# Patient Record
Sex: MALE | URBAN - METROPOLITAN AREA
[De-identification: names, ages, dates, MRNs, and addresses within clinical notes are randomized per-mention and may not be internally consistent; named-entity substitution may affect disease eponyms.]

---

## 2019-06-26 ENCOUNTER — APPOINTMENT (RX ONLY)
Dept: URBAN - METROPOLITAN AREA CLINIC 38 | Facility: CLINIC | Age: 59
Setting detail: DERMATOLOGY
End: 2019-06-26

## 2019-06-26 DIAGNOSIS — L65.9 NONSCARRING HAIR LOSS, UNSPECIFIED: ICD-10-CM

## 2019-06-26 DIAGNOSIS — L82.0 INFLAMED SEBORRHEIC KERATOSIS: ICD-10-CM

## 2019-06-26 PROCEDURE — ? LIQUID NITROGEN

## 2019-06-26 PROCEDURE — ? TREATMENT REGIMEN

## 2019-06-26 PROCEDURE — 99202 OFFICE O/P NEW SF 15 MIN: CPT | Mod: 25

## 2019-06-26 PROCEDURE — ? COUNSELING

## 2019-06-26 PROCEDURE — 17110 DESTRUCTION B9 LES UP TO 14: CPT

## 2019-06-26 ASSESSMENT — LOCATION SIMPLE DESCRIPTION DERM: LOCATION SIMPLE: SCALP

## 2019-06-26 ASSESSMENT — LOCATION DETAILED DESCRIPTION DERM
LOCATION DETAILED: LEFT SUPERIOR PARIETAL SCALP
LOCATION DETAILED: LEFT CENTRAL FRONTAL SCALP

## 2019-06-26 ASSESSMENT — LOCATION ZONE DERM: LOCATION ZONE: SCALP

## 2024-09-22 ENCOUNTER — HOSPITAL ENCOUNTER (INPATIENT)
Facility: MEDICAL CENTER | Age: 64
LOS: 8 days | End: 2024-09-30
Attending: RADIOLOGY | Admitting: INTERNAL MEDICINE
Payer: COMMERCIAL

## 2024-09-22 DIAGNOSIS — I50.20 HEART FAILURE WITH REDUCED EJECTION FRACTION (HCC): ICD-10-CM

## 2024-09-22 DIAGNOSIS — G89.18 ACUTE POST-OPERATIVE PAIN: ICD-10-CM

## 2024-09-22 DIAGNOSIS — Z95.1 S/P CABG (CORONARY ARTERY BYPASS GRAFT): ICD-10-CM

## 2024-09-22 PROBLEM — I21.4 NSTEMI (NON-ST ELEVATED MYOCARDIAL INFARCTION) (HCC): Status: RESOLVED | Noted: 2024-09-22 | Resolved: 2024-09-22

## 2024-09-22 PROBLEM — I21.4 NSTEMI (NON-ST ELEVATED MYOCARDIAL INFARCTION) (HCC): Status: ACTIVE | Noted: 2024-09-22

## 2024-09-22 PROBLEM — Z72.0 TOBACCO ABUSE: Status: ACTIVE | Noted: 2024-09-22

## 2024-09-22 LAB
ALBUMIN SERPL BCP-MCNC: 3.7 G/DL (ref 3.2–4.9)
ALBUMIN/GLOB SERPL: 1.3 G/DL
ALP SERPL-CCNC: 67 U/L (ref 30–99)
ALT SERPL-CCNC: 105 U/L (ref 2–50)
ANION GAP SERPL CALC-SCNC: 12 MMOL/L (ref 7–16)
APTT PPP: 31.6 SEC (ref 24.7–36)
AST SERPL-CCNC: 55 U/L (ref 12–45)
BASOPHILS # BLD AUTO: 0.7 % (ref 0–1.8)
BASOPHILS # BLD: 0.08 K/UL (ref 0–0.12)
BILIRUB SERPL-MCNC: 1 MG/DL (ref 0.1–1.5)
BUN SERPL-MCNC: 18 MG/DL (ref 8–22)
CALCIUM ALBUM COR SERPL-MCNC: 8.9 MG/DL (ref 8.5–10.5)
CALCIUM SERPL-MCNC: 8.7 MG/DL (ref 8.5–10.5)
CHLORIDE SERPL-SCNC: 107 MMOL/L (ref 96–112)
CO2 SERPL-SCNC: 21 MMOL/L (ref 20–33)
CREAT SERPL-MCNC: 1.02 MG/DL (ref 0.5–1.4)
EKG IMPRESSION: NORMAL
EOSINOPHIL # BLD AUTO: 0.24 K/UL (ref 0–0.51)
EOSINOPHIL NFR BLD: 2.2 % (ref 0–6.9)
ERYTHROCYTE [DISTWIDTH] IN BLOOD BY AUTOMATED COUNT: 42.9 FL (ref 35.9–50)
GFR SERPLBLD CREATININE-BSD FMLA CKD-EPI: 83 ML/MIN/1.73 M 2
GLOBULIN SER CALC-MCNC: 2.8 G/DL (ref 1.9–3.5)
GLUCOSE SERPL-MCNC: 169 MG/DL (ref 65–99)
HCT VFR BLD AUTO: 48.7 % (ref 42–52)
HGB BLD-MCNC: 16.5 G/DL (ref 14–18)
IMM GRANULOCYTES # BLD AUTO: 0.04 K/UL (ref 0–0.11)
IMM GRANULOCYTES NFR BLD AUTO: 0.4 % (ref 0–0.9)
INR PPP: 1.02 (ref 0.87–1.13)
LYMPHOCYTES # BLD AUTO: 1.71 K/UL (ref 1–4.8)
LYMPHOCYTES NFR BLD: 15.7 % (ref 22–41)
MCH RBC QN AUTO: 29.7 PG (ref 27–33)
MCHC RBC AUTO-ENTMCNC: 33.9 G/DL (ref 32.3–36.5)
MCV RBC AUTO: 87.6 FL (ref 81.4–97.8)
MONOCYTES # BLD AUTO: 0.64 K/UL (ref 0–0.85)
MONOCYTES NFR BLD AUTO: 5.9 % (ref 0–13.4)
NEUTROPHILS # BLD AUTO: 8.2 K/UL (ref 1.82–7.42)
NEUTROPHILS NFR BLD: 75.1 % (ref 44–72)
NRBC # BLD AUTO: 0 K/UL
NRBC BLD-RTO: 0 /100 WBC (ref 0–0.2)
NT-PROBNP SERPL IA-MCNC: 2950 PG/ML (ref 0–125)
PLATELET # BLD AUTO: 248 K/UL (ref 164–446)
PMV BLD AUTO: 9.9 FL (ref 9–12.9)
POTASSIUM SERPL-SCNC: 4 MMOL/L (ref 3.6–5.5)
PROT SERPL-MCNC: 6.5 G/DL (ref 6–8.2)
PROTHROMBIN TIME: 13.4 SEC (ref 12–14.6)
RBC # BLD AUTO: 5.56 M/UL (ref 4.7–6.1)
SODIUM SERPL-SCNC: 140 MMOL/L (ref 135–145)
UFH PPP CHRO-ACNC: 0.11 IU/ML
UFH PPP CHRO-ACNC: <0.1 IU/ML
WBC # BLD AUTO: 10.9 K/UL (ref 4.8–10.8)

## 2024-09-22 PROCEDURE — 36415 COLL VENOUS BLD VENIPUNCTURE: CPT

## 2024-09-22 PROCEDURE — 700111 HCHG RX REV CODE 636 W/ 250 OVERRIDE (IP): Performed by: STUDENT IN AN ORGANIZED HEALTH CARE EDUCATION/TRAINING PROGRAM

## 2024-09-22 PROCEDURE — 85520 HEPARIN ASSAY: CPT

## 2024-09-22 PROCEDURE — 85025 COMPLETE CBC W/AUTO DIFF WBC: CPT

## 2024-09-22 PROCEDURE — 99407 BEHAV CHNG SMOKING > 10 MIN: CPT | Performed by: STUDENT IN AN ORGANIZED HEALTH CARE EDUCATION/TRAINING PROGRAM

## 2024-09-22 PROCEDURE — A9270 NON-COVERED ITEM OR SERVICE: HCPCS | Performed by: STUDENT IN AN ORGANIZED HEALTH CARE EDUCATION/TRAINING PROGRAM

## 2024-09-22 PROCEDURE — 83880 ASSAY OF NATRIURETIC PEPTIDE: CPT

## 2024-09-22 PROCEDURE — 80053 COMPREHEN METABOLIC PANEL: CPT

## 2024-09-22 PROCEDURE — 700102 HCHG RX REV CODE 250 W/ 637 OVERRIDE(OP): Performed by: STUDENT IN AN ORGANIZED HEALTH CARE EDUCATION/TRAINING PROGRAM

## 2024-09-22 PROCEDURE — 93010 ELECTROCARDIOGRAM REPORT: CPT | Performed by: INTERNAL MEDICINE

## 2024-09-22 PROCEDURE — 770020 HCHG ROOM/CARE - TELE (206)

## 2024-09-22 PROCEDURE — 93005 ELECTROCARDIOGRAM TRACING: CPT | Performed by: STUDENT IN AN ORGANIZED HEALTH CARE EDUCATION/TRAINING PROGRAM

## 2024-09-22 PROCEDURE — 85610 PROTHROMBIN TIME: CPT

## 2024-09-22 PROCEDURE — 99291 CRITICAL CARE FIRST HOUR: CPT | Mod: 25 | Performed by: STUDENT IN AN ORGANIZED HEALTH CARE EDUCATION/TRAINING PROGRAM

## 2024-09-22 PROCEDURE — 85730 THROMBOPLASTIN TIME PARTIAL: CPT

## 2024-09-22 RX ORDER — HEPARIN SODIUM 5000 [USP'U]/100ML
0-30 INJECTION, SOLUTION INTRAVENOUS CONTINUOUS
Status: DISCONTINUED | OUTPATIENT
Start: 2024-09-22 | End: 2024-09-25

## 2024-09-22 RX ORDER — METOPROLOL SUCCINATE 25 MG/1
25 TABLET, EXTENDED RELEASE ORAL
Status: DISCONTINUED | OUTPATIENT
Start: 2024-09-22 | End: 2024-09-24

## 2024-09-22 RX ORDER — NITROGLYCERIN 0.4 MG/1
0.4 TABLET SUBLINGUAL
Status: DISCONTINUED | OUTPATIENT
Start: 2024-09-22 | End: 2024-09-30 | Stop reason: HOSPADM

## 2024-09-22 RX ORDER — HEPARIN SODIUM 1000 [USP'U]/ML
30 INJECTION, SOLUTION INTRAVENOUS; SUBCUTANEOUS PRN
Status: DISCONTINUED | OUTPATIENT
Start: 2024-09-22 | End: 2024-09-22

## 2024-09-22 RX ORDER — ASPIRIN 81 MG/1
81 TABLET ORAL DAILY
Status: DISCONTINUED | OUTPATIENT
Start: 2024-09-23 | End: 2024-09-25

## 2024-09-22 RX ORDER — HEPARIN SODIUM 1000 [USP'U]/ML
30 INJECTION, SOLUTION INTRAVENOUS; SUBCUTANEOUS PRN
Status: DISCONTINUED | OUTPATIENT
Start: 2024-09-22 | End: 2024-09-25

## 2024-09-22 RX ORDER — LOSARTAN POTASSIUM 25 MG/1
25 TABLET ORAL
Status: DISCONTINUED | OUTPATIENT
Start: 2024-09-22 | End: 2024-09-25

## 2024-09-22 RX ORDER — ATORVASTATIN CALCIUM 40 MG/1
40 TABLET, FILM COATED ORAL EVERY EVENING
Status: DISCONTINUED | OUTPATIENT
Start: 2024-09-22 | End: 2024-09-25

## 2024-09-22 RX ORDER — MORPHINE SULFATE 4 MG/ML
4 INJECTION INTRAVENOUS EVERY 4 HOURS PRN
Status: DISCONTINUED | OUTPATIENT
Start: 2024-09-22 | End: 2024-09-25

## 2024-09-22 RX ADMIN — METOPROLOL SUCCINATE 25 MG: 25 TABLET, EXTENDED RELEASE ORAL at 16:55

## 2024-09-22 RX ADMIN — HEPARIN SODIUM 1900 UNITS: 1000 INJECTION, SOLUTION INTRAVENOUS; SUBCUTANEOUS at 22:52

## 2024-09-22 RX ADMIN — LOSARTAN POTASSIUM 25 MG: 25 TABLET, FILM COATED ORAL at 16:55

## 2024-09-22 RX ADMIN — ATORVASTATIN CALCIUM 40 MG: 40 TABLET, FILM COATED ORAL at 16:55

## 2024-09-22 RX ADMIN — HEPARIN SODIUM 12 UNITS/KG/HR: 5000 INJECTION, SOLUTION INTRAVENOUS at 16:12

## 2024-09-22 ASSESSMENT — COGNITIVE AND FUNCTIONAL STATUS - GENERAL
SUGGESTED CMS G CODE MODIFIER DAILY ACTIVITY: CH
SUGGESTED CMS G CODE MODIFIER MOBILITY: CH
DAILY ACTIVITIY SCORE: 24
MOBILITY SCORE: 24

## 2024-09-22 ASSESSMENT — SOCIAL DETERMINANTS OF HEALTH (SDOH)
WITHIN THE LAST YEAR, HAVE TO BEEN RAPED OR FORCED TO HAVE ANY KIND OF SEXUAL ACTIVITY BY YOUR PARTNER OR EX-PARTNER?: NO
WITHIN THE LAST YEAR, HAVE YOU BEEN HUMILIATED OR EMOTIONALLY ABUSED IN OTHER WAYS BY YOUR PARTNER OR EX-PARTNER?: NO
WITHIN THE LAST YEAR, HAVE YOU BEEN KICKED, HIT, SLAPPED, OR OTHERWISE PHYSICALLY HURT BY YOUR PARTNER OR EX-PARTNER?: NO
WITHIN THE LAST YEAR, HAVE YOU BEEN AFRAID OF YOUR PARTNER OR EX-PARTNER?: NO

## 2024-09-22 ASSESSMENT — ENCOUNTER SYMPTOMS
NEUROLOGICAL NEGATIVE: 1
SHORTNESS OF BREATH: 1
GASTROINTESTINAL NEGATIVE: 1
EYES NEGATIVE: 1
MUSCULOSKELETAL NEGATIVE: 1
PSYCHIATRIC NEGATIVE: 1

## 2024-09-22 ASSESSMENT — PAIN DESCRIPTION - PAIN TYPE: TYPE: ACUTE PAIN

## 2024-09-22 ASSESSMENT — PATIENT HEALTH QUESTIONNAIRE - PHQ9
SUM OF ALL RESPONSES TO PHQ9 QUESTIONS 1 AND 2: 0
2. FEELING DOWN, DEPRESSED, IRRITABLE, OR HOPELESS: NOT AT ALL
1. LITTLE INTEREST OR PLEASURE IN DOING THINGS: NOT AT ALL
1. LITTLE INTEREST OR PLEASURE IN DOING THINGS: NOT AT ALL
2. FEELING DOWN, DEPRESSED, IRRITABLE, OR HOPELESS: NOT AT ALL
SUM OF ALL RESPONSES TO PHQ9 QUESTIONS 1 AND 2: 0

## 2024-09-22 NOTE — PROGRESS NOTES
[facility] DIRECT ADMISSION REPORT  Transferring facility: [facility]  Transferring physician: Dr Reyes    Chief complaint: CP and SOB  Pertinent history & patient course:     63-year-old male who lives in [facility] [facility] with long tobacco history who presented to that hospital with chest pain.  He was found on the an elevated troponin of 5900 and was transferred to [facility] for an NSTEMI.  He was placed on a weight-based heparin drip and underwent an urgent left heart catheterization that showed severe multivessel coronary artery disease.  Additionally had a CTA chest given concerning cough that showed bilateral PEs with some peribronchial cuffing.  However though talking to the hospitalist at [facility] he is not concern for active infection therefore antibiotics have been held at this time.  Dr. Sheffield a cardiothoracic surgery was consulted recommended transfer to our hospital for urgent CABG evaluation.  Otherwise he is hemodynamically stable.    Pertinent imaging & lab results: See above  Consultants called prior to transfer and pertinent input from consultants: Cardiothoracic surgery  Code Status: Full code full care per transferring provider, I personally verified with the transferring provider patient's code status and the transferring provider has confirmed this with the patient.  Reason for Transfer: Evaluation for CABG  Further work up or recommendations requested prior to transfer: none    Patient accepted for transfer: Yes  Accepting [facility] Facility: [facility] to notify the Triage Coordinator in the RTOC via Voalte or Phone ext. [phone] when patient arrives to the unit. The Triage Coordinator will assign the admitting provider.    Consultants to be called upon arrival: Cardiothoracic surgery  Admission status: Inpatient.   Floor requested: Telemetry  If ICU transfer, name of intensivist case discussed with and pertinent input from  critical care: Not applicable    The admitting provider is the point of contact for questions or concerns regarding patient's care.

## 2024-09-22 NOTE — PROGRESS NOTES
4 Eyes Skin Assessment Completed by Jace RN and MARICEL Padron.    Head WDL  Ears WDL  Nose WDL  Mouth WDL  Neck WDL  Breast/Chest WDL  Shoulder Blades WDL  Spine WDL  (R) Arm/Elbow/Hand Wrist Cath site  (L) Arm/Elbow/Hand WDL  Abdomen WDL  Groin WDL  Scrotum/Coccyx/Buttocks WDL  (R) Leg WDL  (L) Leg WDL  (R) Heel/Foot/Toe WDL  (L) Heel/Foot/Toe WDL          Devices In Places ECG, Tele Box, Blood Pressure Cuff, and Pulse Ox      Interventions In Place Pillows, Low Air Loss Mattress, Dri-Randal Pads, and Heels Loaded W/Pillows    Possible Skin Injury No    Pictures Uploaded Into Epic N/A  Wound Consult Placed N/A  RN Wound Prevention Protocol Ordered No

## 2024-09-22 NOTE — H&P
Hospital Medicine History & Physical Note    Date of Service  9/22/2024    Primary Care Physician  No primary care provider on file.    Consultants  Cardiothoracic surgery    Code Status  Full Code    Chief Complaint  NSTEMI    History of Presenting Illness  Gold Bustamante is a 63 y.o. male who presented 9/22/2024 with hypertension, diabetes, tobacco use who presented to outside hospital for shortness of breath.  He was initially seen at [facility] where he had workup with elevated D-dimer and troponin.  CTA chest showed no pulmonary embolism, but showed small bilateral pleural effusions and multifocal airspace disease more prominent in the right lower lobe.  Respiratory film assay was negative for adenovirus, COVID, coronavirus, influenza, human metapneumovirus, parainfluenza, RSV, rhinovirus, Bordetella, chlamydia pneumonia, mycoplasma pneumonia.  Patient was then transferred to [facility] for further care.     At [facility], found to have elevated troponin 5900 and . TTE showing mild concentric left ventricular hypertrophy.  Severe global hypokinesis with an EF of 25%.  No thrombus seen.  Grade 3 diastolic dysfunction with markedly elevated left ventricular filling pressures.  Lipid panel: Total cholesterol 205 triglycerides 144 HDL 35   A1c 5.6    Patient was started on heparin drip.  Patient underwent cardiac catheterization with findings of multivessel coronary artery disease.  Patient was then transferred to [facility] on heparin drip for cardiothoracic surgery consultation.    I discussed the plan of care with patient.    Review of Systems  Review of Systems   Constitutional:  Positive for malaise/fatigue.   HENT: Negative.     Eyes: Negative.    Respiratory:  Positive for shortness of breath.    Cardiovascular:  Positive for chest pain.   Gastrointestinal: Negative.    Genitourinary: Negative.    Musculoskeletal: Negative.    Skin: Negative.    Neurological: Negative.   "  Endo/Heme/Allergies: Negative.    Psychiatric/Behavioral: Negative.         Past Medical History   has no past medical history on file.    Surgical History   has no past surgical history on file.     Family History  family history is not on file.   Family history reviewed with patient. There is no family history that is pertinent to the chief complaint.     Social History       Allergies  No Known Allergies    Medications  None       Physical Exam  Pulse:  [76] 76  Resp:  [16] 16  BP: (146)/(97) 146/97  SpO2:  [92 %] 92 %  Blood Pressure: (!) 146/97       Pulse: 76   Respiration: 16   Pulse Oximetry: 92 %       Physical Exam  Constitutional:       Appearance: Normal appearance. He is normal weight.   HENT:      Head: Normocephalic.      Nose: Nose normal.      Mouth/Throat:      Mouth: Mucous membranes are moist.   Eyes:      Pupils: Pupils are equal, round, and reactive to light.   Cardiovascular:      Rate and Rhythm: Normal rate and regular rhythm.      Pulses: Normal pulses.   Pulmonary:      Effort: Pulmonary effort is normal.      Breath sounds: Normal breath sounds.   Abdominal:      General: Abdomen is flat. Bowel sounds are normal.      Palpations: Abdomen is soft.   Musculoskeletal:         General: Normal range of motion.      Cervical back: Neck supple.   Skin:     General: Skin is warm.   Neurological:      General: No focal deficit present.      Mental Status: He is alert and oriented to person, place, and time. Mental status is at baseline.   Psychiatric:         Mood and Affect: Mood normal.         Behavior: Behavior normal.         Thought Content: Thought content normal.         Judgment: Judgment normal.         Laboratory:          No results for input(s): \"ALTSGPT\", \"ASTSGOT\", \"ALKPHOSPHAT\", \"TBILIRUBIN\", \"DBILIRUBIN\", \"GAMMAGT\", \"AMYLASE\", \"LIPASE\", \"ALB\", \"PREALBUMIN\", \"GLUCOSE\" in the last 72 hours.      No results for input(s): \"NTPROBNP\" in the last 72 hours.      No results for " "input(s): \"TROPONINT\" in the last 72 hours.    Imaging:  No orders to display       EKG:  I have personally reviewed the images and compared with prior images.    Assessment/Plan:  Justification for Admission Status  I anticipate this patient will require at least two midnights for appropriate medical management, necessitating inpatient admission because patient has NSTEMI    Patient will need a Telemetry bed on MEDICAL service .  The need is secondary to NSTEMI.    * NSTEMI (non-ST elevated myocardial infarction) (HCC)- (present on admission)  Assessment & Plan  Cardiac catheterization at outside hospital showing findings of multivessel coronary artery disease  Started on heparin drip, continue heparin drip  I spoke with cardiothoracic surgery, will see in am  Started on aspirin and Lipitor at outside hospital, continue  A1c 5.6 and  at outside hospital    Tobacco abuse  Assessment & Plan  11 minutes spent on tobacco cessation.  Patient has been smoking half a pack of cigarettes for several decades.  Unsure of whether or not he has COPD.  He was counseled on benefits of tobacco cessation and its likely contribution to his current multivessel coronary artery disease.  He states that he will quit smoking.  He was offered nicotine replacement therapy, to which at this time he declines.    Heart failure with reduced ejection fraction (HCC)  Assessment & Plan  Found to have severe hypokinesis globally with an EF of 25% without any thrombus seen  Start losartan and metoprolol      Patient is critically ill.   The patient continues to have: NSTEMI with multivessel coronary artery disease  The vital organ system that is affected is the: Heart, cardiovascular system  If untreated there is a high chance of deterioration into: STEMI, arrhythmia  And eventually death.   The critical care that I am providing today is: Heparin drip  The critical that has been undertaken is medically complex.   There has been no overlap in " critical care time.   Critical Care Time not including procedures: 35      VTE prophylaxis: therapeutic anticoagulation with heparin drip

## 2024-09-22 NOTE — ASSESSMENT & PLAN NOTE
Found to have severe hypokinesis globally with an EF of 25% without any thrombus seen  Clinically appears euvolemic  GDMT as tolerated post op  If renal function remains stable

## 2024-09-22 NOTE — ASSESSMENT & PLAN NOTE
Cardiac catheterization at outside hospital showing findings of multivessel coronary artery disease  Cont on heparin drip   Cardiothoracic surgery following and planing for surgery on 9/25/2024  A1c 5.6 and  at outside hospital  Continue aspirin atorvastatin

## 2024-09-22 NOTE — CONSULTS
REFERRING PHYSICIAN: Jose Montague MD.     CONSULTING PHYSICIAN: Pierre Sheffield DO     CHIEF COMPLAINT: NSTEMI    HISTORY OF PRESENT ILLNESS: The patient is a 63 y.o. male with a past medical history significant for hypertension, diabetes, tobacco use who presented to outside hospital in [place] with chest pain, was found to have elevated troponin of 5900 and was transferred to [facility] for NSTEMI. He was started on medical management and underwent urgent left heart catheterization showing multivessel coronary artery disease. He also had CT chest demonstrating bilateral pulmonary effusions, mediastinal lymphadenopathy, multifocal airspace disease, severe peribronchial thickening, and atheromatous calcification of thoracic aorta. He was then transferred to [facility] for a higher level of care and cardiac surgery consultation.     The patient was seen and evaluated at bedside and endorses the above history. He currently reports no new chest pains. He does not have a strong support system here in [place]. He lives with a room mate, but his closet family is in [facility]. He is also insistent to go home to care of personal matters prior to surgery, despite my telling him he needs surgery.      PAST MEDICAL HISTORY:   Active Ambulatory Problems     Diagnosis Date Noted    No Active Ambulatory Problems     Resolved Ambulatory Problems     Diagnosis Date Noted    No Resolved Ambulatory Problems     No Additional Past Medical History       PAST SURGICAL HISTORY: No past surgical history on file.     ALLERGIES: No Known Allergies     CURRENT MEDICATIONS:   Current Facility-Administered Medications:     heparin infusion 25,000 units in 500 mL 0.45% NACL, 0-30 Units/kg/hr, Intravenous, Continuous, Jose Montague M.D., Last Rate: 15.1 mL/hr at 09/22/24 1612, 12 Units/kg/hr at 09/22/24 1612    [START ON 9/23/2024] aspirin EC tablet 81 mg, 81 mg, Oral, DAILY, Jose Montague M.D.     atorvastatin (Lipitor) tablet 40 mg, 40 mg, Oral, Q EVENING, Jose Montague M.D.    heparin injection 1,900 Units, 30 Units/kg, Intravenous, PRN, Jose Montague M.D.    losartan (Cozaar) tablet 25 mg, 25 mg, Oral, Q DAY, Jose Montague M.D.    metoprolol SR (Toprol XL) tablet 25 mg, 25 mg, Oral, Q DAY, Jose Montague M.D.    morphine 4 MG/ML injection 4 mg, 4 mg, Intravenous, Q4HRS PRN, Jose Montague M.D.    nitroglycerin (Nitrostat) tablet 0.4 mg, 0.4 mg, Sublingual, Q5 MIN PRN, Jose Montague M.D.    FAMILY HISTORY: No family history on file.     SOCIAL HISTORY:   Social History     Socioeconomic History    Marital status: Single     Spouse name: Not on file    Number of children: Not on file    Years of education: Not on file    Highest education level: Not on file   Occupational History    Not on file   Tobacco Use    Smoking status: Not on file    Smokeless tobacco: Not on file   Substance and Sexual Activity    Alcohol use: Not on file    Drug use: Not on file    Sexual activity: Not on file   Other Topics Concern    Not on file   Social History Narrative    Not on file     Social Determinants of Health     Financial Resource Strain: Low Risk  (9/21/2024)    Received from [facility]    Overall Financial Resource Strain (CARDIA)     Difficulty of Paying Living Expenses: Not hard at all   Food Insecurity: No Food Insecurity (9/21/2024)    Received from [facility]    Hunger Vital Sign     Worried About Running Out of Food in the Last Year: Never true     Ran Out of Food in the Last Year: Never true   Transportation Needs: No Transportation Needs (9/21/2024)    Received from [facility]    PRAPARE - Transportation     Lack of Transportation (Medical): No     Lack of Transportation (Non-Medical): No   Physical Activity: Sufficiently Active (9/21/2024)    Received from [facility]    Exercise Vital Sign     Days of Exercise per Week: 5 days     Minutes of Exercise per  Session: 60 min   Stress: No Stress Concern Present (9/21/2024)    Received from [facility]    Citizen of [place] of Occupational Health - Occupational Stress Questionnaire     Feeling of Stress : Not at all   Social Connections: Socially Isolated (9/21/2024)    Received from [facility]    Social Connection and Isolation Panel [NHANES]     Frequency of Communication with Friends and Family: Once a week     Frequency of Social Gatherings with Friends and Family: Once a week     Attends Tenriism Services: Never     Active Member of Clubs or Organizations: No     Attends Club or Organization Meetings: Never     Marital Status: Never    Intimate Partner Violence: Not At Risk (9/22/2024)    Humiliation, Afraid, Rape, and Kick questionnaire     Fear of Current or Ex-Partner: No     Emotionally Abused: No     Physically Abused: No     Sexually Abused: No   Housing Stability: Low Risk  (9/21/2024)    Received from [facility]    Housing Stability Vital Sign     Unable to Pay for Housing in the Last Year: No     Number of Times Moved in the Last Year: 1     Homeless in the Last Year: No       REVIEW OF SYSTEMS:  Review of Systems   Constitutional:  Negative for chills, diaphoresis, fever and weight loss.   HENT:  Negative for congestion, ear pain, hearing loss, nosebleeds, sore throat and tinnitus.    Eyes:  Negative for blurred vision, double vision, pain and discharge.   Respiratory:  Negative for cough, hemoptysis, sputum production, wheezing and stridor.    Cardiovascular:  Positive for chest pain. Negative for palpitations, orthopnea and leg swelling.   Gastrointestinal:  Negative for abdominal pain, constipation, heartburn, melena, nausea and vomiting.   Genitourinary:  Negative for dysuria, flank pain and hematuria.   Musculoskeletal:  Positive for joint pain. Negative for myalgias and neck pain.   Skin:  Negative for itching and rash.   Neurological:  Negative for dizziness, speech change,  "focal weakness, seizures, weakness and headaches.   Endo/Heme/Allergies:  Negative for environmental allergies and polydipsia. Does not bruise/bleed easily.   Psychiatric/Behavioral:  Negative for depression, hallucinations, substance abuse and suicidal ideas.          PHYSICAL EXAMINATION:    BP (!) 146/97   Pulse 76   Resp 16   Ht 1.651 m (5' 5\")   Wt 63.1 kg (139 lb 1.8 oz)   SpO2 92%   BMI 23.15 kg/m²    Physical Exam  Constitutional:       General: He is not in acute distress.  HENT:      Head: Normocephalic and atraumatic.      Nose: Nose normal.   Eyes:      Conjunctiva/sclera: Conjunctivae normal.      Pupils: Pupils are equal, round, and reactive to light.   Neck:      Vascular: No JVD.      Trachea: No tracheal deviation.   Cardiovascular:      Rate and Rhythm: Normal rate and regular rhythm.   Pulmonary:      Effort: Pulmonary effort is normal. No respiratory distress.      Breath sounds: Normal breath sounds. No stridor.   Abdominal:      General: Bowel sounds are normal. There is no distension.      Palpations: Abdomen is soft.      Tenderness: There is no abdominal tenderness.   Musculoskeletal:         General: No tenderness. Normal range of motion.      Cervical back: Normal range of motion and neck supple.   Skin:     General: Skin is warm and dry.   Neurological:      Mental Status: He is alert and oriented to person, place, and time.      Coordination: Coordination normal.      Gait: Gait is intact.   Psychiatric:         Mood and Affect: Mood and affect normal.         Cognition and Memory: Memory normal.             LABS REVIEWED:  Lab Results   Component Value Date/Time    SODIUM 140 09/22/2024 04:06 PM    POTASSIUM 4.0 09/22/2024 04:06 PM    CHLORIDE 107 09/22/2024 04:06 PM    CO2 21 09/22/2024 04:06 PM    GLUCOSE 169 (H) 09/22/2024 04:06 PM    BUN 18 09/22/2024 04:06 PM    CREATININE 1.02 09/22/2024 04:06 PM    BUNCREATRAT 18.2 09/22/2024 03:25 AM      Lab Results   Component Value " Date/Time    PROTHROMBTM 13.4 09/22/2024 04:06 PM    INR 1.02 09/22/2024 04:06 PM      Lab Results   Component Value Date/Time    WBC 10.9 (H) 09/22/2024 04:06 PM    RBC 5.56 09/22/2024 04:06 PM    HEMOGLOBIN 16.5 09/22/2024 04:06 PM    HEMATOCRIT 48.7 09/22/2024 04:06 PM    MCV 87.6 09/22/2024 04:06 PM    MCH 29.7 09/22/2024 04:06 PM    MCHC 33.9 09/22/2024 04:06 PM    MPV 9.9 09/22/2024 04:06 PM    NEUTSPOLYS 75.10 (H) 09/22/2024 04:06 PM    LYMPHOCYTES 15.70 (L) 09/22/2024 04:06 PM    MONOCYTES 5.90 09/22/2024 04:06 PM    EOSINOPHILS 2.20 09/22/2024 04:06 PM    BASOPHILS 0.70 09/22/2024 04:06 PM        IMAGING REVIEWED AND INTERPRETED:    ECHOCARDIOGRAM   9/22/24 [facility]- TTE  Conclusion   1. Mild concentric LVH. Severe global hypokinesis with an LVEF of 25%. There is no ventricular septal defect visualized. No left ventricle thrombus noted on this study. There is grade 3 (restrictive) diastolic dysfunction with markedly elevated LV filling pressures.   2. Left atrium is dilated at 46 ml/m2.   3. The Aortic valve is mildly sclerotic without stenosis. AV appears trileaflet. Mild to moderate aortic regurgitation. PHT is 373 ms.   4. Mild mitral regurgitation.   5. Aortic root is dilated at 3.9 cm.   6. The right ventricle is normal in size and function. Estimated RVSP = 24 mmHg (RAP = 15 mmHg).     CARDIAC CATHETERIZATION   9/22/24 [facility] LHC  LM is calcified and has diffuse long 80% stenosis.  LAD is calcified and has 80% stenosis in proximal-mid segment. Mid-distal LAD looks patent and looks a good target for LIMA. It gives 3 diagonal branches, which look patent.  Ramus looks patent without significant disease.  LCX has 70-80% focal stenosis in mid segment. It gives 2 OM branches, which look patent  RCA is  in mid segment. There are right to right and left to right collaterals filling the distal RCA. LVEDP was measured at 11 mmHg     CT SCAN CHEST   9/21/24 [facility]   Impression  1. No  evidence of pulmonary embolism.  2. Small bilateral pleural effusions.  3. Multifocal airspace disease, predominantly affecting the right lower lobe concerning for  inflammatory/infectious atypical pneumonitis.  4. Severe peribronchial thickening suggesting inflammatory/infectious bronchitis/bronchiolitis.  5. Mediastinal lymphadenopathy.  6. Mild atheromatous calcification of the thoracic aorta.  7. Other incidental findings as noted above.        IMPRESSION:  63 y.o. male with a past medical history significant for hypertension, diabetes, tobacco use, now with severe symptomatic multivessel coronary artery disease      PLAN:  I recommend CABG x3-4 with EVH. We do need to acquire the images from the OLF prior to the procedure. I discussed with patient the importance of the need for surgery, and the potential for harm and delay in care, should he go home to take care of his personal matters. I will schedule him for surgery, anticipating he will stay for said surgery.     The procedure, its risks, benefits, potential complications and alternative treatments were discussed with the patient in detail including the risks should he decide not to undergo my recommended treatment. All of his questions were answered to his satisfaction and he is willing to proceed with the operation. The risks include death, stroke, infection: to include a rare bacterial infection related to the use of the heart/lung machine, mary anne-operative myocardial infarction, dysrhythmias, diaphragmatic paralysis, chest wall paresthesia, tracheostomy, kidney or other organ failure, possible return to the operating room for bleeding, bleeding requiring transfusion with its attendant risks including AIDS or hepatitis, dehiscence of surgical incisions, respiratory complications including the need for prolonged ventilator support, Protamine or other drug reaction, peripheral neuropathy, loss of limb, and miscount of surgical items. The operative mortality  risk is approximately 4%. The STS mortality risk score for CABG is 3% and the morbidity and mortality risk score for CABG is 15%. The scores were discussed with patient.    Thank you for this very challenging consultation and participation in the patient’s care.  I will keep you apprised of all future developments.      The operation is scheduled for surgery this hospitalization       Sincerely,     Pierre Sheffield DO        I,  Pierre Sheffield DO performed a substantial portion of the service face-to-face with the same patient on the same date of service. I have reviewed and agree with the care plan and complexity of problems documented by me, Pierre Sheffield DO and/or Kranthi Rey PA-C. I was personally involved in the medical decision making, including the information below:  reviewing and interpreting the films, conducted elements of the history and physical exam, and provided the plan of care. All medical decision making was made by me.

## 2024-09-23 ENCOUNTER — HOSPITAL ENCOUNTER (OUTPATIENT)
Dept: RADIOLOGY | Facility: MEDICAL CENTER | Age: 64
End: 2024-09-23
Attending: HOSPITALIST

## 2024-09-23 ENCOUNTER — APPOINTMENT (OUTPATIENT)
Dept: RADIOLOGY | Facility: MEDICAL CENTER | Age: 64
End: 2024-09-23
Attending: NURSE PRACTITIONER
Payer: COMMERCIAL

## 2024-09-23 ENCOUNTER — HOSPITAL ENCOUNTER (OUTPATIENT)
Dept: RADIOLOGY | Facility: MEDICAL CENTER | Age: 64
End: 2024-09-23

## 2024-09-23 ENCOUNTER — APPOINTMENT (OUTPATIENT)
Dept: RADIOLOGY | Facility: MEDICAL CENTER | Age: 64
DRG: 235 | End: 2024-09-23
Attending: NURSE PRACTITIONER
Payer: COMMERCIAL

## 2024-09-23 LAB
UFH PPP CHRO-ACNC: 0.44 IU/ML
UFH PPP CHRO-ACNC: 0.57 IU/ML

## 2024-09-23 PROCEDURE — 700111 HCHG RX REV CODE 636 W/ 250 OVERRIDE (IP): Mod: JZ | Performed by: STUDENT IN AN ORGANIZED HEALTH CARE EDUCATION/TRAINING PROGRAM

## 2024-09-23 PROCEDURE — 93970 EXTREMITY STUDY: CPT

## 2024-09-23 PROCEDURE — 85520 HEPARIN ASSAY: CPT

## 2024-09-23 PROCEDURE — 93880 EXTRACRANIAL BILAT STUDY: CPT

## 2024-09-23 PROCEDURE — A9270 NON-COVERED ITEM OR SERVICE: HCPCS | Performed by: STUDENT IN AN ORGANIZED HEALTH CARE EDUCATION/TRAINING PROGRAM

## 2024-09-23 PROCEDURE — 770020 HCHG ROOM/CARE - TELE (206)

## 2024-09-23 PROCEDURE — 36415 COLL VENOUS BLD VENIPUNCTURE: CPT

## 2024-09-23 PROCEDURE — 93970 EXTREMITY STUDY: CPT | Mod: 26 | Performed by: INTERNAL MEDICINE

## 2024-09-23 PROCEDURE — 99233 SBSQ HOSP IP/OBS HIGH 50: CPT | Performed by: INTERNAL MEDICINE

## 2024-09-23 PROCEDURE — 99223 1ST HOSP IP/OBS HIGH 75: CPT | Mod: 57,FS | Performed by: THORACIC SURGERY (CARDIOTHORACIC VASCULAR SURGERY)

## 2024-09-23 PROCEDURE — 700102 HCHG RX REV CODE 250 W/ 637 OVERRIDE(OP): Performed by: STUDENT IN AN ORGANIZED HEALTH CARE EDUCATION/TRAINING PROGRAM

## 2024-09-23 PROCEDURE — 93880 EXTRACRANIAL BILAT STUDY: CPT | Mod: 26 | Performed by: INTERNAL MEDICINE

## 2024-09-23 RX ADMIN — ASPIRIN 81 MG: 81 TABLET, COATED ORAL at 05:01

## 2024-09-23 RX ADMIN — LOSARTAN POTASSIUM 25 MG: 25 TABLET, FILM COATED ORAL at 05:01

## 2024-09-23 RX ADMIN — METOPROLOL SUCCINATE 25 MG: 25 TABLET, EXTENDED RELEASE ORAL at 05:01

## 2024-09-23 RX ADMIN — HEPARIN SODIUM 16 UNITS/KG/HR: 5000 INJECTION, SOLUTION INTRAVENOUS at 19:47

## 2024-09-23 RX ADMIN — ATORVASTATIN CALCIUM 40 MG: 40 TABLET, FILM COATED ORAL at 16:49

## 2024-09-23 ASSESSMENT — ENCOUNTER SYMPTOMS
BLURRED VISION: 0
HEMOPTYSIS: 0
HALLUCINATIONS: 0
PSYCHIATRIC NEGATIVE: 1
WHEEZING: 0
MYALGIAS: 0
FLANK PAIN: 0
CHILLS: 0
SPUTUM PRODUCTION: 0
HEADACHES: 0
BRUISES/BLEEDS EASILY: 0
SHORTNESS OF BREATH: 1
DOUBLE VISION: 0
DEPRESSION: 0
SORE THROAT: 0
PALPITATIONS: 0
HEARTBURN: 0
SEIZURES: 0
NAUSEA: 0
WEAKNESS: 0
EYES NEGATIVE: 1
FOCAL WEAKNESS: 0
EYE DISCHARGE: 0
ABDOMINAL PAIN: 0
EYE PAIN: 0
COUGH: 0
ORTHOPNEA: 0
NECK PAIN: 0
DIZZINESS: 0
SPEECH CHANGE: 0
STRIDOR: 0
POLYDIPSIA: 0
DIAPHORESIS: 0
MUSCULOSKELETAL NEGATIVE: 1
VOMITING: 0
GASTROINTESTINAL NEGATIVE: 1
CONSTIPATION: 0
FEVER: 0
NEUROLOGICAL NEGATIVE: 1
WEIGHT LOSS: 0

## 2024-09-23 ASSESSMENT — PAIN DESCRIPTION - PAIN TYPE: TYPE: ACUTE PAIN

## 2024-09-23 ASSESSMENT — LIFESTYLE VARIABLES: SUBSTANCE_ABUSE: 0

## 2024-09-23 NOTE — CARE PLAN
The patient is Watcher - Medium risk of patient condition declining or worsening    Shift Goals  Clinical Goals: coping strategies, comfort, VSS  Patient Goals: Rest, comfort  Family Goals: tenzin    Progress made toward(s) clinical / shift goals:    Problem: Respiratory:  Goal: Respiratory status will improve  Outcome: Not Progressing  Note: Pt WOB is progressing. Pt has pleural effusions with crackles on assessment.      Problem: Knowledge Deficit - Standard  Goal: Patient and family/care givers will demonstrate understanding of plan of care, disease process/condition, diagnostic tests and medications  Outcome: Progressing     Problem: Communication  Goal: The ability to communicate needs accurately and effectively will improve  Outcome: Progressing     Problem: Safety  Goal: Will remain free from injury  Outcome: Progressing  Goal: Will remain free from falls  Outcome: Progressing     Problem: Pain Management  Goal: Pain level will decrease to patient's comfort goal  Outcome: Progressing     Problem: Psychosocial Needs:  Goal: Level of anxiety will decrease  Outcome: Progressing  Note: Discussed plan of care, and what to expect come morning. Pt seems anxious about open heart surgery and has several questions for the doctors in the AM.      Problem: Urinary Elimination:  Goal: Ability to reestablish a normal urinary elimination pattern will improve  Outcome: Progressing     Problem: Skin Integrity  Goal: Risk for impaired skin integrity will decrease  Outcome: Progressing     Problem: Knowledge Deficit  Goal: Knowledge of disease process/condition, treatment plan, diagnostic tests, and medications will improve  Outcome: Progressing  Goal: Knowledge of the prescribed therapeutic regimen will improve  Outcome: Progressing     Problem: Optimal Care for the Acute Coronary Syndrome Patient  Goal: Optimal Care for the Acute Coronary Syndrome Patient  Outcome: Progressing  Goal: Potential Interventions for the Acute  Coronary Syndrome Patient  Outcome: Progressing  Note: PT has cardiothoracic consulted for possible CABG workup     Problem: Respiratory  Goal: Patient will achieve/maintain optimum respiratory ventilation and gas exchange  Outcome: Progressing     Problem: Hemodynamics  Goal: Patient's hemodynamics, fluid balance and neurologic status will be stable or improve  Outcome: Progressing     Problem: Pain - Standard  Goal: Alleviation of pain or a reduction in pain to the patient’s comfort goal  Outcome: Progressing       Patient is not progressing towards the following goals:      Problem: Respiratory:  Goal: Respiratory status will improve  Outcome: Not Progressing  Note: Pt WOB is progressing. Pt has pleural effusions with crackles on assessment.

## 2024-09-23 NOTE — DISCHARGE PLANNING
Case Management Discharge Planning    Admission Date: 9/22/2024  GMLOS: 2.4  ALOS: 1    TRANSFER BACK PATIENT    Referring Facility: [place]   Reason for Transfer: Cardiothoracic Surgery     Signed Repatriation/Transfer Back Agreement saved in .    Once this patient has received the requested service or the patient no longer requires tertiary level of care from [facility] and is stable to transfer back to referring facility, we can facilitate a transfer back. Please contact the [facility] at [phone] to coordinate this transfer request.

## 2024-09-23 NOTE — PROGRESS NOTES
Monitor Summary   Rhythm: SB-NSR  Rate: 59-84  Ectopy: PAC, PVC trigem,  0.21/0.10/0.38         Routine  care and anticipatory guidance

## 2024-09-23 NOTE — PROGRESS NOTES
Hospital Medicine Daily Progress Note    Date of Service  9/23/2024    Chief Complaint  Glod Bustamante is a 63 y.o. male admitted 9/22/2024 with NSTEMI    Hospital Course  This is a 63 y.o. male who presented 9/22/2024 with hypertension, diabetes, tobacco use who presented to outside hospital for shortness of breath.  He was initially seen at [facility] where he had workup with elevated D-dimer and troponin.  CTA chest showed no pulmonary embolism, but showed small bilateral pleural effusions and multifocal airspace disease more prominent in the right lower lobe.  Respiratory film assay was negative for adenovirus, COVID, coronavirus, influenza, human metapneumovirus, parainfluenza, RSV, rhinovirus, Bordetella, chlamydia pneumonia, mycoplasma pneumonia.  Patient was then transferred to [facility] for further care.      At [facility], found to have elevated troponin 5900 and . TTE showing mild concentric left ventricular hypertrophy.  Severe global hypokinesis with an EF of 25%.  No thrombus seen.  Grade 3 diastolic dysfunction with markedly elevated left ventricular filling pressures.  Lipid panel: Total cholesterol 205 triglycerides 144 HDL 35   A1c 5.6  Patient was started on heparin drip.  Patient underwent cardiac catheterization with findings of multivessel coronary artery disease.  Patient was then transferred to [facility] on heparin drip for cardiothoracic surgery consultation.    Interval Problem Update  Patient seen and examined, afebrile resting in bed, n nauea or vomiting. Cardiothoracic surgery following an planing for CABG on 9/25/24  Cont on heparin drip close monitor on tele for decompensation     I have discussed this patient's plan of care and discharge plan at IDT rounds today with Case Management, Nursing, Nursing leadership, and other members of the IDT team.    Consultants/Specialty  cardiovascular surgeon    Code Status  Full Code    Disposition  The patient is not  medically cleared for discharge to home or a post-acute facility.      I have placed the appropriate orders for post-discharge needs.    Review of Systems  Review of Systems   Constitutional:  Positive for malaise/fatigue.   HENT: Negative.     Eyes: Negative.    Respiratory:  Positive for shortness of breath.    Cardiovascular:  Positive for chest pain.   Gastrointestinal: Negative.    Genitourinary: Negative.    Musculoskeletal: Negative.    Skin: Negative.    Neurological: Negative.    Endo/Heme/Allergies: Negative.    Psychiatric/Behavioral: Negative.          Physical Exam  Temp:  [36.5 °C (97.7 °F)-36.7 °C (98.1 °F)] 36.6 °C (97.9 °F)  Pulse:  [59-76] 59  Resp:  [16-17] 16  BP: (127-148)/() 135/72  SpO2:  [92 %-97 %] 97 %    Physical Exam  Constitutional:       Appearance: Normal appearance. He is normal weight.   HENT:      Head: Normocephalic.      Nose: Nose normal.      Mouth/Throat:      Mouth: Mucous membranes are moist.   Eyes:      Pupils: Pupils are equal, round, and reactive to light.   Cardiovascular:      Rate and Rhythm: Normal rate and regular rhythm.      Pulses: Normal pulses.   Pulmonary:      Effort: Pulmonary effort is normal.      Breath sounds: Normal breath sounds.   Abdominal:      General: Abdomen is flat. Bowel sounds are normal.      Palpations: Abdomen is soft.   Musculoskeletal:         General: Normal range of motion.      Cervical back: Neck supple.   Skin:     General: Skin is warm.   Neurological:      General: No focal deficit present.      Mental Status: He is alert and oriented to person, place, and time. Mental status is at baseline.   Psychiatric:         Mood and Affect: Mood normal.         Behavior: Behavior normal.         Thought Content: Thought content normal.         Judgment: Judgment normal.         Fluids    Intake/Output Summary (Last 24 hours) at 9/23/2024 1315  Last data filed at 9/23/2024 0540  Gross per 24 hour   Intake 524.85 ml   Output --   Net  524.85 ml        Laboratory  Recent Labs     09/21/24  1650 09/22/24  0325 09/22/24  1606   WBC 9.00 10.00 10.9*   RBC 5.02 5.46 5.56   HEMOGLOBIN 15.0 16.2 16.5   HEMATOCRIT 45.2 48.9 48.7   MCV 89.9 89.7 87.6   MCH 29.8 29.7 29.7   MCHC 33.1* 33.1* 33.9   RDW 13.7 13.9 42.9   PLATELETCT 239 251 248   MPV 8.3 8.7 9.9     Recent Labs     09/21/24  1608 09/22/24  0325 09/22/24  1606   SODIUM 139 140 140   POTASSIUM 4.1 4.3 4.0   CHLORIDE 109 113* 107   CO2 25.0 20.0 21   GLUCOSE 116 119 169*   BUN 16.0 20.0 18   CREATININE 0.90 1.10 1.02   CALCIUM 8.8 9.2 8.7     Recent Labs     09/22/24  1606   APTT 31.6   INR 1.02               Imaging  US-CAROTID DOPPLER BILAT   Final Result      US-VEIN MAPPING LOWER EXTREMITY BILAT   Final Result           Assessment/Plan  * NSTEMI (non-ST elevated myocardial infarction) (HCC)- (present on admission)  Assessment & Plan  Cardiac catheterization at outside hospital showing findings of multivessel coronary artery disease  Cont on heparin drip   Cardiothoracic surgery following and planing for surgery on W  Started on aspirin and Lipitor at outside hospital, continue  A1c 5.6 and  at outside hospital    Tobacco abuse  Assessment & Plan  11 minutes spent on tobacco cessation.  Patient has been smoking half a pack of cigarettes for several decades.  Unsure of whether or not he has COPD.  He was counseled on benefits of tobacco cessation and its likely contribution to his current multivessel coronary artery disease.  He states that he will quit smoking.  He was offered nicotine replacement therapy, to which at this time he declines.    Heart failure with reduced ejection fraction (HCC)  Assessment & Plan  Found to have severe hypokinesis globally with an EF of 25% without any thrombus seen  Start losartan and metoprolol         VTE prophylaxis: Heparin drip    Greater than 51 minutes spent prepping to see patient (e.g. review of tests) obtaining and/or reviewing separately  obtained history. Performing a medically appropriate examination and/ evaluation.  Counseling and educating the patient/family/caregiver.  Ordering medications, tests, or procedures.  Referring and communicating with other health care professionals.  Documenting clinical information in EPIC.  Independently interpreting results and communicating results to patient/family/caregiver.  Care coordination.      I have performed a physical exam and reviewed and updated ROS and Plan today (9/23/2024). In review of yesterday's note (9/22/2024), there are no changes except as documented above.

## 2024-09-23 NOTE — ED NOTES
Med Rec complete per patient   Allergies reviewed  Antibiotics in the past 30 days:no  Anticoagulant in past 14 days:no  Pharmacy patient utilizes:[facility] in [place]    Pt states he does not take any RX medications

## 2024-09-23 NOTE — PROGRESS NOTES
Report received from Day Shift RN, assumed care of pt. Pt A&Ox4. Plan of care discussed with pt, labs and chart reviewed. All needs met at this time. Tele box on. Skin assessment completed. IV infusions traced to the patient and reviewed Heparin in the MAR. Call light within reach, bed locked and in lowest position. All fall precautions and hourly rounding in place. Care is ongoing.

## 2024-09-24 ENCOUNTER — APPOINTMENT (OUTPATIENT)
Dept: RADIOLOGY | Facility: MEDICAL CENTER | Age: 64
End: 2024-09-24
Attending: NURSE PRACTITIONER
Payer: COMMERCIAL

## 2024-09-24 ENCOUNTER — APPOINTMENT (OUTPATIENT)
Dept: RADIOLOGY | Facility: MEDICAL CENTER | Age: 64
DRG: 235 | End: 2024-09-24
Attending: NURSE PRACTITIONER
Payer: COMMERCIAL

## 2024-09-24 LAB
ABO + RH BLD: NORMAL
ABO GROUP BLD: NORMAL
ALBUMIN SERPL BCP-MCNC: 3.8 G/DL (ref 3.2–4.9)
ALBUMIN/GLOB SERPL: 1.2 G/DL
ALP SERPL-CCNC: 69 U/L (ref 30–99)
ALT SERPL-CCNC: 56 U/L (ref 2–50)
ANION GAP SERPL CALC-SCNC: 17 MMOL/L (ref 7–16)
ANION GAP SERPL CALC-SCNC: 19 MMOL/L (ref 7–16)
APPEARANCE UR: CLEAR
APTT PPP: 61 SEC (ref 24.7–36)
AST SERPL-CCNC: 36 U/L (ref 12–45)
BASOPHILS # BLD AUTO: 0.9 % (ref 0–1.8)
BASOPHILS # BLD: 0.07 K/UL (ref 0–0.12)
BILIRUB SERPL-MCNC: 0.6 MG/DL (ref 0.1–1.5)
BILIRUB UR QL STRIP.AUTO: NEGATIVE
BLD GP AB SCN SERPL QL: NORMAL
BUN SERPL-MCNC: 16 MG/DL (ref 8–22)
BUN SERPL-MCNC: 22 MG/DL (ref 8–22)
CALCIUM ALBUM COR SERPL-MCNC: 9.3 MG/DL (ref 8.5–10.5)
CALCIUM SERPL-MCNC: 8.8 MG/DL (ref 8.5–10.5)
CALCIUM SERPL-MCNC: 9.1 MG/DL (ref 8.5–10.5)
CHLORIDE SERPL-SCNC: 101 MMOL/L (ref 96–112)
CHLORIDE SERPL-SCNC: 104 MMOL/L (ref 96–112)
CO2 SERPL-SCNC: 15 MMOL/L (ref 20–33)
CO2 SERPL-SCNC: 19 MMOL/L (ref 20–33)
COLOR UR: YELLOW
CREAT SERPL-MCNC: 0.92 MG/DL (ref 0.5–1.4)
CREAT SERPL-MCNC: 1.19 MG/DL (ref 0.5–1.4)
EOSINOPHIL # BLD AUTO: 0.41 K/UL (ref 0–0.51)
EOSINOPHIL NFR BLD: 5.1 % (ref 0–6.9)
ERYTHROCYTE [DISTWIDTH] IN BLOOD BY AUTOMATED COUNT: 42.7 FL (ref 35.9–50)
ERYTHROCYTE [DISTWIDTH] IN BLOOD BY AUTOMATED COUNT: 42.9 FL (ref 35.9–50)
EST. AVERAGE GLUCOSE BLD GHB EST-MCNC: 126 MG/DL
GFR SERPLBLD CREATININE-BSD FMLA CKD-EPI: 69 ML/MIN/1.73 M 2
GFR SERPLBLD CREATININE-BSD FMLA CKD-EPI: 94 ML/MIN/1.73 M 2
GLOBULIN SER CALC-MCNC: 3.2 G/DL (ref 1.9–3.5)
GLUCOSE SERPL-MCNC: 127 MG/DL (ref 65–99)
GLUCOSE SERPL-MCNC: 94 MG/DL (ref 65–99)
GLUCOSE UR STRIP.AUTO-MCNC: NEGATIVE MG/DL
HBA1C MFR BLD: 6 % (ref 4–5.6)
HCT VFR BLD AUTO: 47.7 % (ref 42–52)
HCT VFR BLD AUTO: 49.3 % (ref 42–52)
HGB BLD-MCNC: 15.7 G/DL (ref 14–18)
HGB BLD-MCNC: 16.8 G/DL (ref 14–18)
IMM GRANULOCYTES # BLD AUTO: 0.02 K/UL (ref 0–0.11)
IMM GRANULOCYTES NFR BLD AUTO: 0.2 % (ref 0–0.9)
INR PPP: 1.02 (ref 0.87–1.13)
KETONES UR STRIP.AUTO-MCNC: NEGATIVE MG/DL
LEUKOCYTE ESTERASE UR QL STRIP.AUTO: NEGATIVE
LYMPHOCYTES # BLD AUTO: 2.08 K/UL (ref 1–4.8)
LYMPHOCYTES NFR BLD: 25.8 % (ref 22–41)
MCH RBC QN AUTO: 29.1 PG (ref 27–33)
MCH RBC QN AUTO: 30.2 PG (ref 27–33)
MCHC RBC AUTO-ENTMCNC: 32.9 G/DL (ref 32.3–36.5)
MCHC RBC AUTO-ENTMCNC: 34.1 G/DL (ref 32.3–36.5)
MCV RBC AUTO: 88.3 FL (ref 81.4–97.8)
MCV RBC AUTO: 88.5 FL (ref 81.4–97.8)
MICRO URNS: NORMAL
MONOCYTES # BLD AUTO: 0.58 K/UL (ref 0–0.85)
MONOCYTES NFR BLD AUTO: 7.2 % (ref 0–13.4)
NEUTROPHILS # BLD AUTO: 4.9 K/UL (ref 1.82–7.42)
NEUTROPHILS NFR BLD: 60.8 % (ref 44–72)
NITRITE UR QL STRIP.AUTO: NEGATIVE
NRBC # BLD AUTO: 0 K/UL
NRBC BLD-RTO: 0 /100 WBC (ref 0–0.2)
PH UR STRIP.AUTO: 6.5 [PH] (ref 5–8)
PLATELET # BLD AUTO: 227 K/UL (ref 164–446)
PLATELET # BLD AUTO: 255 K/UL (ref 164–446)
PMV BLD AUTO: 10.2 FL (ref 9–12.9)
PMV BLD AUTO: 9.9 FL (ref 9–12.9)
POTASSIUM SERPL-SCNC: 4 MMOL/L (ref 3.6–5.5)
POTASSIUM SERPL-SCNC: 4.6 MMOL/L (ref 3.6–5.5)
PROT SERPL-MCNC: 7 G/DL (ref 6–8.2)
PROT UR QL STRIP: NEGATIVE MG/DL
PROTHROMBIN TIME: 13.5 SEC (ref 12–14.6)
RBC # BLD AUTO: 5.4 M/UL (ref 4.7–6.1)
RBC # BLD AUTO: 5.57 M/UL (ref 4.7–6.1)
RBC UR QL AUTO: NEGATIVE
RH BLD: NORMAL
SODIUM SERPL-SCNC: 137 MMOL/L (ref 135–145)
SODIUM SERPL-SCNC: 138 MMOL/L (ref 135–145)
SP GR UR STRIP.AUTO: 1.01
UFH PPP CHRO-ACNC: 0.52 IU/ML
UROBILINOGEN UR STRIP.AUTO-MCNC: 1 MG/DL
WBC # BLD AUTO: 10.7 K/UL (ref 4.8–10.8)
WBC # BLD AUTO: 8.1 K/UL (ref 4.8–10.8)

## 2024-09-24 PROCEDURE — 86900 BLOOD TYPING SEROLOGIC ABO: CPT | Mod: 91

## 2024-09-24 PROCEDURE — 80053 COMPREHEN METABOLIC PANEL: CPT

## 2024-09-24 PROCEDURE — 85027 COMPLETE CBC AUTOMATED: CPT

## 2024-09-24 PROCEDURE — 700102 HCHG RX REV CODE 250 W/ 637 OVERRIDE(OP): Performed by: STUDENT IN AN ORGANIZED HEALTH CARE EDUCATION/TRAINING PROGRAM

## 2024-09-24 PROCEDURE — 83036 HEMOGLOBIN GLYCOSYLATED A1C: CPT

## 2024-09-24 PROCEDURE — 86850 RBC ANTIBODY SCREEN: CPT

## 2024-09-24 PROCEDURE — 85025 COMPLETE CBC W/AUTO DIFF WBC: CPT

## 2024-09-24 PROCEDURE — 700111 HCHG RX REV CODE 636 W/ 250 OVERRIDE (IP): Mod: JZ | Performed by: STUDENT IN AN ORGANIZED HEALTH CARE EDUCATION/TRAINING PROGRAM

## 2024-09-24 PROCEDURE — 93005 ELECTROCARDIOGRAM TRACING: CPT | Performed by: NURSE PRACTITIONER

## 2024-09-24 PROCEDURE — 71046 X-RAY EXAM CHEST 2 VIEWS: CPT

## 2024-09-24 PROCEDURE — 80048 BASIC METABOLIC PNL TOTAL CA: CPT

## 2024-09-24 PROCEDURE — 81003 URINALYSIS AUTO W/O SCOPE: CPT

## 2024-09-24 PROCEDURE — 85610 PROTHROMBIN TIME: CPT

## 2024-09-24 PROCEDURE — 770022 HCHG ROOM/CARE - ICU (200)

## 2024-09-24 PROCEDURE — A9270 NON-COVERED ITEM OR SERVICE: HCPCS | Performed by: STUDENT IN AN ORGANIZED HEALTH CARE EDUCATION/TRAINING PROGRAM

## 2024-09-24 PROCEDURE — 85520 HEPARIN ASSAY: CPT

## 2024-09-24 PROCEDURE — 85730 THROMBOPLASTIN TIME PARTIAL: CPT

## 2024-09-24 PROCEDURE — 86901 BLOOD TYPING SEROLOGIC RH(D): CPT | Mod: 91

## 2024-09-24 PROCEDURE — 99233 SBSQ HOSP IP/OBS HIGH 50: CPT | Performed by: HOSPITALIST

## 2024-09-24 RX ORDER — NOREPINEPHRINE BITARTRATE 0.03 MG/ML
0-1 INJECTION, SOLUTION INTRAVENOUS CONTINUOUS
Status: DISCONTINUED | OUTPATIENT
Start: 2024-09-25 | End: 2024-09-25

## 2024-09-24 RX ORDER — METOPROLOL TARTRATE 25 MG/1
12.5 TABLET, FILM COATED ORAL ONCE
Status: COMPLETED | OUTPATIENT
Start: 2024-09-25 | End: 2024-09-25

## 2024-09-24 RX ORDER — DEXMEDETOMIDINE HYDROCHLORIDE 4 UG/ML
0-1.5 INJECTION, SOLUTION INTRAVENOUS CONTINUOUS
Status: DISCONTINUED | OUTPATIENT
Start: 2024-09-25 | End: 2024-09-25

## 2024-09-24 RX ORDER — ALPRAZOLAM 0.25 MG/1
0.25 TABLET ORAL EVERY 6 HOURS PRN
Status: DISCONTINUED | OUTPATIENT
Start: 2024-09-25 | End: 2024-09-25 | Stop reason: HOSPADM

## 2024-09-24 RX ORDER — EPINEPHRINE HCL IN 0.9 % NACL 4MG/250ML
0-.5 PLASTIC BAG, INJECTION (ML) INTRAVENOUS CONTINUOUS
Status: DISCONTINUED | OUTPATIENT
Start: 2024-09-25 | End: 2024-09-25

## 2024-09-24 RX ORDER — ACETAMINOPHEN 500 MG
1000 TABLET ORAL ONCE
Status: COMPLETED | OUTPATIENT
Start: 2024-09-25 | End: 2024-09-25

## 2024-09-24 RX ADMIN — METOPROLOL SUCCINATE 25 MG: 25 TABLET, EXTENDED RELEASE ORAL at 04:58

## 2024-09-24 RX ADMIN — ASPIRIN 81 MG: 81 TABLET, COATED ORAL at 04:57

## 2024-09-24 RX ADMIN — LOSARTAN POTASSIUM 25 MG: 25 TABLET, FILM COATED ORAL at 04:57

## 2024-09-24 RX ADMIN — HEPARIN SODIUM 16 UNITS/KG/HR: 5000 INJECTION, SOLUTION INTRAVENOUS at 20:56

## 2024-09-24 RX ADMIN — ATORVASTATIN CALCIUM 40 MG: 40 TABLET, FILM COATED ORAL at 18:46

## 2024-09-24 ASSESSMENT — ENCOUNTER SYMPTOMS
SHORTNESS OF BREATH: 1
ABDOMINAL PAIN: 0
FEVER: 0
NAUSEA: 0
VOMITING: 0

## 2024-09-24 ASSESSMENT — LIFESTYLE VARIABLES
HAVE PEOPLE ANNOYED YOU BY CRITICIZING YOUR DRINKING: NO
ALCOHOL_USE: YES
TOTAL SCORE: 0
DOES PATIENT WANT TO STOP DRINKING: NO
EVER FELT BAD OR GUILTY ABOUT YOUR DRINKING: NO
CONSUMPTION TOTAL: NEGATIVE
HOW MANY TIMES IN THE PAST YEAR HAVE YOU HAD 5 OR MORE DRINKS IN A DAY: 0
ON A TYPICAL DAY WHEN YOU DRINK ALCOHOL HOW MANY DRINKS DO YOU HAVE: 2
HAVE YOU EVER FELT YOU SHOULD CUT DOWN ON YOUR DRINKING: NO
AVERAGE NUMBER OF DAYS PER WEEK YOU HAVE A DRINK CONTAINING ALCOHOL: 2
EVER HAD A DRINK FIRST THING IN THE MORNING TO STEADY YOUR NERVES TO GET RID OF A HANGOVER: NO

## 2024-09-24 ASSESSMENT — FIBROSIS 4 INDEX: FIB4 SCORE: 1.28

## 2024-09-24 ASSESSMENT — PAIN DESCRIPTION - PAIN TYPE
TYPE: ACUTE PAIN
TYPE: ACUTE PAIN;SURGICAL PAIN

## 2024-09-24 ASSESSMENT — PATIENT HEALTH QUESTIONNAIRE - PHQ9
1. LITTLE INTEREST OR PLEASURE IN DOING THINGS: NOT AT ALL
2. FEELING DOWN, DEPRESSED, IRRITABLE, OR HOPELESS: NOT AT ALL
SUM OF ALL RESPONSES TO PHQ9 QUESTIONS 1 AND 2: 0

## 2024-09-24 NOTE — PROGRESS NOTES
Hospital Medicine Daily Progress Note    Date of Service  9/24/2024    Chief Complaint  Gold Bustamante is a 63 y.o. male admitted 9/22/2024 with NSTEMI    Hospital Course  This is a 63 y.o. male who presented 9/22/2024 with hypertension, diabetes, tobacco use who presented to outside hospital for shortness of breath.  He was initially seen at [facility] where he had workup with elevated D-dimer and troponin.  CTA chest showed no pulmonary embolism, but showed small bilateral pleural effusions and multifocal airspace disease more prominent in the right lower lobe.  Respiratory film assay was negative for adenovirus, COVID, coronavirus, influenza, human metapneumovirus, parainfluenza, RSV, rhinovirus, Bordetella, chlamydia pneumonia, mycoplasma pneumonia.  Patient was then transferred to [facility] for further care.      At [facility], found to have elevated troponin 5900 and . TTE showing mild concentric left ventricular hypertrophy.  Severe global hypokinesis with an EF of 25%.  No thrombus seen.  Grade 3 diastolic dysfunction with markedly elevated left ventricular filling pressures.  Lipid panel: Total cholesterol 205 triglycerides 144 HDL 35   A1c 5.6  Patient was started on heparin drip.  Patient underwent cardiac catheterization with findings of multivessel coronary artery disease.  Patient was then transferred to [facility] on heparin drip for cardiothoracic surgery consultation.    Interval Problem Update    Patient is afebrile on room air  He denies chest pain  On heparin drip  Scheduled for CABG tomorrow  Reviewed plan of care with patient and answered his questions  I reviewed chemistry panel BUN 22 creatinine 1.19  Reviewed CBC normal  I reviewed carotid duplex with mild less than 50% stenosis      I have discussed this patient's plan of care and discharge plan at IDT rounds today with Case Management, Nursing, Nursing leadership, and other members of the IDT  team.    Consultants/Specialty  cardiovascular surgeon    Code Status  Full Code    Disposition  Medically Cleared  I have placed the appropriate orders for post-discharge needs.    Review of Systems  Review of Systems   Constitutional:  Negative for fever.   Respiratory:  Positive for shortness of breath.    Cardiovascular:  Negative for chest pain.   Gastrointestinal:  Negative for abdominal pain, nausea and vomiting.        Physical Exam  Temp:  [36.4 °C (97.5 °F)-36.8 °C (98.2 °F)] 36.5 °C (97.7 °F)  Pulse:  [69-77] 69  Resp:  [17-18] 18  BP: (126-134)/(72-90) 134/85  SpO2:  [95 %-96 %] 95 %    Physical Exam  Vitals and nursing note reviewed.   Constitutional:       Appearance: He is well-developed. He is not diaphoretic.   HENT:      Head: Normocephalic and atraumatic.      Mouth/Throat:      Mouth: Oropharynx is clear and moist.      Pharynx: No oropharyngeal exudate.   Eyes:      General:         Right eye: No discharge.         Left eye: No discharge.   Neck:      Vascular: No JVD.      Trachea: No tracheal deviation.   Cardiovascular:      Rate and Rhythm: Normal rate and regular rhythm.      Heart sounds: No murmur heard.     No friction rub. No gallop.   Pulmonary:      Effort: Pulmonary effort is normal. No respiratory distress.      Breath sounds: Normal breath sounds. No stridor. No wheezing.   Chest:      Chest wall: No tenderness.   Abdominal:      General: Bowel sounds are normal. There is no distension.      Palpations: Abdomen is soft.      Tenderness: There is no abdominal tenderness. There is no rebound.   Musculoskeletal:         General: No tenderness or edema.      Cervical back: Neck supple.   Skin:     General: Skin is warm and dry.      Nails: There is no clubbing.   Neurological:      Mental Status: He is alert and oriented to person, place, and time.      Cranial Nerves: No cranial nerve deficit.      Motor: No abnormal muscle tone.   Psychiatric:         Mood and Affect: Mood and affect  normal.         Behavior: Behavior normal.         Fluids    Intake/Output Summary (Last 24 hours) at 9/24/2024 1259  Last data filed at 9/24/2024 0808  Gross per 24 hour   Intake 640 ml   Output --   Net 640 ml        Laboratory  Recent Labs     09/22/24  0325 09/22/24  1606 09/24/24  0352   WBC 10.00 10.9* 10.7   RBC 5.46 5.56 5.40   HEMOGLOBIN 16.2 16.5 15.7   HEMATOCRIT 48.9 48.7 47.7   MCV 89.7 87.6 88.3   MCH 29.7 29.7 29.1   MCHC 33.1* 33.9 32.9   RDW 13.9 42.9 42.7   PLATELETCT 251 248 255   MPV 8.7 9.9 10.2     Recent Labs     09/22/24  0325 09/22/24  1606 09/24/24  0352   SODIUM 140 140 138   POTASSIUM 4.3 4.0 4.6   CHLORIDE 113* 107 104   CO2 20.0 21 15*   GLUCOSE 119 169* 94   BUN 20.0 18 22   CREATININE 1.10 1.02 1.19   CALCIUM 9.2 8.7 8.8     Recent Labs     09/22/24  1606   APTT 31.6   INR 1.02               Imaging  US-CAROTID DOPPLER BILAT   Final Result      US-VEIN MAPPING LOWER EXTREMITY BILAT   Final Result           Assessment/Plan  * NSTEMI (non-ST elevated myocardial infarction) (HCC)- (present on admission)  Assessment & Plan  Cardiac catheterization at outside hospital showing findings of multivessel coronary artery disease  Cont on heparin drip   Cardiothoracic surgery following and planing for surgery on 9/25/2024  A1c 5.6 and  at outside hospital  Continue aspirin atorvastatin    Tobacco abuse  Assessment & Plan  Patient counseled on importance of smoking cessation    Heart failure with reduced ejection fraction (HCC)  Assessment & Plan  Found to have severe hypokinesis globally with an EF of 25% without any thrombus seen  Clinically appears euvolemic  GDMT as tolerated post op  If renal function remains stable         VTE prophylaxis: Heparin drip          I have performed a physical exam and reviewed and updated ROS and Plan today (9/24/2024). In review of yesterday's note (9/23/2024), there are no changes except as documented above.

## 2024-09-24 NOTE — DISCHARGE PLANNING
Case Management Discharge Planning    Admission Date: 9/22/2024  GMLOS: 2.4  ALOS: 2    6-Clicks ADL Score: 24  6-Clicks Mobility Score: 24      Anticipated Discharge Dispo: Discharge Disposition: Discharged to home/self care (01)    DME Needed: No    Action(s) Taken: Updated Provider/Nurse on Discharge Plan and DC Assessment Complete (See below)    Escalations Completed: None    Medically Clear: No    Next Steps: Pt does not have PCP and declined offer to one assigned while Inpt. Sister at bedside and has questions about plans for surgery.     Barriers to Discharge: Medical clearance    Is the patient up for discharge tomorrow: No

## 2024-09-24 NOTE — PROGRESS NOTES
Problem: Pre Op  Goal: Optimal preparation for CABG/Heart Valve surgery    Intervention: Pre Op education to patient & wife.    I briefly reviewed and discussed anatomy and physiology of cardiac surgery for CABG X 3-4 with patient and family. Visiting hours, phone numbers, morning of surgery visiting, and location for physician updates post op were reviewed.    Post op activities including the use of incentive spirometry with return demonstration, diet, early ambulation, including walking 4 times a day, up in the recliner 3 times a day for meals, cough and deep breathing with heart pillow, incentive spirometer use 10 times and hour, showering on POD # 3, pain control, sternal precautions & move within the tube, LARRY hose, importance of daily weights, and expected length of stay. Also provided information on Cardiac Rehab, start date, length, location, general program information, and how to schedule an appointment. Patient and family state full understanding of all information given.    Intervention: Baseline assessment documented to include IS volume, social/home discharge situation.  Baseline IS: 2000    Intervention: NPO at midnight except cardiac medications cleared by MD, and the PowerAde drink. (No ASA, coumadin or Plavix)  Instructed patient that he will be allowed nothing to eat or drink after midnight the night prior to scheduled surgery date EXCEPT the PowerAde/Gatorade drink.    Intervention: Shower with chlorhexidine x 2  Instructed patient that nursing staff will assist with washing entire body with chlorhexidine wipes prior to bedtime the night before surgery and again in the morning of.    Chart was reviewed for blood thinners and ACE/ARB therapy that may need stop prior to surgery.    Chart was reviewed for A1C >8.5 or dysglycemia that may need referral for optimization.    Patient is considering going to [facility] with one of his sisters to convalesce.  We discussed at length the complicated  logistics of leaving immediately to recover 10 hours away and the alternative of staying here at least a few weeks prior to leaving to go stay with his sister.  They will discuss and I will [place] back to them after surgery perhaps on Friday.    Education time was 1 hour.

## 2024-09-24 NOTE — CARE PLAN
The patient is Watcher - Medium risk of patient condition declining or worsening    Shift Goals  Clinical Goals: heparin gtt, trend vitals, telemetry  Patient Goals: stay informed  Family Goals: unable to assess    Progress made toward(s) clinical / shift goals:      Problem: Knowledge Deficit - Standard  Goal: Patient and family/care givers will demonstrate understanding of plan of care, disease process/condition, diagnostic tests and medications  Outcome: Progressing    Patient educated of disease process and condition. Treatment team has included patient in plan of care. All medications indications and side effects are explained. Patient is encouraged to ask questions. Patient verbalizes understanding. Education provided about gravity of condition and need to stay inpatient for procedure.      Problem: Hemodynamics  Goal: Patient's hemodynamics, fluid balance and neurologic status will be stable or improve  Outcome: Progressing    Patient vitals assessed Q4 and prn. Vital signs trended. Heparin drip maintained with no signs of bleeding. Patient assessed for signs of decreased cardiac output or ACS. Neuro status assessed and intact.        Patient is not progressing towards the following goals: N/A

## 2024-09-24 NOTE — PROGRESS NOTES
Monitor Summary:     Rhythm: SB-SR w BBB  Rate: 55-77  Ectopy: (R) PVC (R) trigeminy  Measurements: 0.20/0.11/0.43              12 Hour Chart Check

## 2024-09-24 NOTE — DISCHARGE PLANNING
In the case of an emergency, pt's legal NOK is sister Emma Trans     RNCM met with pt at bedside and obtained the information used in this assessment. Pt verified accuracy of facesheet. Pt lives in a s story condo with room-mate in [place].  Pt uses [facility]. Prior to current hospitalization, pt was completely independent in ADLS/IADLS. Pt drives and is able to attend necessary MD appointments.  Pt has a good support system. Pt denies any hx of substance use and denies any dx of mh. Owns no DME.Physician signed Cert of Comp and pt and sister  working on AD.    Care Transition Team Assessment    Information Source:pt.  Orientation Level: Oriented X4  Information Given By: Patient  Informant's Name: Gold  Who is responsible for making decisions for patient? : Patient         Elopement Risk  Legal Hold: No  Ambulatory or Self Mobile in Wheelchair: Yes  Disoriented: No  Psychiatric Symptoms: None  History of Wandering: No  Elopement this Admit: No  Vocalizing Wanting to Leave: No  Displays Behaviors, Body Language Wanting to Leave: No-Not at Risk for Elopement  Elopement Risk: Not at Risk for Elopement    Interdisciplinary Discharge Planning  Does Admitting Nurse Feel This Could be a Complex Discharge?: No  Primary Care Physician: none  Lives with - Patient's Self Care Capacity: Unrelated Adult  Patient or legal guardian wants to designate a caregiver: No  Support Systems: Family Member(s)  Housing / Facility: 2 Story Apartment / Condo  Able to Return to Previous ADL's: No  Mobility Issues: No    Discharge Preparedness  What is your plan after discharge?: Home with help  What are your discharge supports?: Sibling  Prior Functional Level: Ambulatory, Drives Self, Independent with Activities of Daily Living, Independent with Medication Management  Difficulity with ADLs: None  Difficulity with IADLs: None    Functional Assesment  Prior Functional Level: Ambulatory, Drives Self, Independent with  Activities of Daily Living, Independent with Medication Management    Finances  Prescription Coverage: Yes              Advance Directive  Advance Directive?: DPOA for Health Care    Domestic Abuse  Possible Abuse/Neglect Reported to:: Not Applicable    Psychological Assessment  History of Substance Abuse: None  History of Psychiatric Problems: No         Anticipated Discharge Information  Discharge Disposition: Discharged to home/self care (01)

## 2024-09-25 ENCOUNTER — APPOINTMENT (OUTPATIENT)
Dept: RADIOLOGY | Facility: MEDICAL CENTER | Age: 64
DRG: 235 | End: 2024-09-25
Attending: THORACIC SURGERY (CARDIOTHORACIC VASCULAR SURGERY)
Payer: COMMERCIAL

## 2024-09-25 ENCOUNTER — APPOINTMENT (OUTPATIENT)
Dept: CARDIOLOGY | Facility: MEDICAL CENTER | Age: 64
DRG: 235 | End: 2024-09-25
Attending: ANESTHESIOLOGY
Payer: COMMERCIAL

## 2024-09-25 ENCOUNTER — ANESTHESIA (OUTPATIENT)
Dept: SURGERY | Facility: MEDICAL CENTER | Age: 64
End: 2024-09-25
Payer: COMMERCIAL

## 2024-09-25 ENCOUNTER — ANESTHESIA EVENT (OUTPATIENT)
Dept: SURGERY | Facility: MEDICAL CENTER | Age: 64
End: 2024-09-25
Payer: COMMERCIAL

## 2024-09-25 PROBLEM — Z95.1 S/P CABG (CORONARY ARTERY BYPASS GRAFT): Status: ACTIVE | Noted: 2024-09-25

## 2024-09-25 LAB
ACT BLD: 122 SEC (ref 74–137)
ACT BLD: 152 SEC (ref 74–137)
ACT BLD: 501 SEC (ref 74–137)
ACT BLD: 593 SEC (ref 74–137)
ACT BLD: 593 SEC (ref 74–137)
ANION GAP SERPL CALC-SCNC: 14 MMOL/L (ref 7–16)
APTT PPP: 35 SEC (ref 24.7–36)
ARTERIAL PATENCY WRIST A: ABNORMAL
BASE EXCESS BLDA CALC-SCNC: -2 MMOL/L (ref -4–3)
BASE EXCESS BLDA CALC-SCNC: -3 MMOL/L (ref -4–3)
BASE EXCESS BLDA CALC-SCNC: -3 MMOL/L (ref -4–3)
BASE EXCESS BLDA CALC-SCNC: -4 MMOL/L (ref -4–3)
BASE EXCESS BLDA CALC-SCNC: -4 MMOL/L (ref -4–3)
BASE EXCESS BLDA CALC-SCNC: -5 MMOL/L (ref -4–3)
BASE EXCESS BLDA CALC-SCNC: -6 MMOL/L (ref -4–3)
BASE EXCESS BLDA CALC-SCNC: -7 MMOL/L (ref -4–3)
BASE EXCESS BLDA CALC-SCNC: -9 MMOL/L (ref -4–3)
BASE EXCESS BLDV CALC-SCNC: -3 MMOL/L (ref -4–3)
BODY TEMPERATURE: ABNORMAL DEGREES
BUN SERPL-MCNC: 14 MG/DL (ref 8–22)
CA-I BLD ISE-SCNC: 0.88 MMOL/L (ref 1.1–1.3)
CA-I BLD ISE-SCNC: 0.92 MMOL/L (ref 1.1–1.3)
CA-I BLD ISE-SCNC: 1.08 MMOL/L (ref 1.1–1.3)
CA-I BLD ISE-SCNC: 1.1 MMOL/L (ref 1.1–1.3)
CALCIUM SERPL-MCNC: 9 MG/DL (ref 8.5–10.5)
CHLORIDE SERPL-SCNC: 107 MMOL/L (ref 96–112)
CO2 BLDA-SCNC: 18 MMOL/L (ref 20–33)
CO2 BLDA-SCNC: 19 MMOL/L (ref 20–33)
CO2 BLDA-SCNC: 21 MMOL/L (ref 20–33)
CO2 BLDA-SCNC: 21 MMOL/L (ref 20–33)
CO2 BLDA-SCNC: 23 MMOL/L (ref 20–33)
CO2 BLDA-SCNC: 24 MMOL/L (ref 20–33)
CO2 BLDA-SCNC: 24 MMOL/L (ref 20–33)
CO2 BLDV-SCNC: 24 MMOL/L (ref 20–33)
CO2 SERPL-SCNC: 19 MMOL/L (ref 20–33)
CREAT SERPL-MCNC: 0.88 MG/DL (ref 0.5–1.4)
DELSYS IDSYS: ABNORMAL
EKG IMPRESSION: NORMAL
EKG IMPRESSION: NORMAL
END TIDAL CARBON DIOXIDE IECO2: 30 MMHG
END TIDAL CARBON DIOXIDE IECO2: 32 MMHG
END TIDAL CARBON DIOXIDE IECO2: 39 MMHG
END TIDAL CARBON DIOXIDE IECO2: 39 MMHG
END TIDAL CARBON DIOXIDE IECO2: 41 MMHG
ERYTHROCYTE [DISTWIDTH] IN BLOOD BY AUTOMATED COUNT: 41.7 FL (ref 35.9–50)
GFR SERPLBLD CREATININE-BSD FMLA CKD-EPI: 97 ML/MIN/1.73 M 2
GLUCOSE BLD STRIP.AUTO-MCNC: 104 MG/DL (ref 65–99)
GLUCOSE BLD STRIP.AUTO-MCNC: 138 MG/DL (ref 65–99)
GLUCOSE BLD STRIP.AUTO-MCNC: 155 MG/DL (ref 65–99)
GLUCOSE BLD STRIP.AUTO-MCNC: 166 MG/DL (ref 65–99)
GLUCOSE BLD STRIP.AUTO-MCNC: 173 MG/DL (ref 65–99)
GLUCOSE BLD STRIP.AUTO-MCNC: 95 MG/DL (ref 65–99)
GLUCOSE SERPL-MCNC: 101 MG/DL (ref 65–99)
HCO3 BLDA-SCNC: 16.7 MMOL/L (ref 17–25)
HCO3 BLDA-SCNC: 17.8 MMOL/L (ref 17–25)
HCO3 BLDA-SCNC: 20 MMOL/L (ref 17–25)
HCO3 BLDA-SCNC: 20 MMOL/L (ref 17–25)
HCO3 BLDA-SCNC: 21.3 MMOL/L (ref 17–25)
HCO3 BLDA-SCNC: 21.6 MMOL/L (ref 17–25)
HCO3 BLDA-SCNC: 21.7 MMOL/L (ref 17–25)
HCO3 BLDA-SCNC: 21.8 MMOL/L (ref 17–25)
HCO3 BLDA-SCNC: 21.9 MMOL/L (ref 17–25)
HCO3 BLDA-SCNC: 22.3 MMOL/L (ref 17–25)
HCO3 BLDA-SCNC: 22.7 MMOL/L (ref 17–25)
HCO3 BLDV-SCNC: 22.4 MMOL/L (ref 24–28)
HCT VFR BLD AUTO: 40.5 % (ref 42–52)
HCT VFR BLD AUTO: 46.8 % (ref 42–52)
HCT VFR BLD CALC: 26 % (ref 42–52)
HCT VFR BLD CALC: 27 % (ref 42–52)
HCT VFR BLD CALC: 31 % (ref 42–52)
HCT VFR BLD CALC: 41 % (ref 42–52)
HCT VFR BLD CALC: 42 % (ref 42–52)
HGB BLD-MCNC: 10.5 G/DL (ref 14–18)
HGB BLD-MCNC: 13.6 G/DL (ref 14–18)
HGB BLD-MCNC: 13.9 G/DL (ref 14–18)
HGB BLD-MCNC: 14.3 G/DL (ref 14–18)
HGB BLD-MCNC: 15.8 G/DL (ref 14–18)
HGB BLD-MCNC: 8.8 G/DL (ref 14–18)
HGB BLD-MCNC: 9.2 G/DL (ref 14–18)
HOROWITZ INDEX BLDA+IHG-RTO: 126 MM[HG]
HOROWITZ INDEX BLDA+IHG-RTO: 152 MM[HG]
HOROWITZ INDEX BLDA+IHG-RTO: 162 MM[HG]
HOROWITZ INDEX BLDA+IHG-RTO: 193 MM[HG]
HOROWITZ INDEX BLDA+IHG-RTO: 215 MM[HG]
HOROWITZ INDEX BLDA+IHG-RTO: 80 MM[HG]
HOROWITZ INDEX BLDA+IHG-RTO: 89 MM[HG]
INR PPP: 1.65 (ref 0.87–1.13)
LACTATE BLD-SCNC: 1.3 MMOL/L (ref 0.5–2)
LACTATE BLD-SCNC: 1.9 MMOL/L (ref 0.5–2)
LACTATE BLD-SCNC: 2.6 MMOL/L (ref 0.5–2)
LACTATE BLD-SCNC: 2.7 MMOL/L (ref 0.5–2)
LACTATE BLD-SCNC: 4.2 MMOL/L (ref 0.5–2)
LACTATE BLD-SCNC: 6.3 MMOL/L (ref 0.5–2)
MAGNESIUM SERPL-MCNC: 1.9 MG/DL (ref 1.5–2.5)
MAGNESIUM SERPL-MCNC: 2.8 MG/DL (ref 1.5–2.5)
MCH RBC QN AUTO: 29.3 PG (ref 27–33)
MCHC RBC AUTO-ENTMCNC: 33.8 G/DL (ref 32.3–36.5)
MCV RBC AUTO: 86.8 FL (ref 81.4–97.8)
MODE IMODE: ABNORMAL
O2/TOTAL GAS SETTING VFR VENT: 100 %
O2/TOTAL GAS SETTING VFR VENT: 100 %
O2/TOTAL GAS SETTING VFR VENT: 40 %
O2/TOTAL GAS SETTING VFR VENT: 40 %
O2/TOTAL GAS SETTING VFR VENT: 50 %
O2/TOTAL GAS SETTING VFR VENT: 60 %
O2/TOTAL GAS SETTING VFR VENT: 80 %
PCO2 BLDA: 33 MMHG (ref 26–37)
PCO2 BLDA: 36 MMHG (ref 26–37)
PCO2 BLDA: 37.5 MMHG (ref 26–37)
PCO2 BLDA: 38.1 MMHG (ref 26–37)
PCO2 BLDA: 38.9 MMHG (ref 26–37)
PCO2 BLDA: 41 MMHG (ref 26–37)
PCO2 BLDA: 41.2 MMHG (ref 26–37)
PCO2 BLDA: 41.3 MMHG (ref 26–37)
PCO2 BLDA: 41.8 MMHG (ref 26–37)
PCO2 BLDA: 43.3 MMHG (ref 26–37)
PCO2 BLDA: 43.4 MMHG (ref 26–37)
PCO2 BLDV: 43.1 MMHG (ref 41–51)
PCO2 TEMP ADJ BLDA: 33.5 MMHG (ref 26–37)
PCO2 TEMP ADJ BLDA: 33.9 MMHG (ref 26–37)
PCO2 TEMP ADJ BLDA: 34 MMHG (ref 26–37)
PCO2 TEMP ADJ BLDA: 35 MMHG (ref 26–37)
PCO2 TEMP ADJ BLDA: 38 MMHG (ref 26–37)
PCO2 TEMP ADJ BLDA: 38.4 MMHG (ref 26–37)
PCO2 TEMP ADJ BLDA: 39 MMHG (ref 26–37)
PCO2 TEMP ADJ BLDA: 39.1 MMHG (ref 26–37)
PCO2 TEMP ADJ BLDA: 41.6 MMHG (ref 26–37)
PCO2 TEMP ADJ BLDA: 42.1 MMHG (ref 26–37)
PCO2 TEMP ADJ BLDA: 42.4 MMHG (ref 26–37)
PCO2 TEMP ADJ BLDV: 37.8 MMHG (ref 41–51)
PEEP END EXPIRATORY PRESSURE IPEEP: 8 CMH20
PERCENT MINUTE VOLUME IPMV: 130
PERCENT MINUTE VOLUME IPMV: 160
PH BLDA: 7.27 [PH] (ref 7.4–7.5)
PH BLDA: 7.29 [PH] (ref 7.4–7.5)
PH BLDA: 7.3 [PH] (ref 7.4–7.5)
PH BLDA: 7.31 [PH] (ref 7.4–7.5)
PH BLDA: 7.33 [PH] (ref 7.4–7.5)
PH BLDA: 7.34 [PH] (ref 7.4–7.5)
PH BLDA: 7.34 [PH] (ref 7.4–7.5)
PH BLDA: 7.37 [PH] (ref 7.4–7.5)
PH BLDA: 7.38 [PH] (ref 7.4–7.5)
PH BLDV: 7.32 [PH] (ref 7.31–7.45)
PH TEMP ADJ BLDA: 7.3 [PH] (ref 7.4–7.5)
PH TEMP ADJ BLDA: 7.31 [PH] (ref 7.4–7.5)
PH TEMP ADJ BLDA: 7.31 [PH] (ref 7.4–7.5)
PH TEMP ADJ BLDA: 7.33 [PH] (ref 7.4–7.5)
PH TEMP ADJ BLDA: 7.34 [PH] (ref 7.4–7.5)
PH TEMP ADJ BLDA: 7.36 [PH] (ref 7.4–7.5)
PH TEMP ADJ BLDA: 7.36 [PH] (ref 7.4–7.5)
PH TEMP ADJ BLDA: 7.38 [PH] (ref 7.4–7.5)
PH TEMP ADJ BLDA: 7.4 [PH] (ref 7.4–7.5)
PH TEMP ADJ BLDV: 7.37 [PH] (ref 7.31–7.45)
PLATELET # BLD AUTO: 135 K/UL (ref 164–446)
PLATELET # BLD AUTO: 261 K/UL (ref 164–446)
PMV BLD AUTO: 10.4 FL (ref 9–12.9)
PO2 BLDA: 101 MMHG (ref 64–87)
PO2 BLDA: 171 MMHG (ref 64–87)
PO2 BLDA: 326 MMHG (ref 64–87)
PO2 BLDA: 342 MMHG (ref 64–87)
PO2 BLDA: 436 MMHG (ref 64–87)
PO2 BLDA: 77 MMHG (ref 64–87)
PO2 BLDA: 80 MMHG (ref 64–87)
PO2 BLDA: 81 MMHG (ref 64–87)
PO2 BLDA: 86 MMHG (ref 64–87)
PO2 BLDA: 89 MMHG (ref 64–87)
PO2 BLDA: 91 MMHG (ref 64–87)
PO2 BLDV: 52 MMHG (ref 25–40)
PO2 TEMP ADJ BLDA: 163 MMHG (ref 64–87)
PO2 TEMP ADJ BLDA: 323 MMHG (ref 64–87)
PO2 TEMP ADJ BLDA: 329 MMHG (ref 64–87)
PO2 TEMP ADJ BLDA: 421 MMHG (ref 64–87)
PO2 TEMP ADJ BLDA: 72 MMHG (ref 64–87)
PO2 TEMP ADJ BLDA: 79 MMHG (ref 64–87)
PO2 TEMP ADJ BLDA: 80 MMHG (ref 64–87)
PO2 TEMP ADJ BLDA: 82 MMHG (ref 64–87)
PO2 TEMP ADJ BLDA: 87 MMHG (ref 64–87)
PO2 TEMP ADJ BLDA: 89 MMHG (ref 64–87)
PO2 TEMP ADJ BLDA: 94 MMHG (ref 64–87)
PO2 TEMP ADJ BLDV: 42 MMHG (ref 25–40)
POTASSIUM BLD-SCNC: 3.8 MMOL/L (ref 3.6–5.5)
POTASSIUM BLD-SCNC: 3.9 MMOL/L (ref 3.6–5.5)
POTASSIUM BLD-SCNC: 4 MMOL/L (ref 3.6–5.5)
POTASSIUM BLD-SCNC: 4.5 MMOL/L (ref 3.6–5.5)
POTASSIUM BLD-SCNC: 4.6 MMOL/L (ref 3.6–5.5)
POTASSIUM BLD-SCNC: 4.7 MMOL/L (ref 3.6–5.5)
POTASSIUM SERPL-SCNC: 3.7 MMOL/L (ref 3.6–5.5)
POTASSIUM SERPL-SCNC: 3.9 MMOL/L (ref 3.6–5.5)
POTASSIUM SERPL-SCNC: 4 MMOL/L (ref 3.6–5.5)
POTASSIUM SERPL-SCNC: 4.3 MMOL/L (ref 3.6–5.5)
PRESSURE SUPPORT SETTING VENT: 5 CM[H2O]
PROTHROMBIN TIME: 19.6 SEC (ref 12–14.6)
RBC # BLD AUTO: 5.39 M/UL (ref 4.7–6.1)
SAO2 % BLDA: 100 % (ref 93–99)
SAO2 % BLDA: 94 % (ref 93–99)
SAO2 % BLDA: 95 % (ref 93–99)
SAO2 % BLDA: 95 % (ref 93–99)
SAO2 % BLDA: 96 % (ref 93–99)
SAO2 % BLDA: 97 % (ref 93–99)
SAO2 % BLDA: 99 % (ref 93–99)
SAO2 % BLDV: 84 %
SODIUM BLD-SCNC: 136 MMOL/L (ref 135–145)
SODIUM BLD-SCNC: 137 MMOL/L (ref 135–145)
SODIUM BLD-SCNC: 138 MMOL/L (ref 135–145)
SODIUM BLD-SCNC: 140 MMOL/L (ref 135–145)
SODIUM SERPL-SCNC: 140 MMOL/L (ref 135–145)
SPECIMEN DRAWN FROM PATIENT: ABNORMAL
WBC # BLD AUTO: 9.6 K/UL (ref 4.8–10.8)

## 2024-09-25 PROCEDURE — B24BZZ4 ULTRASONOGRAPHY OF HEART WITH AORTA, TRANSESOPHAGEAL: ICD-10-PCS | Performed by: THORACIC SURGERY (CARDIOTHORACIC VASCULAR SURGERY)

## 2024-09-25 PROCEDURE — 700102 HCHG RX REV CODE 250 W/ 637 OVERRIDE(OP): Performed by: NURSE PRACTITIONER

## 2024-09-25 PROCEDURE — 503001 HCHG PERFUSION: Performed by: THORACIC SURGERY (CARDIOTHORACIC VASCULAR SURGERY)

## 2024-09-25 PROCEDURE — 85049 AUTOMATED PLATELET COUNT: CPT

## 2024-09-25 PROCEDURE — 84295 ASSAY OF SERUM SODIUM: CPT

## 2024-09-25 PROCEDURE — 700101 HCHG RX REV CODE 250: Performed by: ANESTHESIOLOGY

## 2024-09-25 PROCEDURE — 33508 ENDOSCOPIC VEIN HARVEST: CPT | Mod: AS,59 | Performed by: NURSE PRACTITIONER

## 2024-09-25 PROCEDURE — 93005 ELECTROCARDIOGRAM TRACING: CPT | Performed by: NURSE PRACTITIONER

## 2024-09-25 PROCEDURE — 82803 BLOOD GASES ANY COMBINATION: CPT

## 2024-09-25 PROCEDURE — 33508 ENDOSCOPIC VEIN HARVEST: CPT | Mod: 59 | Performed by: THORACIC SURGERY (CARDIOTHORACIC VASCULAR SURGERY)

## 2024-09-25 PROCEDURE — 85014 HEMATOCRIT: CPT | Mod: 91

## 2024-09-25 PROCEDURE — 83605 ASSAY OF LACTIC ACID: CPT | Mod: 91

## 2024-09-25 PROCEDURE — 94150 VITAL CAPACITY TEST: CPT

## 2024-09-25 PROCEDURE — 85347 COAGULATION TIME ACTIVATED: CPT

## 2024-09-25 PROCEDURE — 700105 HCHG RX REV CODE 258: Performed by: NURSE PRACTITIONER

## 2024-09-25 PROCEDURE — 700111 HCHG RX REV CODE 636 W/ 250 OVERRIDE (IP)

## 2024-09-25 PROCEDURE — 700111 HCHG RX REV CODE 636 W/ 250 OVERRIDE (IP): Performed by: ANESTHESIOLOGY

## 2024-09-25 PROCEDURE — 99291 CRITICAL CARE FIRST HOUR: CPT | Performed by: EMERGENCY MEDICINE

## 2024-09-25 PROCEDURE — 93010 ELECTROCARDIOGRAM REPORT: CPT | Mod: 76 | Performed by: INTERNAL MEDICINE

## 2024-09-25 PROCEDURE — 94799 UNLISTED PULMONARY SVC/PX: CPT

## 2024-09-25 PROCEDURE — 94002 VENT MGMT INPAT INIT DAY: CPT

## 2024-09-25 PROCEDURE — 37799 UNLISTED PX VASCULAR SURGERY: CPT

## 2024-09-25 PROCEDURE — C1751 CATH, INF, PER/CENT/MIDLINE: HCPCS | Performed by: THORACIC SURGERY (CARDIOTHORACIC VASCULAR SURGERY)

## 2024-09-25 PROCEDURE — 160009 HCHG ANES TIME/MIN: Performed by: THORACIC SURGERY (CARDIOTHORACIC VASCULAR SURGERY)

## 2024-09-25 PROCEDURE — 160031 HCHG SURGERY MINUTES - 1ST 30 MINS LEVEL 5: Performed by: THORACIC SURGERY (CARDIOTHORACIC VASCULAR SURGERY)

## 2024-09-25 PROCEDURE — 85730 THROMBOPLASTIN TIME PARTIAL: CPT

## 2024-09-25 PROCEDURE — 700102 HCHG RX REV CODE 250 W/ 637 OVERRIDE(OP): Performed by: THORACIC SURGERY (CARDIOTHORACIC VASCULAR SURGERY)

## 2024-09-25 PROCEDURE — A9270 NON-COVERED ITEM OR SERVICE: HCPCS | Performed by: NURSE PRACTITIONER

## 2024-09-25 PROCEDURE — 94003 VENT MGMT INPAT SUBQ DAY: CPT

## 2024-09-25 PROCEDURE — 5A1223Z PERFORMANCE OF CARDIAC PACING, CONTINUOUS: ICD-10-PCS | Performed by: THORACIC SURGERY (CARDIOTHORACIC VASCULAR SURGERY)

## 2024-09-25 PROCEDURE — 85018 HEMOGLOBIN: CPT

## 2024-09-25 PROCEDURE — 160042 HCHG SURGERY MINUTES - EA ADDL 1 MIN LEVEL 5: Performed by: THORACIC SURGERY (CARDIOTHORACIC VASCULAR SURGERY)

## 2024-09-25 PROCEDURE — 02100Z9 BYPASS CORONARY ARTERY, ONE ARTERY FROM LEFT INTERNAL MAMMARY, OPEN APPROACH: ICD-10-PCS | Performed by: THORACIC SURGERY (CARDIOTHORACIC VASCULAR SURGERY)

## 2024-09-25 PROCEDURE — 85027 COMPLETE CBC AUTOMATED: CPT

## 2024-09-25 PROCEDURE — 93010 ELECTROCARDIOGRAM REPORT: CPT | Performed by: INTERNAL MEDICINE

## 2024-09-25 PROCEDURE — 700105 HCHG RX REV CODE 258

## 2024-09-25 PROCEDURE — 33533 CABG ARTERIAL SINGLE: CPT | Mod: AS | Performed by: NURSE PRACTITIONER

## 2024-09-25 PROCEDURE — 06BP0ZZ EXCISION OF RIGHT SAPHENOUS VEIN, OPEN APPROACH: ICD-10-PCS | Performed by: THORACIC SURGERY (CARDIOTHORACIC VASCULAR SURGERY)

## 2024-09-25 PROCEDURE — C1729 CATH, DRAINAGE: HCPCS | Performed by: THORACIC SURGERY (CARDIOTHORACIC VASCULAR SURGERY)

## 2024-09-25 PROCEDURE — 33519 CABG ARTERY-VEIN THREE: CPT | Mod: AS | Performed by: NURSE PRACTITIONER

## 2024-09-25 PROCEDURE — 93325 DOPPLER ECHO COLOR FLOW MAPG: CPT

## 2024-09-25 PROCEDURE — 85610 PROTHROMBIN TIME: CPT

## 2024-09-25 PROCEDURE — C1894 INTRO/SHEATH, NON-LASER: HCPCS | Performed by: THORACIC SURGERY (CARDIOTHORACIC VASCULAR SURGERY)

## 2024-09-25 PROCEDURE — 80048 BASIC METABOLIC PNL TOTAL CA: CPT

## 2024-09-25 PROCEDURE — 82962 GLUCOSE BLOOD TEST: CPT | Mod: 91

## 2024-09-25 PROCEDURE — 700105 HCHG RX REV CODE 258: Performed by: THORACIC SURGERY (CARDIOTHORACIC VASCULAR SURGERY)

## 2024-09-25 PROCEDURE — 700111 HCHG RX REV CODE 636 W/ 250 OVERRIDE (IP): Performed by: THORACIC SURGERY (CARDIOTHORACIC VASCULAR SURGERY)

## 2024-09-25 PROCEDURE — 36620 INSERTION CATHETER ARTERY: CPT | Performed by: THORACIC SURGERY (CARDIOTHORACIC VASCULAR SURGERY)

## 2024-09-25 PROCEDURE — 84132 ASSAY OF SERUM POTASSIUM: CPT | Mod: 91

## 2024-09-25 PROCEDURE — 33519 CABG ARTERY-VEIN THREE: CPT | Performed by: THORACIC SURGERY (CARDIOTHORACIC VASCULAR SURGERY)

## 2024-09-25 PROCEDURE — 82330 ASSAY OF CALCIUM: CPT

## 2024-09-25 PROCEDURE — 700105 HCHG RX REV CODE 258: Performed by: ANESTHESIOLOGY

## 2024-09-25 PROCEDURE — 33533 CABG ARTERIAL SINGLE: CPT | Performed by: THORACIC SURGERY (CARDIOTHORACIC VASCULAR SURGERY)

## 2024-09-25 PROCEDURE — 160048 HCHG OR STATISTICAL LEVEL 1-5: Performed by: THORACIC SURGERY (CARDIOTHORACIC VASCULAR SURGERY)

## 2024-09-25 PROCEDURE — C1768 GRAFT, VASCULAR: HCPCS | Performed by: THORACIC SURGERY (CARDIOTHORACIC VASCULAR SURGERY)

## 2024-09-25 PROCEDURE — 5A1221Z PERFORMANCE OF CARDIAC OUTPUT, CONTINUOUS: ICD-10-PCS | Performed by: THORACIC SURGERY (CARDIOTHORACIC VASCULAR SURGERY)

## 2024-09-25 PROCEDURE — P9047 ALBUMIN (HUMAN), 25%, 50ML: HCPCS

## 2024-09-25 PROCEDURE — 770022 HCHG ROOM/CARE - ICU (200)

## 2024-09-25 PROCEDURE — 700111 HCHG RX REV CODE 636 W/ 250 OVERRIDE (IP): Performed by: NURSE PRACTITIONER

## 2024-09-25 PROCEDURE — C1898 LEAD, PMKR, OTHER THAN TRANS: HCPCS | Performed by: THORACIC SURGERY (CARDIOTHORACIC VASCULAR SURGERY)

## 2024-09-25 PROCEDURE — 110371 HCHG SHELL REV 272: Performed by: THORACIC SURGERY (CARDIOTHORACIC VASCULAR SURGERY)

## 2024-09-25 PROCEDURE — 0212093 BYPASS CORONARY ARTERY, THREE ARTERIES FROM CORONARY ARTERY WITH AUTOLOGOUS VENOUS TISSUE, OPEN APPROACH: ICD-10-PCS | Performed by: THORACIC SURGERY (CARDIOTHORACIC VASCULAR SURGERY)

## 2024-09-25 PROCEDURE — 700101 HCHG RX REV CODE 250

## 2024-09-25 PROCEDURE — 83735 ASSAY OF MAGNESIUM: CPT

## 2024-09-25 PROCEDURE — 71045 X-RAY EXAM CHEST 1 VIEW: CPT

## 2024-09-25 DEVICE — MARKER GRAFT AC VEIN DISK SHAPED (10EA/BX): Type: IMPLANTABLE DEVICE | Site: HEART | Status: FUNCTIONAL

## 2024-09-25 RX ORDER — SODIUM CHLORIDE 9 MG/ML
INJECTION, SOLUTION INTRAVENOUS CONTINUOUS
Status: DISCONTINUED | OUTPATIENT
Start: 2024-09-25 | End: 2024-09-27

## 2024-09-25 RX ORDER — MAGNESIUM SULFATE 1 G/100ML
1 INJECTION INTRAVENOUS DAILY
Status: COMPLETED | OUTPATIENT
Start: 2024-09-25 | End: 2024-09-27

## 2024-09-25 RX ORDER — PAPAVERINE HYDROCHLORIDE 30 MG/ML
INJECTION INTRAMUSCULAR; INTRAVENOUS
Status: DISCONTINUED | OUTPATIENT
Start: 2024-09-25 | End: 2024-09-25 | Stop reason: HOSPADM

## 2024-09-25 RX ORDER — MIDAZOLAM HYDROCHLORIDE 1 MG/ML
INJECTION INTRAMUSCULAR; INTRAVENOUS PRN
Status: DISCONTINUED | OUTPATIENT
Start: 2024-09-25 | End: 2024-09-25

## 2024-09-25 RX ORDER — METHADONE HYDROCHLORIDE 10 MG/ML
INJECTION, SOLUTION INTRAMUSCULAR; INTRAVENOUS; SUBCUTANEOUS PRN
Status: DISCONTINUED | OUTPATIENT
Start: 2024-09-25 | End: 2024-09-25

## 2024-09-25 RX ORDER — NOREPINEPHRINE BITARTRATE 0.03 MG/ML
0-1 INJECTION, SOLUTION INTRAVENOUS CONTINUOUS
Status: DISCONTINUED | OUTPATIENT
Start: 2024-09-25 | End: 2024-09-27

## 2024-09-25 RX ORDER — DEXTROSE MONOHYDRATE 25 G/50ML
12.5-25 INJECTION, SOLUTION INTRAVENOUS PRN
Status: DISCONTINUED | OUTPATIENT
Start: 2024-09-25 | End: 2024-09-26

## 2024-09-25 RX ORDER — PROTAMINE SULFATE 10 MG/ML
INJECTION, SOLUTION INTRAVENOUS PRN
Status: DISCONTINUED | OUTPATIENT
Start: 2024-09-25 | End: 2024-09-25 | Stop reason: SURG

## 2024-09-25 RX ORDER — ASPIRIN 81 MG/1
81 TABLET ORAL DAILY
Status: DISCONTINUED | OUTPATIENT
Start: 2024-09-26 | End: 2024-09-30 | Stop reason: HOSPADM

## 2024-09-25 RX ORDER — POTASSIUM CHLORIDE 7.45 MG/ML
10 INJECTION INTRAVENOUS ONCE
Status: COMPLETED | OUTPATIENT
Start: 2024-09-25 | End: 2024-09-25

## 2024-09-25 RX ORDER — HEPARIN SODIUM,PORCINE 1000/ML
VIAL (ML) INJECTION PRN
Status: DISCONTINUED | OUTPATIENT
Start: 2024-09-25 | End: 2024-09-25 | Stop reason: SURG

## 2024-09-25 RX ORDER — AMOXICILLIN 250 MG
2 CAPSULE ORAL 2 TIMES DAILY
Status: DISCONTINUED | OUTPATIENT
Start: 2024-09-25 | End: 2024-09-30 | Stop reason: HOSPADM

## 2024-09-25 RX ORDER — OXYCODONE HYDROCHLORIDE 10 MG/1
10 TABLET ORAL
Status: DISCONTINUED | OUTPATIENT
Start: 2024-09-25 | End: 2024-09-30 | Stop reason: HOSPADM

## 2024-09-25 RX ORDER — MORPHINE SULFATE 4 MG/ML
4 INJECTION INTRAVENOUS
Status: DISCONTINUED | OUTPATIENT
Start: 2024-09-25 | End: 2024-09-26

## 2024-09-25 RX ORDER — SODIUM CHLORIDE 9 MG/ML
INJECTION, SOLUTION INTRAVENOUS
Status: DISCONTINUED | OUTPATIENT
Start: 2024-09-25 | End: 2024-09-25 | Stop reason: SURG

## 2024-09-25 RX ORDER — ONDANSETRON 2 MG/ML
8 INJECTION INTRAMUSCULAR; INTRAVENOUS EVERY 6 HOURS PRN
Status: DISCONTINUED | OUTPATIENT
Start: 2024-09-25 | End: 2024-09-30 | Stop reason: HOSPADM

## 2024-09-25 RX ORDER — ACETAMINOPHEN 500 MG
1000 TABLET ORAL EVERY 6 HOURS PRN
Status: DISCONTINUED | OUTPATIENT
Start: 2024-10-05 | End: 2024-09-30 | Stop reason: HOSPADM

## 2024-09-25 RX ORDER — SODIUM CHLORIDE, SODIUM GLUCONATE, SODIUM ACETATE, POTASSIUM CHLORIDE AND MAGNESIUM CHLORIDE 526; 502; 368; 37; 30 MG/100ML; MG/100ML; MG/100ML; MG/100ML; MG/100ML
INJECTION, SOLUTION INTRAVENOUS
Status: DISCONTINUED | OUTPATIENT
Start: 2024-09-25 | End: 2024-09-25

## 2024-09-25 RX ORDER — POTASSIUM CHLORIDE 14.9 MG/ML
20 INJECTION INTRAVENOUS ONCE
Status: COMPLETED | OUTPATIENT
Start: 2024-09-26 | End: 2024-09-26

## 2024-09-25 RX ORDER — DOBUTAMINE HYDROCHLORIDE 100 MG/100ML
5 INJECTION INTRAVENOUS CONTINUOUS
Status: DISCONTINUED | OUTPATIENT
Start: 2024-09-25 | End: 2024-09-27

## 2024-09-25 RX ORDER — PROCHLORPERAZINE EDISYLATE 5 MG/ML
10 INJECTION INTRAMUSCULAR; INTRAVENOUS EVERY 6 HOURS PRN
Status: DISCONTINUED | OUTPATIENT
Start: 2024-09-25 | End: 2024-09-30 | Stop reason: HOSPADM

## 2024-09-25 RX ORDER — CARVEDILOL 3.12 MG/1
3.12 TABLET ORAL 2 TIMES DAILY WITH MEALS
Status: DISCONTINUED | OUTPATIENT
Start: 2024-09-26 | End: 2024-09-26

## 2024-09-25 RX ORDER — OXYCODONE HYDROCHLORIDE 5 MG/1
5 TABLET ORAL
Status: DISCONTINUED | OUTPATIENT
Start: 2024-09-25 | End: 2024-09-30 | Stop reason: HOSPADM

## 2024-09-25 RX ORDER — EPHEDRINE SULFATE 50 MG/ML
INJECTION, SOLUTION INTRAVENOUS PRN
Status: DISCONTINUED | OUTPATIENT
Start: 2024-09-25 | End: 2024-09-25

## 2024-09-25 RX ORDER — DIPHENHYDRAMINE HCL 25 MG
25 TABLET ORAL
Status: DISCONTINUED | OUTPATIENT
Start: 2024-09-25 | End: 2024-09-30 | Stop reason: HOSPADM

## 2024-09-25 RX ORDER — ALUMINA, MAGNESIA, AND SIMETHICONE 2400; 2400; 240 MG/30ML; MG/30ML; MG/30ML
30 SUSPENSION ORAL EVERY 4 HOURS PRN
Status: DISCONTINUED | OUTPATIENT
Start: 2024-09-25 | End: 2024-09-30 | Stop reason: HOSPADM

## 2024-09-25 RX ORDER — EPINEPHRINE HCL IN 0.9 % NACL 4MG/250ML
0-.5 PLASTIC BAG, INJECTION (ML) INTRAVENOUS CONTINUOUS
Status: DISCONTINUED | OUTPATIENT
Start: 2024-09-25 | End: 2024-09-27

## 2024-09-25 RX ORDER — DEXMEDETOMIDINE HYDROCHLORIDE 4 UG/ML
0-1.5 INJECTION, SOLUTION INTRAVENOUS CONTINUOUS
Status: DISCONTINUED | OUTPATIENT
Start: 2024-09-25 | End: 2024-09-26

## 2024-09-25 RX ORDER — VECURONIUM BROMIDE 1 MG/ML
INJECTION, POWDER, LYOPHILIZED, FOR SOLUTION INTRAVENOUS PRN
Status: DISCONTINUED | OUTPATIENT
Start: 2024-09-25 | End: 2024-09-25 | Stop reason: SURG

## 2024-09-25 RX ORDER — ENOXAPARIN SODIUM 100 MG/ML
40 INJECTION SUBCUTANEOUS DAILY
Status: DISCONTINUED | OUTPATIENT
Start: 2024-09-26 | End: 2024-09-30 | Stop reason: HOSPADM

## 2024-09-25 RX ORDER — NITROGLYCERIN 20 MG/100ML
0-100 INJECTION INTRAVENOUS CONTINUOUS
Status: DISCONTINUED | OUTPATIENT
Start: 2024-09-25 | End: 2024-09-27

## 2024-09-25 RX ORDER — METHADONE HYDROCHLORIDE 10 MG/ML
20 INJECTION, SOLUTION INTRAMUSCULAR; INTRAVENOUS; SUBCUTANEOUS ONCE
Status: DISCONTINUED | OUTPATIENT
Start: 2024-09-25 | End: 2024-09-25 | Stop reason: HOSPADM

## 2024-09-25 RX ORDER — CEFAZOLIN SODIUM 1 G/3ML
INJECTION, POWDER, FOR SOLUTION INTRAMUSCULAR; INTRAVENOUS PRN
Status: DISCONTINUED | OUTPATIENT
Start: 2024-09-25 | End: 2024-09-25 | Stop reason: SURG

## 2024-09-25 RX ORDER — ATORVASTATIN CALCIUM 40 MG/1
40 TABLET, FILM COATED ORAL
Status: DISCONTINUED | OUTPATIENT
Start: 2024-09-25 | End: 2024-09-30 | Stop reason: HOSPADM

## 2024-09-25 RX ORDER — MIDAZOLAM HYDROCHLORIDE 1 MG/ML
2 INJECTION INTRAMUSCULAR; INTRAVENOUS
Status: DISCONTINUED | OUTPATIENT
Start: 2024-09-25 | End: 2024-09-26

## 2024-09-25 RX ORDER — ACETAMINOPHEN 500 MG
1000 TABLET ORAL EVERY 6 HOURS
Status: DISCONTINUED | OUTPATIENT
Start: 2024-09-25 | End: 2024-09-30 | Stop reason: HOSPADM

## 2024-09-25 RX ORDER — SODIUM CHLORIDE, SODIUM GLUCONATE, SODIUM ACETATE, POTASSIUM CHLORIDE AND MAGNESIUM CHLORIDE 526; 502; 368; 37; 30 MG/100ML; MG/100ML; MG/100ML; MG/100ML; MG/100ML
INJECTION, SOLUTION INTRAVENOUS PRN
Status: DISCONTINUED | OUTPATIENT
Start: 2024-09-25 | End: 2024-09-30 | Stop reason: HOSPADM

## 2024-09-25 RX ORDER — CLOPIDOGREL BISULFATE 75 MG/1
75 TABLET ORAL DAILY
Status: DISCONTINUED | OUTPATIENT
Start: 2024-09-26 | End: 2024-09-30 | Stop reason: HOSPADM

## 2024-09-25 RX ORDER — MILRINONE LACTATE 1 MG/ML
INJECTION, SOLUTION INTRAVENOUS PRN
Status: DISCONTINUED | OUTPATIENT
Start: 2024-09-25 | End: 2024-09-25 | Stop reason: SURG

## 2024-09-25 RX ORDER — POLYETHYLENE GLYCOL 3350 17 G/17G
1 POWDER, FOR SOLUTION ORAL DAILY
Status: DISCONTINUED | OUTPATIENT
Start: 2024-09-26 | End: 2024-09-30 | Stop reason: HOSPADM

## 2024-09-25 RX ORDER — INSULIN LISPRO 100 [IU]/ML
0-14 INJECTION, SOLUTION INTRAVENOUS; SUBCUTANEOUS
Status: DISCONTINUED | OUTPATIENT
Start: 2024-09-25 | End: 2024-09-26

## 2024-09-25 RX ORDER — SODIUM CHLORIDE, SODIUM LACTATE, POTASSIUM CHLORIDE, CALCIUM CHLORIDE 600; 310; 30; 20 MG/100ML; MG/100ML; MG/100ML; MG/100ML
INJECTION, SOLUTION INTRAVENOUS
Status: DISCONTINUED | OUTPATIENT
Start: 2024-09-25 | End: 2024-09-25 | Stop reason: SURG

## 2024-09-25 RX ORDER — TRAMADOL HYDROCHLORIDE 50 MG/1
50 TABLET ORAL EVERY 4 HOURS PRN
Status: DISCONTINUED | OUTPATIENT
Start: 2024-09-25 | End: 2024-09-30 | Stop reason: HOSPADM

## 2024-09-25 RX ORDER — BISACODYL 10 MG
10 SUPPOSITORY, RECTAL RECTAL
Status: DISCONTINUED | OUTPATIENT
Start: 2024-09-25 | End: 2024-09-30 | Stop reason: HOSPADM

## 2024-09-25 RX ADMIN — MILRINONE LACTATE 3080 MCG: 1 INJECTION, SOLUTION INTRAVENOUS at 11:23

## 2024-09-25 RX ADMIN — PROTAMINE SULFATE 400 MG: 10 INJECTION, SOLUTION INTRAVENOUS at 11:34

## 2024-09-25 RX ADMIN — SODIUM CHLORIDE: 9 INJECTION, SOLUTION INTRAVENOUS at 07:50

## 2024-09-25 RX ADMIN — Medication 1 APPLICATOR: at 20:55

## 2024-09-25 RX ADMIN — FENTANYL CITRATE 150 MCG: 50 INJECTION, SOLUTION INTRAMUSCULAR; INTRAVENOUS at 08:55

## 2024-09-25 RX ADMIN — VANCOMYCIN HYDROCHLORIDE 1 G: 1 INJECTION, POWDER, LYOPHILIZED, FOR SOLUTION INTRAVENOUS at 10:03

## 2024-09-25 RX ADMIN — VANCOMYCIN HYDROCHLORIDE 1000 MG: 5 INJECTION, POWDER, LYOPHILIZED, FOR SOLUTION INTRAVENOUS at 22:05

## 2024-09-25 RX ADMIN — OXYCODONE 5 MG: 5 TABLET ORAL at 22:31

## 2024-09-25 RX ADMIN — EPHEDRINE SULFATE 5 MG: 50 INJECTION, SOLUTION INTRAVENOUS at 09:04

## 2024-09-25 RX ADMIN — DOBUTAMINE IN DEXTROSE 5 MCG/KG/MIN: 100 INJECTION, SOLUTION INTRAVENOUS at 16:16

## 2024-09-25 RX ADMIN — SODIUM CHLORIDE, SODIUM GLUCONATE, SODIUM ACETATE, POTASSIUM CHLORIDE AND MAGNESIUM CHLORIDE: 526; 502; 368; 37; 30 INJECTION, SOLUTION INTRAVENOUS at 07:51

## 2024-09-25 RX ADMIN — SODIUM CHLORIDE 2 UNITS/HR: 9 INJECTION, SOLUTION INTRAVENOUS at 13:46

## 2024-09-25 RX ADMIN — POTASSIUM CHLORIDE 10 MEQ: 10 INJECTION, SOLUTION INTRAVENOUS at 13:46

## 2024-09-25 RX ADMIN — POTASSIUM CHLORIDE 10 MEQ: 10 INJECTION, SOLUTION INTRAVENOUS at 18:16

## 2024-09-25 RX ADMIN — Medication 1 APPLICATOR: at 13:18

## 2024-09-25 RX ADMIN — PROPOFOL 100 MG: 10 INJECTION, EMULSION INTRAVENOUS at 08:21

## 2024-09-25 RX ADMIN — VANCOMYCIN HYDROCHLORIDE 1000 MG: 1 INJECTION, POWDER, LYOPHILIZED, FOR SOLUTION INTRAVENOUS at 06:00

## 2024-09-25 RX ADMIN — SODIUM BICARBONATE 50 MEQ: 84 INJECTION INTRAVENOUS at 13:53

## 2024-09-25 RX ADMIN — FENTANYL CITRATE 250 MCG: 50 INJECTION, SOLUTION INTRAMUSCULAR; INTRAVENOUS at 08:47

## 2024-09-25 RX ADMIN — FENTANYL CITRATE 100 MCG: 50 INJECTION, SOLUTION INTRAMUSCULAR; INTRAVENOUS at 08:38

## 2024-09-25 RX ADMIN — SODIUM CHLORIDE: 9 INJECTION, SOLUTION INTRAVENOUS at 13:10

## 2024-09-25 RX ADMIN — ACETAMINOPHEN 1000 MG: 500 TABLET ORAL at 07:18

## 2024-09-25 RX ADMIN — METHADONE HYDROCHLORIDE 10 MG: 10 INJECTION, SOLUTION INTRAMUSCULAR; INTRAVENOUS; SUBCUTANEOUS at 11:54

## 2024-09-25 RX ADMIN — VECURONIUM BROMIDE 10 MG: 1 INJECTION, POWDER, LYOPHILIZED, FOR SOLUTION INTRAVENOUS at 09:40

## 2024-09-25 RX ADMIN — MIDAZOLAM HYDROCHLORIDE 1 MG: 2 INJECTION, SOLUTION INTRAMUSCULAR; INTRAVENOUS at 07:50

## 2024-09-25 RX ADMIN — EPINEPHRINE 0.02 MCG/KG/MIN: 1 INJECTION INTRAMUSCULAR; INTRAVENOUS; SUBCUTANEOUS at 08:26

## 2024-09-25 RX ADMIN — SODIUM CHLORIDE, POTASSIUM CHLORIDE, SODIUM LACTATE AND CALCIUM CHLORIDE: 600; 310; 30; 20 INJECTION, SOLUTION INTRAVENOUS at 07:50

## 2024-09-25 RX ADMIN — ROCURONIUM BROMIDE 50 MG: 10 INJECTION, SOLUTION INTRAVENOUS at 11:42

## 2024-09-25 RX ADMIN — SODIUM BICARBONATE 50 MEQ: 84 INJECTION INTRAVENOUS at 16:03

## 2024-09-25 RX ADMIN — Medication 100 MG: at 08:22

## 2024-09-25 RX ADMIN — AMINOCAPROIC ACID 6.31 G: 250 INJECTION, SOLUTION INTRAVENOUS at 10:02

## 2024-09-25 RX ADMIN — DOBUTAMINE IN DEXTROSE 10 MCG/KG/MIN: 100 INJECTION, SOLUTION INTRAVENOUS at 23:11

## 2024-09-25 RX ADMIN — FENTANYL CITRATE 250 MCG: 50 INJECTION, SOLUTION INTRAMUSCULAR; INTRAVENOUS at 10:04

## 2024-09-25 RX ADMIN — MORPHINE SULFATE 4 MG: 4 INJECTION, SOLUTION INTRAMUSCULAR; INTRAVENOUS at 18:13

## 2024-09-25 RX ADMIN — HEPARIN SODIUM 25000 UNITS: 1000 INJECTION, SOLUTION INTRAVENOUS; SUBCUTANEOUS at 09:38

## 2024-09-25 RX ADMIN — AMINOCAPROIC ACID 1 G/HR: 250 INJECTION, SOLUTION INTRAVENOUS at 10:03

## 2024-09-25 RX ADMIN — MAGNESIUM SULFATE HEPTAHYDRATE 1 G: 1 INJECTION, SOLUTION INTRAVENOUS at 13:17

## 2024-09-25 RX ADMIN — EPINEPHRINE 0.12 MCG/KG/MIN: 1 INJECTION INTRAMUSCULAR; INTRAVENOUS; SUBCUTANEOUS at 21:40

## 2024-09-25 RX ADMIN — NOREPINEPHRINE BITARTRATE 0.04 MCG/KG/MIN: 1 INJECTION, SOLUTION, CONCENTRATE INTRAVENOUS at 11:24

## 2024-09-25 RX ADMIN — ROCURONIUM BROMIDE 50 MG: 10 INJECTION, SOLUTION INTRAVENOUS at 08:24

## 2024-09-25 RX ADMIN — SODIUM CHLORIDE, SODIUM GLUCONATE, SODIUM ACETATE, POTASSIUM CHLORIDE AND MAGNESIUM CHLORIDE: 526; 502; 368; 37; 30 INJECTION, SOLUTION INTRAVENOUS at 13:29

## 2024-09-25 RX ADMIN — METOPROLOL TARTRATE 12.5 MG: 25 TABLET, FILM COATED ORAL at 07:18

## 2024-09-25 RX ADMIN — SODIUM CHLORIDE: 9 INJECTION, SOLUTION INTRAVENOUS at 13:08

## 2024-09-25 RX ADMIN — SODIUM BICARBONATE 50 MEQ: 84 INJECTION INTRAVENOUS at 15:04

## 2024-09-25 RX ADMIN — CEFAZOLIN 2 G: 1 INJECTION, POWDER, FOR SOLUTION INTRAMUSCULAR; INTRAVENOUS at 07:50

## 2024-09-25 ASSESSMENT — COGNITIVE AND FUNCTIONAL STATUS - GENERAL
DAILY ACTIVITIY SCORE: 21
TOILETING: A LITTLE
HELP NEEDED FOR BATHING: A LITTLE
MOVING TO AND FROM BED TO CHAIR: A LITTLE
MOVING FROM LYING ON BACK TO SITTING ON SIDE OF FLAT BED: A LITTLE
DRESSING REGULAR LOWER BODY CLOTHING: A LITTLE
CLIMB 3 TO 5 STEPS WITH RAILING: A LITTLE
MOBILITY SCORE: 21
SUGGESTED CMS G CODE MODIFIER MOBILITY: CJ
SUGGESTED CMS G CODE MODIFIER DAILY ACTIVITY: CJ

## 2024-09-25 ASSESSMENT — FIBROSIS 4 INDEX: FIB4 SCORE: 1.29

## 2024-09-25 ASSESSMENT — PAIN DESCRIPTION - PAIN TYPE
TYPE: ACUTE PAIN
TYPE: ACUTE PAIN;SURGICAL PAIN
TYPE: ACUTE PAIN
TYPE: ACUTE PAIN
TYPE: ACUTE PAIN;SURGICAL PAIN
TYPE: ACUTE PAIN
TYPE: ACUTE PAIN;SURGICAL PAIN

## 2024-09-25 ASSESSMENT — PULMONARY FUNCTION TESTS: FVC: 10

## 2024-09-25 ASSESSMENT — PAIN SCALES - GENERAL: PAIN_LEVEL: 0

## 2024-09-25 NOTE — PROGRESS NOTES
Brian numbers      CO:2.5  CI:1.5  SVR:2021  PVR:158  Wedge:16  CVP:7    Epi: 0.1    Updated jalen. Dobutamine @5 ordered    Also updated about ABG's being metabolically acidotic and vent settings 160%/8/50% unsure if 6hr extubation flora will be obtained. Continuing to wean vent as ABGs and pt allows.

## 2024-09-25 NOTE — PROGRESS NOTES
Initial swan numbers:    CO:3.0  CI:1.8  SVR:1580  PVR:186  Wedge:16  CVP:8    Milrinone bolus given in OR  Epi:0.04  Levo:0.02

## 2024-09-25 NOTE — DIETARY
Nutrition Services: Cardiac Education Consult   Day 3 of admit.  Gold Bustamante is a 63 y.o. male with admitting DX of NSTEMI (non-ST elevated myocardial infarction)     RD received consult for heart healthy diet education. RD included nutrition recommendations and tips in dietary discharge instructions that align with pt's current dx. Outpatient resources included to encourage follow-up with [facility] for continued support after discharge.     No other education needs identified at this time. Please consult RD as needed for supplemental education or at request of pt.

## 2024-09-25 NOTE — OP REPORT
Operative Report    PreOp Diagnosis: Non-STEMI, ischemic cardiomyopathy, acute and chronic congestive heart failure, multivessel coronary artery disease, tobacco abuse,    PostOp Diagnosis: Same as above        Procedure(s):  ULTRASOUND GUIDED INSERTION OF LEFT FEMORAL ARTERIAL LINE  CABG X 4  WITH SKELENTONIZED SEQUEIRA TO LAD  RSVG TO DIAG 2  RSVG TO RAMUS  RSVG TO RPDA  WITH ENDOSCOPIC VEIN PROCUREMENT OF RIGHT GREATER SAPHENOUS VEIN - Wound Class: Clean with Drain  ECHOCARDIOGRAM, TRANSESOPHAGEAL, INTRAOPERATIVE - Wound Class: Clean Contaminated    Surgeon(s):  Pierre Sheffield D.O.    Anesthesiologist/Type of Anesthesia:  Anesthesiologist: Koko Pettit M.D.  Anesthesia Technician: Rody Canada  Perfusionist: Precious Martin/General    Surgical Staff:  Assistant: ARIN Mcneill  Circulator: Angle Chen R.N.; America Pope R.N.; Lukas D. Gansert, RKASH  Scrub Person: Gayle Guevara; Jose Francisco Penaloza RKASH    Specimens removed if any:  * No specimens in log *    Estimated Blood Loss: Cardiopulmonary bypass    Findings:     Preoperative echo showed ejection fraction of 10 to 15% with significant global hypokinesia, but particular akinesia in the anteroseptal wall.  Postoperative echo showed improvement in ejection fraction of 25% with improved wall motion throughout, on moderate inotropic support.    Mammary artery was on the small side roughly 1-1/2 mm.  Had adequate flow for bypass.    Saphenous vein was 2 to 3 mm size and good quality conduit.    All targets had proximal calcification, wall thickening, but were reasonably sized at roughly 1.75 cm on all 4.    All grafts had brisk flow noted distally epicardially.  Flow down the vein graft was checked with cardioplegia distilled on the graft.  Flow in the PDA was 50 mL a minute at 150 mmHg.  Ramus flow was 70 mL a minute diagonal to flow 40 mL/min also at 150 mmHg of pressure.    Patient did not require defibrillation to resume a normal sinus rhythm, and   from bypass without issue.    Complications: None immediate    Patient condition: Guarded to ICU    Chest tubes: Mediastinal: 32 [place] Islander x2    Endoscopic vein harvest: Right greater saphenous vein.    Pacing wires: RV x 2    Pericardium: Open    Cross-clamp time: 72 minutes    Pump time: 90 minutes    Cardioplegia: Cold blood antegrade [place] cardioplegia given.  Initial induction with 1000 mL.  300 mL of warm blood given as antegrade hotshot    Vent: Aortic root      Procedure:    After informed consent was obtained, the patient was brought to the operating room.  They were placed in supine position on the operating table.  General anesthesia was induced via endotracheal tube.  Lines, arterial line, Warren were placed by the nursing and anesthesia teams.  They received pre-incision antibiotics and beta-blockade.  The patient was prepped in a sterile fashion from their chin to their feet.  Drapes were placed in a sterile fashion.  The procedure was begun with a timeout.    The intraoperative MELISSA was performed, and he was found to have a very decreased ejection fraction with poor wall motion.  This was this concordant with his preoperative TTE.  Because of that, I elected to place a femoral arterial line, under ultrasound guidance, into the left femoral artery.  I did this to prepare for possible placement of balloon pump for further mechanical support.  It was secured to the patient with silk sutures.    I made a sternotomy incision with a 10 blade scalpel.  Concurrently to this, MARIO Gomez harvested the saphenous vein in the usual fashion.  Once removed from the leg, the leg was closed in standard fashion.  The vein was then prepped on the back table.  They then joined me at the head of the table and assisted in all aspects of the case.    I deepened my sternal incision with left cautery to the sternum.  The sternum was divided in the midline using the sternal saw.  Hemostasis of the sternal  edges was obtained using bone putty and electrocautery.  The mammary retractor was brought up at that point and the left hemisternum was elevated.  Using the electrocautery with occasional clips for side branches, the mammary was dissected down from its takeoff at the subclavian down to its bifurcation into the muscular phrenic and inferior epigastric, in a skeletonized fashion.    Once the mammary was completely dissected out, systemic heparinization was performed, using 400 units/kg of heparin, to achieve an ACT greater than 480 seconds. The mammary was ligated distally and divided.  There was excellent flow noted.  I placed a small clip at the distal end of the mammary, treated it with papaverine, and placed it back in the left chest for safe keeping.  The distal stump was secured.  The mammary retractor was removed and sternal retractor was placed in the chest open.    With the chest open, I then dissected out the innominate vein and the pericardium.  I then opened the pericardium and inverted T fashion.  I created pericardial well, suspending the pericardial edges from the skin edges using interrupted 2-0 Ethibond sutures.  Inspection of the heart and the aorta revealed no obvious pathology.  Palpation of the aorta revealed no calcifications precluding cannulation or cross-clamp.  I cannulated the aorta using a direct Seldinger's technique and MELISSA guidance.    Next, I placed the venous cannula into the IVC via the right atrium.  Again this was confirmed with MELISSA.  Next, I then placed a pursestring on the mid ascending aorta, through which I placed my antegrade needle.      With my cannulas in place, I then inspected the vein.  It was of reasonable quality.  The distal end was spatulated and it was marked to help with orienting the graft.  Cardiopulmonary bypass was then initiated.    Once on full flow, I then retracted the heart to inspect my targets.  They are noted above.  These were each marked with the  [place] blade.  I then dissected out the IVC and brought the heart strep through the oblique sinus followed by running it through the transverse sinus.  The heart strep was used throughout the case to help position the heart.  Finally, I dissected the pulmonary artery away from the distal portion of the aorta to facilitate cross-clamp placement and complete isolation of the heart.  I then placed a cross-clamp and the heart was arrested.  Ice slush was placed on the heart to assist with cooling.    After I had arrest, the heart was repositioned to expose the PDA.  I dissected this out with the Oneida blade.  I then opened it.  An end vein to side artery anastomosis was created with a running 7-0 Prolene suture.  The vein graft was connected to the cardioplegia system, and cardioplegia was given as I tied down the anastomosis.  There was excellent flow noted and good hemostasis.  Hand-injection of vein solution was performed to distend the vein graft as I brought it around the right atrium, to measure it to the aorta.  I then divided it proximally.  The proximal portion was then spatulated as well.  Cardioplegia was given down the aortic root, and I created an aortotomy using 11 blade scalpel and a 4 mm aortic punch.  The proximal anastomosis was then created using a running 6-0 Prolene suture in an end vein to side aorta fashion.  It was also marked with a coronary marker.    The heart was then repositioned to expose the ramus intermedius branch.  There was no bypassable targets for the ongoing circumflex artery on the lateral wall.  Because of that, the ramus and diagonal were done in order to make sure there is good supply not only to the lateral wall but also to support the LAD territory as well.  I dissected this out with the Oneida blade, and then opened it. An end vein to side artery anastomosis was created with a running 7-0 Prolene suture.  Upon completion, the vein graft was connected to the cardioplegia  circuit and cardioplegia given as I tied down the anastomosis.  Excellent flow was noted and there was good hemostasis.  I then used vein solution to distend the vein and measured out to the aorta.  I divided it proximally and spatulated it for creation of an another anastomosis.  The aorta was once again distended with cardioplegia, and I created an aortotomy with 11 blade scalpel and 4 mm punch.  An end vein to side aorta anastomosis was created with a running 6-0 Prolene suture.  Upon completion cardioplegia was given in both proximal anastomosis were found to be hemostatic.  The proximal was also marked with a coronary marker.      The heart was repositioned to expose the diagonal branch.  It was opened in the usual fashion, and a vein graft anastomosed to the artery with a running 7-0 Prolene.  It was also tested with hand-injection and cardioplegia instillation.  It was measured to the aorta.  Unfortunately the aorta was calcified and the areas that would be viable for proximal anastomosis.  Because of that, I opened up the sarmiento of the ramus vein graft in a longitudinal fashion.  I then anastomose the diagonal graft to it in an end vein to side vein fashion longitudinally.  Testing with cardioplegia given down the aortic root showed good flow down both branches after the Y anastomosis.  Rewarming of the patient was begun    The heart was repositioned again to expose the LAD.  I dissected this out with a [place] blade and opened it with 11 blade scalpel.  I extended the arteriotomy with Bernardo scissors.  I then created a keyhole defect in the pericardium through which I brought my mammary pedicle.  I then sized it to the site of the anastomosis and divided mammary pedicle, discarding the distal portion.  I then fashioned the distal end of the mammary.  An end LIMA to side LAD anastomosis was created with a running 7-0 Prolene suture.  Upon completion, the pedicle was opened and flow was noted to run distal.  It  was also noted to be hemostatic.  Bulldog clamp was reapplied and I tacked the pedicle to the epicardium with interrupted 6-0 Prolene sutures.    Patient was de-aired, flows dropped, and cross-clamp removed.  As the heart reperfused the placement of right ventricular pacing leads and my mediastinal chest tubes.  They were brought out through the epigastrium in the usual fashion.  Surgical sites were checked and found to be hemostatic.  The patient was then  from bypass in the usual fashion.  Venous cannula and aortic root vent were removed.  Protamine was administered to reverse systemic heparinization.  As the protamine was administered, the patient's blood volume in the pump was administered back to the aortic cannula.  Once that was given back, that cannula was removed.  Sites were oversewn as needed.  Hemostasis was verified to be good.  Proceeded with closure of the sternum using interrupted #5 sternal wires.  The wound was then irrigated and closed in layers using absorbable sutures.  The patient tolerated the procedure well, all instrument counts were correct x2, and he was taken to the ICU in guarded condition.          9/25/2024 12:15 PM Pierre Sheffield D.O.

## 2024-09-25 NOTE — ANESTHESIA PROCEDURE NOTES
Airway    Date/Time: 9/25/2024 8:22 AM    Performed by: Koko Pettit M.D.  Authorized by: Koko Pettit M.D.    Location:  OR  Urgency:  Elective  Indications for Airway Management:  Anesthesia      Spontaneous Ventilation: absent    Sedation Level:  Deep  Preoxygenated: Yes    Patient Position:  Sniffing  Final Airway Type:  Endotracheal airway  Final Endotracheal Airway:  ETT  Cuffed: Yes    Technique Used for Successful ETT Placement:  Direct laryngoscopy    Insertion Site:  Oral  Blade Type:  Mancilla  Laryngoscope Blade/Videolaryngoscope Blade Size:  2  ETT Size (mm):  8.0  Measured from:  Teeth  ETT to Teeth (cm):  23  Placement Verified by: auscultation and capnometry    Cormack-Lehane Classification:  Grade I - full view of glottis  Number of Attempts at Approach:  1

## 2024-09-25 NOTE — PROGRESS NOTES
4 Eyes Skin Assessment Completed by MARICEL Ramírez and MARICEL Bautista.    Head WDL  Ears WDL  Nose WDL  Mouth WDL  Neck WDL  Breast/Chest WDL  Shoulder Blades Redness and Blanching  Spine WDL  (R) Arm/Elbow/Hand Redness, Blanching, and Bruising  (L) Arm/Elbow/Hand Redness, Blanching, and Bruising  Abdomen WDL  Groin WDL  Scrotum/Coccyx/Buttocks Redness, Discoloration, and Scar on buttocks   (R) Leg Swelling  (L) Leg Swelling  (R) Heel/Foot/Toe Redness and Blanching  (L) Heel/Foot/Toe Redness and Blanching          Devices In Places Tele Box, Blood Pressure Cuff, and Pulse Ox      Interventions In Place Heel Mepilex, Chair Waffle, Elbow Mepilex, Low Air Loss Mattress, and Heels Loaded W/Pillows    Possible Skin Injury No    Pictures Uploaded Into Epic N/A  Wound Consult Placed N/A  RN Wound Prevention Protocol Ordered No

## 2024-09-25 NOTE — PROGRESS NOTES
"This RN walked into room to start preop education and both pt and pts sister in tears about upcoming procedure. Pts sister became visibly upset and was asking for \"proof that he needs this surgery\" and \"I need to see the imagine showing the occlusions\". Pts sister also asking for surgeon to come see her. I explained I may not be do all that for her but offered to provide more education. Both state they are \"just very nervous about this whole thing\". T7 RN kindly came down to help me reinforce his previous, detailed explanations about his stay. Both of us answered the patients and his sisters questions in as best detail as possible. We also provided reassurance in explaining that we are very confident in our medical teams ability to do this procure and do what's best for pt. Both state understanding and are very thankful. We also stated that we understand this is a huge shock for them and we validated their feelings and addressed their concerns. After about an hour of conversation with the patient and his sister both reported feeling much better. Both thankful for education provided.     "

## 2024-09-25 NOTE — PROGRESS NOTES
Monitor Summary:   Rhythm: SB/SR 1st deg HB  Rate: 53-84  Measurement: .23/.09/.41  Ectopy: pvc (f) bigem

## 2024-09-25 NOTE — CARE PLAN
Problem: Knowledge Deficit - Standard  Goal: Patient and family/care givers will demonstrate understanding of plan of care, disease process/condition, diagnostic tests and medications  Outcome: Progressing  Note: Pt and his sister need lots and frequent education about procedure. After a few long talks and demonstrations both seem to be beginning to better understand      Problem: Pain - Standard  Goal: Alleviation of pain or a reduction in pain to the patient’s comfort goal  Outcome: Progressing     Problem: Pre Op  Goal: Optimal preparation for CABG/Heart Valve surgery  Outcome: Progressing   The patient is Stable - Low risk of patient condition declining or worsening    Shift Goals  Clinical Goals: heparin gtt, trend vitals, telemetry  Patient Goals: stay informed  Family Goals: unable to assess    Progress made toward(s) clinical / shift goals:     Patient is not progressing towards the following goals:

## 2024-09-25 NOTE — DOCUMENTATION QUERY
[facility]                                                                       Query Response Note      PATIENT:               MICHAEL PACE  ACCT #:                  [id number]  MRN:                     [id number]  :                      1960  ADMIT DATE:       2024 2:52 PM  DISCH DATE:          RESPONDING  PROVIDER #:        [id number]           QUERY TEXT:    Can the acuity of the heart failure be further specified based on the clinical indicators and required treatments?    The patient's Clinical Indicators include:    -NT-proBNP: 2950  -ECHO:  EF of 25%;  Grade 3 diastolic dysfunction with markedly elevated left ventricular filling pressures.  -CTA chest:  small bilateral pleural effusions       -CXR: Pulmonary edema and/or infiltrates ; Small bilateral pleural effusions  -HM note: no edema, Clinically appears euvolemic    Risk Factors: NSTEMI, HTN, diabetes, smoker  Treatments: Start losartan and metoprolol, telemetry monitoring    Contact me with any questions.    Thank you for your time and attention,  Graciela Morillo RN, [email or web address]  Connect via email, Voalte or messenger.    Note: If you agree with a listed diagnosis, please remember to include it in your concurrent daily documentation and onto the Discharge Summary.  Options provided:   -- Exacerbation or decompensation   -- Chronic   -- Acute on Chronic   -- Other explanation, (please specify other explanation)   -- Unable to determine      Query created by: Graciela Morillo on 2024 12:18 PM    RESPONSE TEXT:    Acute on Chronic          Electronically signed by:  COCO NICHOLSON MD 2024 2:49 PM

## 2024-09-25 NOTE — ANESTHESIA POSTPROCEDURE EVALUATION
Patient: Gold Bustamante    Procedure Summary       Date: 09/25/24 Room / Location: [facility] OR 03 / SURGERY [facility]    Anesthesia Start: 0750 Anesthesia Stop: 1252    Procedures:       CABG X 4, WITH ENDOSCOPIC VEIN PROCUREMENT (Chest)      ECHOCARDIOGRAM, TRANSESOPHAGEAL, INTRAOPERATIVE (Esophagus) Diagnosis: (severe symptomatic multivessel coronary artery disease)    Surgeons: Pierre Sheffield D.O. Responsible Provider: Koko Pettit M.D.    Anesthesia Type: general ASA Status: 4            Final Anesthesia Type: general  Last vitals  BP   Blood Pressure: (!) 143/83    Temp   36 °C (96.8 °F)    Pulse   72   Resp   16    SpO2   97 %      Anesthesia Post Evaluation    Patient location during evaluation: ICU  Patient participation: complete - patient participated  Level of consciousness: awake and alert  Pain score: 0    Airway patency: patent  Anesthetic complications: no  Cardiovascular status: adequate and hemodynamically stable  Respiratory status: acceptable  Hydration status: acceptable    PONV: none          No notable events documented.     Nurse Pain Score: 0 (NPRS)

## 2024-09-25 NOTE — ANESTHESIA TIME REPORT
Anesthesia Start and Stop Event Times       Date Time Event    9/25/2024 0725 Ready for Procedure     0750 Anesthesia Start     1252 Anesthesia Stop          Responsible Staff  09/25/24      Name Role Begin End    Rody Canada Anesth Tech 0750 1252    Koko Pettit M.D. Anesth 0750 1252          Overtime Reason:  no overtime (within assigned shift)    Comments:

## 2024-09-25 NOTE — ANESTHESIA PROCEDURE NOTES
Central Venous Line    Performed by: Koko Pettit M.D.  Authorized by: Koko Pettit M.D.    Start Time:  9/25/2024 8:24 AM      provider hand hygiene performed prior to central venous catheter insertion, all 5 sterile barriers used (gloves, gown, cap, mask, large sterile drape) during central venous catheter insertion and skin prep agent completely dried prior to procedure    Patient Position:  Trendelenburg  Site:  Internal jugular  Prep:  Chlorhexidine  Catheter Type:  Introducer  Number of Lumens:  Double lumen  target vein identified, needle advanced into vein and blood aspirated and guidewire advanced into vein    Seldinger Technique?: Yes    Ultrasound-Guided: ultrasound-guided  Image captured, interpreted and electronically stored.  Sterile Gel and Probe Cover Used for Ultrasound?: Yes    Intravenous Verification: verified by ultrasound, venous blood return and chest x-ray pending    all ports aspirated, all ports flushed easily, guidewire was removed intact, biopatch was applied, line was sutured in place and dressing was applied    Events: patient tolerated procedure well with no complications    PA Catheter Placed?: Yes    PA Catheter Type:  Non-oximetric  PA Catheter Size:  7  Laterality:  Right  Site:  Through introducer  Placement Confirmation: MELISSA, x-ray and waveform

## 2024-09-25 NOTE — PROGRESS NOTES
Pt arrived s/p CABGx4 . Report received from Wilver anesthesiologist. Pacer tested rate 50 Ma 10 Mv 2. Chest tubes placed to suction. No Air leak noted. Chest x ray reviewed by intensivist. Elver manning applied for temp 35.2 C. Labs sent.  Brian @55 and locked. Wedges appropriately.

## 2024-09-25 NOTE — ASSESSMENT & PLAN NOTE
9/25-CABG x 4, LIMA->LAD, RSVG-> Diag2, ramus and RPDA    CBP time 90 mins  Xclamp time72 mins  Epinephrine/norepinephrine for low cardiac output/hypotension as needed-actively titrating norepinephrine  Given depressed LV function likely will need some inotropy in the post-op period, given milrinone coming off CPB  Chest tubes per CVS  Pacer wires in back up mode, functioning, no pacing required intra-op  Post-op MELISSA LVEF 25%, RV function reported as normal     Coronary artery disease involving native coronary artery of native heart without angina pectoris- (present on admission)      Admitted with NSTEMI   LHC done at [facility] 9/20 with multivessel disease  DAPT per CTS  Statin  BB/ACE when hemodynamics allow  Cardiac rehab when clinically appropriate      Encounter for Weaning from Mechanical Ventilation  Extubated     Breathing unlabored  ISS/PEP  Mobility  Wean 02 as able

## 2024-09-25 NOTE — PROGRESS NOTES
Pre op labs and imaging reviewed. No interval changes to H&P. Proceed with CABG x3-4, EVH, and MELISSA.

## 2024-09-25 NOTE — PROGRESS NOTES
[facility]    CO:2.7  CI:1.6  SVR:1988  PVR:60  Wedge:16  CVP:6    Levo:0.03  Epi: 0.06    Updated Angie lópez of [facility] l mun[facility]. Plan to come off levo and up on epi

## 2024-09-25 NOTE — CARE PLAN
The patient is Watcher - Medium risk of patient condition declining or worsening    Shift Goals  Clinical Goals: heparin gtt, CHG x2, labs, 2view CXR  Patient Goals: updates and comfort  Family Goals: updates    Progress made toward(s) clinical / shift goals:    Problem: Pain Management  Goal: Pain level will decrease to patient's comfort goal  Outcome: Progressing  Intervention: Follow pain managment plan developed in collaboration with patient and Interdisciplinary Team  Note: Medicated per MAR as needed     Problem: Day of surgery post CABG/Heart valve replacement  Goal: Stabilization in immediate post op period  Outcome: Progressing  Intervention: VS q 15 min x 4 hours, then q 1 hour. Include temperature immediately upon arrival. Check CO/CI q 2-4 hours and PRN  Note: Vitals q15min while on vasopressors. CO/CI q2h and PRN  Intervention: If radial artery used, elevate arm, no BP checks or needle sticks from affected arm, monitor ulnar pulse and capillary refill  Note: N/A  Intervention: First post op hour labs and EKG per order  Note: Labs sent and EKG obtained upon arrival  Intervention: Serum K q 6 hours x 24 hours.  ABG and CBC prn.  Note: K scale followed. ABGs obtained for vent weaning  Intervention: Initiate post cardiac insulin infusion protocol orders for FSBS greater than 140 and check frequency per protocol  Note: Started per protocol  Intervention: FSBS frequency as per Cardiac Surgery Insulin Drip Protocol  Note: Q1h per protocol  Intervention: For patients on Beta Blockers: verify dose given prior to surgery or within 6 hours after arrival to the unit  Note: Given in pre op  Intervention: Chest tube to 20 cm suction, record CT drainage with VS, and check for air leak  Note: Total output:122ml    Intervention: For CT drainage >300 mL in first hour post op and/or 150 mL in subsequent hours: Stat platelets, PT, INR, TEG, iSTAT, and H&H per order  Note: N/A  Intervention: Titrate and wean off vasoactive  drips per patient's condition and per MD order while maintaining SBP  mmHg per MD order  Note: Weaned off levo onto epi and added dobutamine for low CI continued to titrate to CI >2  Intervention: VAP protocol in place  Note: Oral care completed  Intervention: Wean from Vent per protocol (see protocol), extubation goal within 6 hours post op  Note: Weaned as tolerated. Remains on multiple vasopressors    Intervention: IS q 1 hour while awake post extubation  Note: Pt remained Intubated  Intervention: Bedrest until extubated and groin lines out  Note: Plan to edge of bed post extubation  Intervention: Dangle within 4 hours post extubation  Note: Plan to get edge of bed after extubation  Intervention: Maintain all original surgical dressings per provider orders and specifications  Note: Island dressing in place. Acewrap on R EVH  Intervention: Clear liquids post extubation, order carbohydrate free (post cardiac surgery) diet, advance as tolerated  Note: Pt remains intubated. Will attempt swallow eval after extubation  Intervention: Discontinue Gordon kamilah and arterial line 12-18 hours post op if hemodynamically stable and off vasoactive drips  Note: Will d/c swan when weaned off pressors  Intervention: A-Fib and DVT prophylaxis per MD order or contraindications documented (refer to DVT/VTE problem on Care Plan)  Note: SCD's in place       Patient is not progressing towards the following goals:

## 2024-09-25 NOTE — CARE PLAN
The patient is Stable - Low risk of patient condition declining or worsening    Shift Goals  Clinical Goals: heparin gtt, CHG x2, labs, 2view CXR  Patient Goals: updates and comfort  Family Goals: updates    Progress made toward(s) clinical / shift goals:    Problem: Knowledge Deficit - Standard  Goal: Patient and family/care givers will demonstrate understanding of plan of care, disease process/condition, diagnostic tests and medications  Outcome: Progressing  Pt educated on POC and disease processes. Pt verbalized understanding of medication regimen and interventions.     Problem: Pre Op  Goal: Optimal preparation for CABG/Heart Valve surgery  Outcome: Progressing  Intervention: Consents obtained for Surgery, Anesthesia and Transfusion/Bloodless Program Participant  Note: Consents obtained  Intervention: Pre op labs per MD order: CBC, CMP, INR, PTT, COD and UA if not done in past 72 hours.  HbA1C if diabetic.  Note: Labs completed  Intervention: Baseline assessment documented to include IS volume, weight, bilateral BP and peripheral pulses.  Note: Basline IS, wt, bp and pulses charted  Intervention: NPO at midnight except cardiac medications and PowerAde drink 2 hours prior to surgery (NO ASA, Coumadin, Plavix, Lovenox, or ACE/ARB)  Note: Pt NPO at 0000. Poweraide given 2 hrs before  Intervention: Shower with Chlorhexidine x 2 (12 hours apart), night before surgery and morning of surgery  Note: CHG x2,   Intervention: Prep with clippers - chin to toes bedline  Note: Hair clipped.  Intervention: Remove dentures, valuables, jewelry, piercings, contacts, dentures and hearing aids  Note: No valuables on pt  Intervention: CABG patients - Determine if patient is taking beta blockers  Note: Pt taking beta blocker

## 2024-09-25 NOTE — ANESTHESIA PROCEDURE NOTES
MELISSA    Date/Time: 9/25/2024 8:24 AM    Performed by: Koko Pettit M.D.  Authorized by: Koko Pettit M.D.    Start Time:9/25/2024 8:24 AM  Preanesthetic Checklist: patient identified, IV checked, site marked, risks and benefits discussed, surgical consent, monitors and equipment checked, pre-op evaluation and timeout performed    Indication for MELISSA: diagnostic   Patient Location: OR  Intubated: Yes  Bite Block: Yes  Heart Visualized: Yes  Insertion: atraumatic    **See FULL MELISSA report in patient's chart via [facility]**

## 2024-09-25 NOTE — CONSULTS
Critical Care Consultation    Date of consult: 9/25/2024    Referring Physician  CTS    Reason for Consultation  Periop management     History of Presenting Illness  63 y.o. male who presented 9/22/2024 with past medical history hypertension, diabetes, and coronary artery disease who [place] [facility] [place].  Patient presented to an outside facility with chest pain, underwent CT chest without pulmonary embolism, had bilateral small effusions, and pulmonary edema.  He underwent an infectious workup had a negative RVP and then was transferred to a neighboring hospital here in [place] for further care.  At [facility] he underwent a cardiac evaluation and noted to have markedly elevated troponin with a TTE showing reduced ejection fraction (25%) and LVH and some hypokinesis along the septum, he was transferred here for cardiology evaluation and treated with antithrombotics and heparin.  At admission he underwent left heart catheterization showed multivessel coronary disease and was referred to cardiothoracic surgery for surgical revascularization.    Today was taken to the OR with Dr. Mark where he underwent 4v CABG. the operative case was relatively unremarkable.  The patient was hemodynamically stable throughout the case he received 650 of Cell Saver, 3 L of crystalloid, no blood transfusion, had 800 cc of urine output, and whereas his initial preop TTE showed a very low ejection fraction reportedly at 15%, he improved to an EF of around 25% and with improved systolic function.  Patient did require a milrinone bolus coming off of bypass per anesthesia.    He arrives to ICU on 0.04 of epinephrine, 0.02 of norepinephrine, and his chest x-ray shows bilateral pulmonary edema, initial blood gas 7.3 7/38/326 on 100% FiO2.    Code Status  Full Code    Review of Systems  Review of Systems   Unable to perform ROS: Intubated       Past Medical History   has no past medical history on  file.    Surgical History   has no past surgical history on file.    Family History  family history is not on file.    Social History       Medications  Home Medications       Reviewed by Haseeb Piper R.N. (Registered Nurse) on 09/25/24 at 0106  Med List Status: Complete     Medication Last Dose Status        Patient Andrew Taking any Medications                         Audit from Redirected Encounters    **Home medications have not yet been reviewed for this encounter**       Current Facility-Administered Medications   Medication Dose Route Frequency Provider Last Rate Last Admin    insulin regular (HumuLIN R/NovoLIN R) 1 unit/mL syringe (IV ONLY) 0-14 Units  0-14 Units Intravenous Once Pierre Sheffield D.O.        insulin lispro (HumaLOG,AdmeLOG) subcutaneous injection  0-14 Units Subcutaneous TID AC Pierre Sheffield D.O.        insulin regular (HumuLIN R/NovoLIN R) 100 Units in  mL Infusion  0-29 Units/hr Intravenous Continuous ROMULO Davis.OLinda 2.5 mL/hr at 09/25/24 1450 2.5 Units/hr at 09/25/24 1450    dextrose 50% (D50W) injection 12.5-25 g  12.5-25 g Intravenous PRN Pierre Sheffield D.O.        MD Alert...Pharmacy to initiate transition from insulin infusion to subcutaneous insulin for cardiothoracic surgery 1 Each  1 Each Other Continuous ROMMEL DavisOLinda        norepinephrine (Levophed) 8 mg in 250 mL NS infusion (premix)  0-1 mcg/kg/min (Ideal) Intravenous Continuous ROMULO Davis.OLinda   Stopped at 09/25/24 1512    EPINEPHrine (Adrenalin) infusion 4 mg/250 mL (premix)  0-0.5 mcg/kg/min (Ideal) Intravenous Continuous ROMULO Davis.O. 23.1 mL/hr at 09/25/24 1456 0.1 mcg/kg/min at 09/25/24 1456    Respiratory Therapy Consult   Nebulization Continuous RT VEGA McneillRKASH        NS infusion   Intravenous Continuous VEGA McneillRKASH   Stopped at 09/25/24 1346    NS infusion   Intravenous Continuous Angie L Rey, A.P.R.N. 30 mL/hr  at 09/25/24 1310 New Bag at 09/25/24 1310    electrolyte-A (Plasmalyte-A) infusion   Intravenous PRN JAYNA McneillPLindaRLindaN. 999 mL/hr at 09/25/24 1329 New Bag at 09/25/24 1329    [START ON 9/26/2024] enoxaparin (Lovenox) inj 40 mg  40 mg Subcutaneous DAILY AT 1800 JAYNA McneillPLindaRKASH        Nozin nasal  swab  1 Applicator Each Nostril BID VEGA McneillR.N.   1 Applicator at 09/25/24 1318    calcium CHLORIDE 10 % 1,000 mg in dextrose 5% 100 mL IVPB  1,000 mg Intravenous Once PRN JAYNA McneillPLindaRCARLOS.        [START ON 9/26/2024] calcium CHLORIDE 10 % 1,000 mg in dextrose 5% 100 mL IVPB  1,000 mg Intravenous Once PRN JAYNA McneillPLindaRKASH        magnesium sulfate in D5W IVPB premix 1 g  1 g Intravenous DAILY JAYNA McneillPLindaR.N. 100 mL/hr at 09/25/24 1317 1 g at 09/25/24 1317    K+ Scale: Goal of 4.5  1 Each Intravenous Q6HRS JAYNA McneillPLindaR.N.   1 Each at 09/25/24 1245    clevidipine (Cleviprex) IV emulsion  0-21 mg/hr Intravenous Continuous JAYNA McneillPLindaRLindaN.        nitroglycerin 50 mg in D5W 250 ml infusion  0-100 mcg/min Intravenous Continuous JAYNA McneillPLindaRKASH        Pharmacy Consult Request ...Pain Management Review 1 Each  1 Each Other PHARMACY TO DOSE VEGA McneillRCARLOS.        acetaminophen (Tylenol) tablet 1,000 mg  1,000 mg Oral Q6HRS JAYNA McneillP.R.GARLAND.        Followed by    [START ON 10/5/2024] acetaminophen (Tylenol) tablet 1,000 mg  1,000 mg Oral Q6HRS PRN JAYNA McneillP.R.N.        oxyCODONE immediate-release (Roxicodone) tablet 5 mg  5 mg Oral Q3HRS PRN JAYNA McneillPLindaRLindaN.        Or    oxyCODONE immediate release (Roxicodone) tablet 10 mg  10 mg Oral Q3HRS PRN JAYNA McneillP.R.N.        Or    fentaNYL (Sublimaze) injection 50 mcg  50 mcg Intravenous Q3HRS PRN JAYNA McneillPLindaRLindaN.        traMADol (Ultram) 50 MG tablet 50 mg  50 mg Oral Q4HRS PRN JAYNA McneillP.RCARLOS.        midazolam  (Versed) injection 2 mg  2 mg Intravenous Q HOUR PRN TISHA Mcneill.P.R.N.        dexmedetomidine (Precedex) 400 mcg/100mL infusion  0-1.5 mcg/kg/hr (Ideal) Intravenous Continuous TISHA Mcneill.P.R.N. 7.7 mL/hr at 09/25/24 1300 0.5 mcg/kg/hr at 09/25/24 1300    sodium bicarbonate 8.4 % injection 50 mEq  50 mEq Intravenous Q HOUR PRN TISHA Mcneill.P.R.N.   50 mEq at 09/25/24 1504    morphine 4 MG/ML injection 4 mg  4 mg Intravenous Q HOUR PRN Angie Rey A.P.R.N.        ondansetron (Zofran) syringe/vial injection 8 mg  8 mg Intravenous Q6HRS PRN Angie Rey A.P.R.N.        Or    prochlorperazine (Compazine) injection 10 mg  10 mg Intravenous Q6HRS PRN TISHA Mcneill.P.R.N.        senna-docusate (Pericolace Or Senokot S) 8.6-50 MG per tablet 2 Tablet  2 Tablet Oral BID TISHA Mcneill.P.R.N.        And    [START ON 9/26/2024] polyethylene glycol/lytes (Miralax) Packet 1 Packet  1 Packet Oral DAILY TISHA Mcneill.P.R.N.        And    [START ON 9/27/2024] magnesium hydroxide (Milk Of Magnesia) suspension 30 mL  30 mL Oral DAILY TISHA Mcneill.P.R.N.        And    bisacodyl (Dulcolax) suppository 10 mg  10 mg Rectal QDAY PRN Angie Rey A.P.R.N.        [START ON 9/26/2024] omeprazole (PriLOSEC) capsule 20 mg  20 mg Oral DAILY TISHA Mcneill.P.R.N.        mag hydrox-al hydrox-simeth (Maalox Plus Es Or Mylanta Ds) suspension 30 mL  30 mL Oral Q4HRS PRN TISHA Mcneill.P.R.N.        diphenhydrAMINE (Benadryl) tablet/capsule 25 mg  25 mg Oral HS PRN - MR X 1 JAYNA McneillP.RLindaN.        [START ON 9/26/2024] carvedilol (Coreg) tablet 3.125 mg  3.125 mg Oral BID WITH MEALS JAYNA McneillP.RLindaN.        [START ON 9/26/2024] aspirin EC tablet 81 mg  81 mg Oral DAILY JAYNA McneillP.RLindaN.        [START ON 9/26/2024] clopidogrel (Plavix) tablet 75 mg  75 mg Oral DAILY JAYNA McneillPLindaRKASH        atorvastatin (Lipitor) tablet 40 mg  40 mg Oral QHS  ARIN Mcneill        vancomycin (Vancocin) 1,000 mg in  mL IVPB  1,000 mg Intravenous Once ARIN Mcneill        nitroglycerin (Nitrostat) tablet 0.4 mg  0.4 mg Sublingual Q5 MIN PRN Jose Montague M.D.           Allergies  No Known Allergies    Vital Signs last 24 hours  Temp:  [35.2 °C (95.4 °F)-36.6 °C (97.8 °F)] 35.2 °C (95.4 °F)  Pulse:  [60-84] 78  Resp:  [10-39] 29  BP: ()/(51-98) 92/55  SpO2:  [92 %-99 %] 99 %    Physical Exam  Physical Exam  Constitutional:       General: He is not in acute distress.     Appearance: He is not ill-appearing.   HENT:      Head: Normocephalic.      Mouth/Throat:      Mouth: Mucous membranes are moist.   Eyes:      Pupils: Pupils are equal, round, and reactive to light.   Cardiovascular:      Rate and Rhythm: Regular rhythm.   Pulmonary:      Effort: Pulmonary effort is normal.      Breath sounds: No rales.   Abdominal:      Palpations: Abdomen is soft.   Musculoskeletal:      Cervical back: Neck supple.      Right lower leg: No edema.      Left lower leg: No edema.   Skin:     General: Skin is warm.      Capillary Refill: Capillary refill takes less than 2 seconds.   Neurological:      General: No focal deficit present.         Fluids    Intake/Output Summary (Last 24 hours) at 9/25/2024 1523  Last data filed at 9/25/2024 1400  Gross per 24 hour   Intake 7784.64 ml   Output 6392 ml   Net 1392.64 ml       Laboratory  Recent Results (from the past 48 hour(s))   CBC WITHOUT DIFFERENTIAL    Collection Time: 09/24/24  3:52 AM   Result Value Ref Range    WBC 10.7 4.8 - 10.8 K/uL    RBC 5.40 4.70 - 6.10 M/uL    Hemoglobin 15.7 14.0 - 18.0 g/dL    Hematocrit 47.7 42.0 - 52.0 %    MCV 88.3 81.4 - 97.8 fL    MCH 29.1 27.0 - 33.0 pg    MCHC 32.9 32.3 - 36.5 g/dL    RDW 42.7 35.9 - 50.0 fL    Platelet Count 255 164 - 446 K/uL    MPV 10.2 9.0 - 12.9 fL   Basic Metabolic Panel    Collection Time: 09/24/24  3:52 AM   Result Value Ref Range    Sodium  138 135 - 145 mmol/L    Potassium 4.6 3.6 - 5.5 mmol/L    Chloride 104 96 - 112 mmol/L    Co2 15 (L) 20 - 33 mmol/L    Glucose 94 65 - 99 mg/dL    Bun 22 8 - 22 mg/dL    Creatinine 1.19 0.50 - 1.40 mg/dL    Calcium 8.8 8.5 - 10.5 mg/dL    Anion Gap 19.0 (H) 7.0 - 16.0   ESTIMATED GFR    Collection Time: 24  3:52 AM   Result Value Ref Range    GFR (CKD-EPI) 69 >60 mL/min/1.73 m 2   ABO Rh Confirm    Collection Time: 24  3:52 AM   Result Value Ref Range    ABO Rh Confirm B POS    Heparin Xa (Unfractionated)    Collection Time: 24  7:21 AM   Result Value Ref Range    Heparin Xa (UFH) 0.52 IU/mL   EKG    Collection Time: 24  5:42 PM   Result Value Ref Range    Report       [facility]    Test Date:  2024  Pt Name:    MICHAEL PACE                   Department: 161  MRN:        [id number]                      Room:       [facility]  Gender:     Male                         Technician: MAYLIN  :        1960                   Requested By:TURNER QUINN  Order #:    [id number]                    Reading MD: Rakesh Skelton MD    Measurements  Intervals                                Axis  Rate:       63                           P:          42  OR:         208                          QRS:        75  QRSD:       111                          T:          186  QT:         424  QTc:        435    Interpretive Statements  Sinus rhythm  FIRST DEGREE AV BLOCK  Multiple ventricular premature complexes  Probable left atrial enlargement  LVH with secondary repolarization abnormality  Baseline wander in lead(s) V2  Compared to ECG 2024 14:57:31  Ventricular premature complex(es) now present      Electronically Signed On 2024 13:00:32 PDT by Rakesh Flowers MD     Comp Metabolic Panel (CMP)    Collection Time: 24  8:15 PM   Result Value Ref Range    Sodium 137 135 - 145 mmol/L    Potassium 4.0 3.6 - 5.5 mmol/L    Chloride 101 96 - 112 mmol/L    Co2 19 (L) 20 - 33 mmol/L    Anion Gap 17.0  (H) 7.0 - 16.0    Glucose 127 (H) 65 - 99 mg/dL    Bun 16 8 - 22 mg/dL    Creatinine 0.92 0.50 - 1.40 mg/dL    Calcium 9.1 8.5 - 10.5 mg/dL    Correct Calcium 9.3 8.5 - 10.5 mg/dL    AST(SGOT) 36 12 - 45 U/L    ALT(SGPT) 56 (H) 2 - 50 U/L    Alkaline Phosphatase 69 30 - 99 U/L    Total Bilirubin 0.6 0.1 - 1.5 mg/dL    Albumin 3.8 3.2 - 4.9 g/dL    Total Protein 7.0 6.0 - 8.2 g/dL    Globulin 3.2 1.9 - 3.5 g/dL    A-G Ratio 1.2 g/dL   CBC with Differential    Collection Time: 09/24/24  8:15 PM   Result Value Ref Range    WBC 8.1 4.8 - 10.8 K/uL    RBC 5.57 4.70 - 6.10 M/uL    Hemoglobin 16.8 14.0 - 18.0 g/dL    Hematocrit 49.3 42.0 - 52.0 %    MCV 88.5 81.4 - 97.8 fL    MCH 30.2 27.0 - 33.0 pg    MCHC 34.1 32.3 - 36.5 g/dL    RDW 42.9 35.9 - 50.0 fL    Platelet Count 227 164 - 446 K/uL    MPV 9.9 9.0 - 12.9 fL    Neutrophils-Polys 60.80 44.00 - 72.00 %    Lymphocytes 25.80 22.00 - 41.00 %    Monocytes 7.20 0.00 - 13.40 %    Eosinophils 5.10 0.00 - 6.90 %    Basophils 0.90 0.00 - 1.80 %    Immature Granulocytes 0.20 0.00 - 0.90 %    Nucleated RBC 0.00 0.00 - 0.20 /100 WBC    Neutrophils (Absolute) 4.90 1.82 - 7.42 K/uL    Lymphs (Absolute) 2.08 1.00 - 4.80 K/uL    Monos (Absolute) 0.58 0.00 - 0.85 K/uL    Eos (Absolute) 0.41 0.00 - 0.51 K/uL    Baso (Absolute) 0.07 0.00 - 0.12 K/uL    Immature Granulocytes (abs) 0.02 0.00 - 0.11 K/uL    NRBC (Absolute) 0.00 K/uL   Hemoglobin  A1c    Collection Time: 09/24/24  8:15 PM   Result Value Ref Range    Glycohemoglobin 6.0 (H) 4.0 - 5.6 %    Est Avg Glucose 126 mg/dL   Prothrombin time (INR)    Collection Time: 09/24/24  8:15 PM   Result Value Ref Range    PT 13.5 12.0 - 14.6 sec    INR 1.02 0.87 - 1.13   APTT (PTT)    Collection Time: 09/24/24  8:15 PM   Result Value Ref Range    APTT 61.0 (H) 24.7 - 36.0 sec   Urinalysis    Collection Time: 09/24/24  8:15 PM    Specimen: Urine, Clean Catch   Result Value Ref Range    Color Yellow     Character Clear     Specific Gravity  1.010 <1.035    Ph 6.5 5.0 - 8.0    Glucose Negative Negative mg/dL    Ketones Negative Negative mg/dL    Protein Negative Negative mg/dL    Bilirubin Negative Negative    Urobilinogen, Urine 1.0 Negative    Nitrite Negative Negative    Leukocyte Esterase Negative Negative    Occult Blood Negative Negative    Micro Urine Req see below    COD (Adult)    Collection Time: 09/24/24  8:15 PM   Result Value Ref Range    ABO Grouping Only B     Rh Grouping Only POS     Antibody Screen-Cod NEG    ESTIMATED GFR    Collection Time: 09/24/24  8:15 PM   Result Value Ref Range    GFR (CKD-EPI) 94 >60 mL/min/1.73 m 2   POCT glucose device results    Collection Time: 09/25/24  3:55 AM   Result Value Ref Range    POC Glucose, Blood 95 65 - 99 mg/dL   Basic Metabolic Panel    Collection Time: 09/25/24  4:00 AM   Result Value Ref Range    Sodium 140 135 - 145 mmol/L    Potassium 4.3 3.6 - 5.5 mmol/L    Chloride 107 96 - 112 mmol/L    Co2 19 (L) 20 - 33 mmol/L    Glucose 101 (H) 65 - 99 mg/dL    Bun 14 8 - 22 mg/dL    Creatinine 0.88 0.50 - 1.40 mg/dL    Calcium 9.0 8.5 - 10.5 mg/dL    Anion Gap 14.0 7.0 - 16.0   CBC WITHOUT DIFFERENTIAL    Collection Time: 09/25/24  4:00 AM   Result Value Ref Range    WBC 9.6 4.8 - 10.8 K/uL    RBC 5.39 4.70 - 6.10 M/uL    Hemoglobin 15.8 14.0 - 18.0 g/dL    Hematocrit 46.8 42.0 - 52.0 %    MCV 86.8 81.4 - 97.8 fL    MCH 29.3 27.0 - 33.0 pg    MCHC 33.8 32.3 - 36.5 g/dL    RDW 41.7 35.9 - 50.0 fL    Platelet Count 261 164 - 446 K/uL    MPV 10.4 9.0 - 12.9 fL   MAGNESIUM    Collection Time: 09/25/24  4:00 AM   Result Value Ref Range    Magnesium 1.9 1.5 - 2.5 mg/dL   ESTIMATED GFR    Collection Time: 09/25/24  4:00 AM   Result Value Ref Range    GFR (CKD-EPI) 97 >60 mL/min/1.73 m 2   POCT glucose device results    Collection Time: 09/25/24  8:26 AM   Result Value Ref Range    POC Glucose, Blood 104 (H) 65 - 99 mg/dL   POCT activated clotting time device results    Collection Time: 09/25/24  8:29 AM    Result Value Ref Range    Istat Activated Clotting Time 122 74 - 137 sec   POCT arterial blood gas device results    Collection Time: 09/25/24  8:56 AM   Result Value Ref Range    Ph 7.335 (L) 7.400 - 7.500    Pco2 41.0 (H) 26.0 - 37.0 mmHg    Po2 171 (H) 64 - 87 mmHg    Tco2 23 20 - 33 mmol/L    S02 99 93 - 99 %    Hco3 21.9 17.0 - 25.0 mmol/L    BE -4 -4 - 3 mmol/L    Body Temp 35.5 C degrees    Ph Temp Jayshree 7.357 (L) 7.400 - 7.500    Pco2 Temp Co 38.4 (H) 26.0 - 37.0 mmHg    Po2 Temp Cor 163 (H) 64 - 87 mmHg    Specimen Arterial    POCT sodium device results    Collection Time: 09/25/24  8:56 AM   Result Value Ref Range    Istat Sodium 138 135 - 145 mmol/L   POCT potassium device results    Collection Time: 09/25/24  8:56 AM   Result Value Ref Range    Istat Potassium 4.0 3.6 - 5.5 mmol/L   POCT ionized CA device results    Collection Time: 09/25/24  8:56 AM   Result Value Ref Range    Istat Ionized Calcium 1.08 (L) 1.10 - 1.30 mmol/L   POCT hematocrit and hemoglobin device results    Collection Time: 09/25/24  8:56 AM   Result Value Ref Range    Istat Hematocrit 41 (L) 42 - 52 %    Istat Hemoglobin 13.9 (L) 14.0 - 18.0 g/dL   POCT glucose device results    Collection Time: 09/25/24  9:42 AM   Result Value Ref Range    POC Glucose, Blood 173 (H) 65 - 99 mg/dL   POCT activated clotting time device results    Collection Time: 09/25/24  9:44 AM   Result Value Ref Range    Istat Activated Clotting Time 593 (H) 74 - 137 sec   POCT arterial blood gas device results    Collection Time: 09/25/24  9:44 AM   Result Value Ref Range    Ph 7.306 (L) 7.400 - 7.500    Pco2 43.3 (H) 26.0 - 37.0 mmHg    Po2 436 (H) 64 - 87 mmHg    Tco2 23 20 - 33 mmol/L    S02 100 (H) 93 - 99 %    Hco3 21.6 17.0 - 25.0 mmol/L    BE -5 (L) -4 - 3 mmol/L    Body Temp 34.6 C degrees    Ph Temp Jayshree 7.340 (L) 7.400 - 7.500    Pco2 Temp Co 39.0 (H) 26.0 - 37.0 mmHg    Po2 Temp Cor 421 (H) 64 - 87 mmHg    Specimen Arterial    POCT sodium device  results    Collection Time: 09/25/24  9:44 AM   Result Value Ref Range    Istat Sodium 137 135 - 145 mmol/L   POCT potassium device results    Collection Time: 09/25/24  9:44 AM   Result Value Ref Range    Istat Potassium 3.9 3.6 - 5.5 mmol/L   POCT ionized CA device results    Collection Time: 09/25/24  9:44 AM   Result Value Ref Range    Istat Ionized Calcium 1.08 (L) 1.10 - 1.30 mmol/L   POCT hematocrit and hemoglobin device results    Collection Time: 09/25/24  9:44 AM   Result Value Ref Range    Istat Hematocrit 42 42 - 52 %    Istat Hemoglobin 14.3 14.0 - 18.0 g/dL   POCT activated clotting time device results    Collection Time: 09/25/24 10:20 AM   Result Value Ref Range    Istat Activated Clotting Time 501 (H) 74 - 137 sec   POCT venous blood gas device results    Collection Time: 09/25/24 10:21 AM   Result Value Ref Range    Ph 7.323 7.310 - 7.450    Pco2 43.1 41.0 - 51.0 mmHg    Po2 52 (H) 25 - 40 mmHg    Tco2 24 20 - 33 mmol/L    SO2 84 %    Hco3 22.4 (L) 24.0 - 28.0 mmol/L    BE -3 -4 - 3 mmol/L    Body Temp 34.0 C degrees    Ph Temp Correc 7.366 7.310 - 7.450    Pco2 Temp Jayshree 37.8 (L) 41.0 - 51.0 mmHg    Po2 Temp Corre 42 (H) 25 - 40 mmHg    Specimen Venous    POCT sodium device results    Collection Time: 09/25/24 10:21 AM   Result Value Ref Range    Istat Sodium 137 135 - 145 mmol/L   POCT potassium device results    Collection Time: 09/25/24 10:21 AM   Result Value Ref Range    Istat Potassium 4.5 3.6 - 5.5 mmol/L   POCT ionized CA device results    Collection Time: 09/25/24 10:21 AM   Result Value Ref Range    Istat Ionized Calcium 0.88 (L) 1.10 - 1.30 mmol/L   POCT hematocrit and hemoglobin device results    Collection Time: 09/25/24 10:21 AM   Result Value Ref Range    Istat Hematocrit 26 (L) 42 - 52 %    Istat Hemoglobin 8.8 (L) 14.0 - 18.0 g/dL   POCT glucose device results    Collection Time: 09/25/24 10:21 AM   Result Value Ref Range    POC Glucose, Blood 166 (H) 65 - 99 mg/dL   POCT glucose  device results    Collection Time: 09/25/24 10:51 AM   Result Value Ref Range    POC Glucose, Blood 138 (H) 65 - 99 mg/dL   POCT arterial blood gas device results    Collection Time: 09/25/24 10:53 AM   Result Value Ref Range    Ph 7.365 (L) 7.400 - 7.500    Pco2 38.1 (H) 26.0 - 37.0 mmHg    Po2 342 (H) 64 - 87 mmHg    Tco2 23 20 - 33 mmol/L    S02 100 (H) 93 - 99 %    Hco3 21.8 17.0 - 25.0 mmol/L    BE -3 -4 - 3 mmol/L    Body Temp 34.4 C degrees    Ph Temp Jayshree 7.403 7.400 - 7.500    Pco2 Temp Co 34.0 26.0 - 37.0 mmHg    Po2 Temp Cor 329 (H) 64 - 87 mmHg    Specimen Arterial    POCT sodium device results    Collection Time: 09/25/24 10:53 AM   Result Value Ref Range    Istat Sodium 136 135 - 145 mmol/L   POCT potassium device results    Collection Time: 09/25/24 10:53 AM   Result Value Ref Range    Istat Potassium 4.7 3.6 - 5.5 mmol/L   POCT ionized CA device results    Collection Time: 09/25/24 10:53 AM   Result Value Ref Range    Istat Ionized Calcium 0.92 (L) 1.10 - 1.30 mmol/L   POCT hematocrit and hemoglobin device results    Collection Time: 09/25/24 10:53 AM   Result Value Ref Range    Istat Hematocrit 27 (L) 42 - 52 %    Istat Hemoglobin 9.2 (L) 14.0 - 18.0 g/dL   POCT activated clotting time device results    Collection Time: 09/25/24 10:54 AM   Result Value Ref Range    Istat Activated Clotting Time 593 (H) 74 - 137 sec   POCT activated clotting time device results    Collection Time: 09/25/24 11:56 AM   Result Value Ref Range    Istat Activated Clotting Time 152 (H) 74 - 137 sec   POCT glucose device results    Collection Time: 09/25/24 11:57 AM   Result Value Ref Range    POC Glucose, Blood 155 (H) 65 - 99 mg/dL   POCT arterial blood gas device results    Collection Time: 09/25/24 11:58 AM   Result Value Ref Range    Ph 7.375 (L) 7.400 - 7.500    Pco2 38.9 (H) 26.0 - 37.0 mmHg    Po2 326 (H) 64 - 87 mmHg    Tco2 24 20 - 33 mmol/L    S02 100 (H) 93 - 99 %    Hco3 22.7 17.0 - 25.0 mmol/L    BE -2 -4 - 3  mmol/L    Body Temp 36.5 C degrees    Ph Temp Jayshree 7.382 (L) 7.400 - 7.500    Pco2 Temp Co 38.0 (H) 26.0 - 37.0 mmHg    Po2 Temp Cor 323 (H) 64 - 87 mmHg    Specimen Arterial    POCT sodium device results    Collection Time: 09/25/24 11:58 AM   Result Value Ref Range    Istat Sodium 137 135 - 145 mmol/L   POCT potassium device results    Collection Time: 09/25/24 11:58 AM   Result Value Ref Range    Istat Potassium 4.6 3.6 - 5.5 mmol/L   POCT ionized CA device results    Collection Time: 09/25/24 11:58 AM   Result Value Ref Range    Istat Ionized Calcium 1.10 1.10 - 1.30 mmol/L   POCT hematocrit and hemoglobin device results    Collection Time: 09/25/24 11:58 AM   Result Value Ref Range    Istat Hematocrit 31 (L) 42 - 52 %    Istat Hemoglobin 10.5 (L) 14.0 - 18.0 g/dL   Platelet count    Collection Time: 09/25/24 12:50 PM   Result Value Ref Range    Platelet Count 135 (L) 164 - 446 K/uL   Magnesium    Collection Time: 09/25/24 12:50 PM   Result Value Ref Range    Magnesium 2.8 (H) 1.5 - 2.5 mg/dL   Prothrombin time (INR)    Collection Time: 09/25/24 12:50 PM   Result Value Ref Range    PT 19.6 (H) 12.0 - 14.6 sec    INR 1.65 (H) 0.87 - 1.13   APTT (PTT)    Collection Time: 09/25/24 12:50 PM   Result Value Ref Range    APTT 35.0 24.7 - 36.0 sec   POTASSIUM SERUM (K)    Collection Time: 09/25/24 12:50 PM   Result Value Ref Range    Potassium 3.9 3.6 - 5.5 mmol/L   HEMOGLOBIN AND HEMATOCRIT    Collection Time: 09/25/24 12:50 PM   Result Value Ref Range    Hemoglobin 13.6 (L) 14.0 - 18.0 g/dL    Hematocrit 40.5 (L) 42.0 - 52.0 %   POCT arterial blood gas device results    Collection Time: 09/25/24 12:54 PM   Result Value Ref Range    Ph 7.336 (L) 7.400 - 7.500    Pco2 37.5 (H) 26.0 - 37.0 mmHg    Po2 80 64 - 87 mmHg    Tco2 21 20 - 33 mmol/L    S02 95 93 - 99 %    Hco3 20.0 17.0 - 25.0 mmol/L    BE -5 (L) -4 - 3 mmol/L    Body Temp 35.4 C degrees    O2 Therapy 100 %    iPF Ratio 80     Ph Temp Jayshree 7.359 (L) 7.400 -  7.500    Pco2 Temp Co 35.0 26.0 - 37.0 mmHg    Po2 Temp Cor 72 64 - 87 mmHg    Specimen Arterial     Tim Test N/A     DelSys Vent     End Tidal Carbon Dioxide 32 mmhg    Peep End Expiratory Pressure 8 cmh20    Percent Minute Volume 160     Mode ASV    POCT sodium device results    Collection Time: 24 12:54 PM   Result Value Ref Range    Istat Sodium 140 135 - 145 mmol/L   POCT potassium device results    Collection Time: 24 12:54 PM   Result Value Ref Range    Istat Potassium 3.8 3.6 - 5.5 mmol/L   POCT ionized CA device results    Collection Time: 24 12:54 PM   Result Value Ref Range    Istat Ionized Calcium 1.08 (L) 1.10 - 1.30 mmol/L   EKG on arrival to [facility]SU    Collection Time: 24  1:01 PM   Result Value Ref Range    Report       [facility]    Test Date:  2024  Pt Name:    MICHAEL PACE                   Department: [facility]  MRN:        [id number]                      Room:       [facility]  Gender:     Male                         Technician: GOKUL  :        1960                   Requested By:TURNER QUINN  Order #:    [id number]                    Reading MD: Rakesh Skelton MD    Measurements  Intervals                                Axis  Rate:       77                           P:          84  RI:         191                          QRS:        103  QRSD:       109                          T:          -64  QT:         463  QTc:        525    Interpretive Statements  Sinus rhythm  iLBBB  Abnormal R-wave progression, late transition  Prolonged QT interval  Compared to ECG 2024 17:42:09  First degree AV block no longer present    Electronically Signed On 2024 13:46:44 PDT by Rakesh Skelton MD     POCT arterial blood gas device results    Collection Time: 24  1:49 PM   Result Value Ref Range    Ph 7.273 (LL) 7.400 - 7.500    Pco2 36.0 26.0 - 37.0 mmHg    Po2 89 (H) 64 - 87 mmHg    Tco2 18 (L) 20 - 33 mmol/L    S02 96 93 - 99 %    Hco3 16.7 (L) 17.0 - 25.0 mmol/L     BE -9 (L) -4 - 3 mmol/L    Body Temp 35.3 C degrees    O2 Therapy 100 %    iPF Ratio 89     Ph Temp Jayshree 7.297 (LL) 7.400 - 7.500    Pco2 Temp Co 33.5 26.0 - 37.0 mmHg    Po2 Temp Cor 80 64 - 87 mmHg    Specimen Arterial     Tim Test N/A     DelSys Vent     End Tidal Carbon Dioxide 30 mmhg    Peep End Expiratory Pressure 8 cmh20    Percent Minute Volume 160     Mode ASV    POCT lactate device results    Collection Time: 09/25/24  1:49 PM   Result Value Ref Range    iStat Lactate 1.3 0.5 - 2.0 mmol/L   POCT arterial blood gas device results    Collection Time: 09/25/24  3:00 PM   Result Value Ref Range    Ph 7.294 (LL) 7.400 - 7.500    Pco2 41.2 (H) 26.0 - 37.0 mmHg    Po2 101 (H) 64 - 87 mmHg    Tco2 21 20 - 33 mmol/L    S02 97 93 - 99 %    Hco3 20.0 17.0 - 25.0 mmol/L    BE -6 (L) -4 - 3 mmol/L    Body Temp 35.8 C degrees    O2 Therapy 80 %    iPF Ratio 126     Ph Temp Jayshree 7.311 (L) 7.400 - 7.500    Pco2 Temp Co 39.1 (H) 26.0 - 37.0 mmHg    Po2 Temp Cor 94 (H) 64 - 87 mmHg    Specimen Arterial     DelSys Vent     Peep End Expiratory Pressure 8 cmh20    Percent Minute Volume 160     Mode ASV    POCT lactate device results    Collection Time: 09/25/24  3:00 PM   Result Value Ref Range    iStat Lactate 1.9 0.5 - 2.0 mmol/L       Imaging  DX-CHEST-PORTABLE (1 VIEW)   Final Result      1.  Status post median sternotomy with placement of mediastinal drains.   2.  Interval placement of an endotracheal tube which terminates in satisfactory position at the level of the aortic arch.   3.  Interval insertion of a central venous catheter which terminates with the tip projecting over the expected region of the mid to distal superior vena cava.   4.  Interval insertion of a right-sided [place]-Yung catheter which terminates with tip projecting over the expected location of the right main pulmonary outflow tract.   5.  Mild interstitial edema.   6.  Left basilar atelectasis and/or consolidation.         DX-CHEST-2 VIEWS   Final  Result         1.  Pulmonary edema and/or infiltrates   2.  Small bilateral pleural effusions   3.  Cardiomegaly      US-CAROTID DOPPLER BILAT   Final Result      US-VEIN MAPPING LOWER EXTREMITY BILAT   Final Result      EC-MELISSA W/O CONT    (Results Pending)       Assessment/Plan  S/P CABG (coronary artery bypass graft)  Assessment & Plan  9/25-CABG x 4, LIMA->LAD, RSVG-> Diag2, ramus and RPDA    CBP time 90 mins  Xclamp time72 mins  Epinephrine/norepinephrine for low cardiac output/hypotension as needed-actively titrating norepinephrine  Given depressed LV function likely will need some inotropy in the post-op period, given milrinone coming off CPB  Chest tubes per CVS  Pacer wires in back up mode, functioning, no pacing required intra-op  Post-op MELISSA LVEF 25%, RV function reported as normal     Coronary artery disease involving native coronary artery of native heart without angina pectoris- (present on admission)      Admitted with NSTEMI   LHC done at [facility] 9/20 with multivessel disease  DAPT per CTS  Statin  BB/ACE when hemodynamics allow  Cardiac rehab when clinically appropriate      Encounter for Weaning from Mechanical Ventilation  Attempt to extubate as soon as possible per protocol    RT/O2 protocols  Daily and PRN VBG/ABGs  Titration of ventilator to optimize lung protection, gas exchange and acid-base status   Sedation as tolerated/indicated  Daily CXR  HOB >30 degrees and chlorhexidine for VAP prevention  Pepcid for GI prophylaxis  SAT/SBT when able (ABCDEF Bundle)  Early mobility      Heart failure with reduced ejection fraction (HCC)  Assessment & Plan  Found to have severe hypokinesis globally with an EF of 25% without any thrombus seen  Clinically appears euvolemic  GDMT as tolerated post op  If renal function remains stable        Discussed patient condition and risk of morbidity and/or mortality with Family, RN, RT, Therapies, Pharmacy, Dietary, Patient, and CVS.    The patient remains  critically ill.  Critical care time = 45 minutes in directly providing and coordinating critical care and extensive data review.  No time overlap and excludes procedures.

## 2024-09-25 NOTE — ANESTHESIA PREPROCEDURE EVALUATION
Case: [id number] Date/Time: 09/25/24 0745    Procedures:       CABG, WITH ENDOSCOPIC VEIN PROCUREMENT - X3-4 (Chest)      ECHOCARDIOGRAM, TRANSESOPHAGEAL, INTRAOPERATIVE (Esophagus)    Anesthesia type: General    Pre-op diagnosis: severe symptomatic multivessel coronary artery disease    Location: [facility] OR 03 / SURGERY [facility]    Surgeons: Pierre Sheffield D.O.            Relevant Problems   CARDIAC   (positive) NSTEMI (non-ST elevated myocardial infarction) (HCC)       Physical Exam    Airway   Mallampati: II  TM distance: >3 FB  Neck ROM: full       Cardiovascular - normal exam  Rhythm: regular  Rate: normal  (-) murmur     Dental - normal exam           Pulmonary - normal exam  Breath sounds clear to auscultation     Abdominal    Neurological - normal exam                   Anesthesia Plan    ASA 4   ASA physical status 4 criteria: severe reduction of ejection fractions    Plan - general       Airway plan will be ETT  MELISSA Planned        Induction: intravenous    Postoperative Plan: Postoperative administration of opioids is intended.    Pertinent diagnostic labs and testing reviewed    Informed Consent:    Anesthetic plan and risks discussed with patient.    Use of blood products discussed with: patient whom consented to blood products.

## 2024-09-26 ENCOUNTER — APPOINTMENT (OUTPATIENT)
Dept: RADIOLOGY | Facility: MEDICAL CENTER | Age: 64
DRG: 235 | End: 2024-09-26
Attending: NURSE PRACTITIONER
Payer: COMMERCIAL

## 2024-09-26 LAB
ANION GAP SERPL CALC-SCNC: 8 MMOL/L (ref 7–16)
BUN SERPL-MCNC: 9 MG/DL (ref 8–22)
CA-I SERPL-SCNC: 0.9 MMOL/L (ref 1.1–1.3)
CALCIUM SERPL-MCNC: 6.6 MG/DL (ref 8.5–10.5)
CHLORIDE SERPL-SCNC: 109 MMOL/L (ref 96–112)
CO2 SERPL-SCNC: 23 MMOL/L (ref 20–33)
CREAT SERPL-MCNC: 0.69 MG/DL (ref 0.5–1.4)
EKG IMPRESSION: NORMAL
ERYTHROCYTE [DISTWIDTH] IN BLOOD BY AUTOMATED COUNT: 44.2 FL (ref 35.9–50)
GFR SERPLBLD CREATININE-BSD FMLA CKD-EPI: 105 ML/MIN/1.73 M 2
GLUCOSE BLD STRIP.AUTO-MCNC: 106 MG/DL (ref 65–99)
GLUCOSE BLD STRIP.AUTO-MCNC: 108 MG/DL (ref 65–99)
GLUCOSE BLD STRIP.AUTO-MCNC: 110 MG/DL (ref 65–99)
GLUCOSE BLD STRIP.AUTO-MCNC: 113 MG/DL (ref 65–99)
GLUCOSE BLD STRIP.AUTO-MCNC: 123 MG/DL (ref 65–99)
GLUCOSE BLD STRIP.AUTO-MCNC: 132 MG/DL (ref 65–99)
GLUCOSE BLD STRIP.AUTO-MCNC: 136 MG/DL (ref 65–99)
GLUCOSE BLD STRIP.AUTO-MCNC: 137 MG/DL (ref 65–99)
GLUCOSE BLD STRIP.AUTO-MCNC: 147 MG/DL (ref 65–99)
GLUCOSE BLD STRIP.AUTO-MCNC: 152 MG/DL (ref 65–99)
GLUCOSE BLD STRIP.AUTO-MCNC: 164 MG/DL (ref 65–99)
GLUCOSE BLD STRIP.AUTO-MCNC: 165 MG/DL (ref 65–99)
GLUCOSE BLD STRIP.AUTO-MCNC: 177 MG/DL (ref 65–99)
GLUCOSE BLD STRIP.AUTO-MCNC: 180 MG/DL (ref 65–99)
GLUCOSE BLD STRIP.AUTO-MCNC: 196 MG/DL (ref 65–99)
GLUCOSE BLD STRIP.AUTO-MCNC: 199 MG/DL (ref 65–99)
GLUCOSE BLD STRIP.AUTO-MCNC: 200 MG/DL (ref 65–99)
GLUCOSE BLD STRIP.AUTO-MCNC: 204 MG/DL (ref 65–99)
GLUCOSE BLD STRIP.AUTO-MCNC: 205 MG/DL (ref 65–99)
GLUCOSE BLD STRIP.AUTO-MCNC: 206 MG/DL (ref 65–99)
GLUCOSE BLD STRIP.AUTO-MCNC: 210 MG/DL (ref 65–99)
GLUCOSE BLD STRIP.AUTO-MCNC: 211 MG/DL (ref 65–99)
GLUCOSE BLD STRIP.AUTO-MCNC: 215 MG/DL (ref 65–99)
GLUCOSE BLD STRIP.AUTO-MCNC: 99 MG/DL (ref 65–99)
GLUCOSE BLD STRIP.AUTO-MCNC: 99 MG/DL (ref 65–99)
GLUCOSE SERPL-MCNC: 142 MG/DL (ref 65–99)
HCT VFR BLD AUTO: 36.9 % (ref 42–52)
HGB BLD-MCNC: 12.6 G/DL (ref 14–18)
MCH RBC QN AUTO: 30.4 PG (ref 27–33)
MCHC RBC AUTO-ENTMCNC: 34.1 G/DL (ref 32.3–36.5)
MCV RBC AUTO: 88.9 FL (ref 81.4–97.8)
PLATELET # BLD AUTO: 134 K/UL (ref 164–446)
PMV BLD AUTO: 10.6 FL (ref 9–12.9)
POTASSIUM SERPL-SCNC: 3.9 MMOL/L (ref 3.6–5.5)
RBC # BLD AUTO: 4.15 M/UL (ref 4.7–6.1)
SODIUM SERPL-SCNC: 140 MMOL/L (ref 135–145)
WBC # BLD AUTO: 15.8 K/UL (ref 4.8–10.8)

## 2024-09-26 PROCEDURE — 99291 CRITICAL CARE FIRST HOUR: CPT | Performed by: EMERGENCY MEDICINE

## 2024-09-26 PROCEDURE — 700105 HCHG RX REV CODE 258: Performed by: NURSE PRACTITIONER

## 2024-09-26 PROCEDURE — 97163 PT EVAL HIGH COMPLEX 45 MIN: CPT

## 2024-09-26 PROCEDURE — 94669 MECHANICAL CHEST WALL OSCILL: CPT

## 2024-09-26 PROCEDURE — 93010 ELECTROCARDIOGRAM REPORT: CPT | Performed by: INTERNAL MEDICINE

## 2024-09-26 PROCEDURE — 700111 HCHG RX REV CODE 636 W/ 250 OVERRIDE (IP): Mod: JZ | Performed by: NURSE PRACTITIONER

## 2024-09-26 PROCEDURE — 82330 ASSAY OF CALCIUM: CPT

## 2024-09-26 PROCEDURE — A9270 NON-COVERED ITEM OR SERVICE: HCPCS | Performed by: NURSE PRACTITIONER

## 2024-09-26 PROCEDURE — 93005 ELECTROCARDIOGRAM TRACING: CPT | Performed by: NURSE PRACTITIONER

## 2024-09-26 PROCEDURE — 94799 UNLISTED PULMONARY SVC/PX: CPT

## 2024-09-26 PROCEDURE — 97535 SELF CARE MNGMENT TRAINING: CPT

## 2024-09-26 PROCEDURE — 80048 BASIC METABOLIC PNL TOTAL CA: CPT

## 2024-09-26 PROCEDURE — 71045 X-RAY EXAM CHEST 1 VIEW: CPT

## 2024-09-26 PROCEDURE — 700102 HCHG RX REV CODE 250 W/ 637 OVERRIDE(OP): Performed by: NURSE PRACTITIONER

## 2024-09-26 PROCEDURE — 85027 COMPLETE CBC AUTOMATED: CPT

## 2024-09-26 PROCEDURE — 770022 HCHG ROOM/CARE - ICU (200)

## 2024-09-26 PROCEDURE — 700102 HCHG RX REV CODE 250 W/ 637 OVERRIDE(OP): Performed by: EMERGENCY MEDICINE

## 2024-09-26 PROCEDURE — 82962 GLUCOSE BLOOD TEST: CPT | Mod: 91

## 2024-09-26 PROCEDURE — 99024 POSTOP FOLLOW-UP VISIT: CPT | Performed by: NURSE PRACTITIONER

## 2024-09-26 RX ORDER — POTASSIUM CHLORIDE 7.45 MG/ML
10 INJECTION INTRAVENOUS ONCE
Status: COMPLETED | OUTPATIENT
Start: 2024-09-26 | End: 2024-09-26

## 2024-09-26 RX ORDER — POTASSIUM CHLORIDE 1500 MG/1
20 TABLET, EXTENDED RELEASE ORAL 2 TIMES DAILY
Status: DISCONTINUED | OUTPATIENT
Start: 2024-09-26 | End: 2024-09-28

## 2024-09-26 RX ORDER — INSULIN LISPRO 100 [IU]/ML
2-9 INJECTION, SOLUTION INTRAVENOUS; SUBCUTANEOUS
Status: DISCONTINUED | OUTPATIENT
Start: 2024-09-26 | End: 2024-09-28

## 2024-09-26 RX ORDER — DEXTROSE MONOHYDRATE 25 G/50ML
25 INJECTION, SOLUTION INTRAVENOUS
Status: DISCONTINUED | OUTPATIENT
Start: 2024-09-26 | End: 2024-09-30 | Stop reason: HOSPADM

## 2024-09-26 RX ORDER — FUROSEMIDE 10 MG/ML
20 INJECTION INTRAMUSCULAR; INTRAVENOUS
Status: DISCONTINUED | OUTPATIENT
Start: 2024-09-26 | End: 2024-09-27

## 2024-09-26 RX ADMIN — Medication 1 APPLICATOR: at 08:19

## 2024-09-26 RX ADMIN — ACETAMINOPHEN 1000 MG: 500 TABLET ORAL at 17:06

## 2024-09-26 RX ADMIN — ACETAMINOPHEN 1000 MG: 500 TABLET ORAL at 23:56

## 2024-09-26 RX ADMIN — POTASSIUM CHLORIDE 20 MEQ: 14.9 INJECTION, SOLUTION INTRAVENOUS at 00:24

## 2024-09-26 RX ADMIN — ACETAMINOPHEN 1000 MG: 500 TABLET ORAL at 12:10

## 2024-09-26 RX ADMIN — SENNOSIDES AND DOCUSATE SODIUM 2 TABLET: 50; 8.6 TABLET ORAL at 17:06

## 2024-09-26 RX ADMIN — POTASSIUM CHLORIDE 10 MEQ: 7.45 INJECTION INTRAVENOUS at 06:17

## 2024-09-26 RX ADMIN — POLYETHYLENE GLYCOL 3350 1 PACKET: 17 POWDER, FOR SOLUTION ORAL at 06:14

## 2024-09-26 RX ADMIN — OMEPRAZOLE 20 MG: 20 CAPSULE, DELAYED RELEASE ORAL at 06:14

## 2024-09-26 RX ADMIN — ONDANSETRON 8 MG: 2 INJECTION INTRAMUSCULAR; INTRAVENOUS at 08:36

## 2024-09-26 RX ADMIN — ACETAMINOPHEN 1000 MG: 500 TABLET ORAL at 00:00

## 2024-09-26 RX ADMIN — CLOPIDOGREL BISULFATE 75 MG: 75 TABLET ORAL at 06:14

## 2024-09-26 RX ADMIN — ASPIRIN 81 MG: 81 TABLET, COATED ORAL at 06:15

## 2024-09-26 RX ADMIN — Medication 1 APPLICATOR: at 20:49

## 2024-09-26 RX ADMIN — DOBUTAMINE IN DEXTROSE 5 MCG/KG/MIN: 100 INJECTION, SOLUTION INTRAVENOUS at 07:35

## 2024-09-26 RX ADMIN — MAGNESIUM SULFATE HEPTAHYDRATE 1 G: 1 INJECTION, SOLUTION INTRAVENOUS at 06:30

## 2024-09-26 RX ADMIN — CALCIUM CHLORIDE 1000 MG: 100 INJECTION, SOLUTION INTRAVENOUS at 08:20

## 2024-09-26 RX ADMIN — INSULIN LISPRO 2 UNITS: 100 INJECTION, SOLUTION INTRAVENOUS; SUBCUTANEOUS at 20:56

## 2024-09-26 RX ADMIN — SENNOSIDES AND DOCUSATE SODIUM 2 TABLET: 50; 8.6 TABLET ORAL at 06:14

## 2024-09-26 RX ADMIN — OXYCODONE 5 MG: 5 TABLET ORAL at 02:21

## 2024-09-26 RX ADMIN — ACETAMINOPHEN 1000 MG: 500 TABLET ORAL at 06:15

## 2024-09-26 RX ADMIN — ATORVASTATIN CALCIUM 40 MG: 40 TABLET, FILM COATED ORAL at 20:49

## 2024-09-26 RX ADMIN — FUROSEMIDE 20 MG: 10 INJECTION, SOLUTION INTRAVENOUS at 12:10

## 2024-09-26 RX ADMIN — POTASSIUM CHLORIDE 20 MEQ: 1500 TABLET, EXTENDED RELEASE ORAL at 17:06

## 2024-09-26 ASSESSMENT — PAIN DESCRIPTION - PAIN TYPE
TYPE: ACUTE PAIN
TYPE: ACUTE PAIN;SURGICAL PAIN
TYPE: ACUTE PAIN
TYPE: ACUTE PAIN
TYPE: ACUTE PAIN;SURGICAL PAIN
TYPE: SURGICAL PAIN;ACUTE PAIN
TYPE: ACUTE PAIN
TYPE: ACUTE PAIN;SURGICAL PAIN
TYPE: ACUTE PAIN;SURGICAL PAIN
TYPE: ACUTE PAIN
TYPE: ACUTE PAIN
TYPE: ACUTE PAIN;SURGICAL PAIN
TYPE: ACUTE PAIN

## 2024-09-26 ASSESSMENT — COGNITIVE AND FUNCTIONAL STATUS - GENERAL
SUGGESTED CMS G CODE MODIFIER MOBILITY: CK
STANDING UP FROM CHAIR USING ARMS: A LITTLE
CLIMB 3 TO 5 STEPS WITH RAILING: A LITTLE
MOVING FROM LYING ON BACK TO SITTING ON SIDE OF FLAT BED: A LITTLE
MOVING TO AND FROM BED TO CHAIR: A LITTLE
WALKING IN HOSPITAL ROOM: A LITTLE
MOBILITY SCORE: 18
TURNING FROM BACK TO SIDE WHILE IN FLAT BAD: A LITTLE

## 2024-09-26 ASSESSMENT — GAIT ASSESSMENTS
DEVIATION: BRADYKINETIC;SHUFFLED GAIT
ASSISTIVE DEVICE: FRONT WHEEL WALKER
GAIT LEVEL OF ASSIST: CONTACT GUARD ASSIST
DISTANCE (FEET): 150

## 2024-09-26 ASSESSMENT — FIBROSIS 4 INDEX: FIB4 SCORE: 2.19

## 2024-09-26 NOTE — PROGRESS NOTES
Monitor Summary:    SR: 70-90    Occasional PAC's  .19/.10/.46  BBB    Measurements from EKG. No monitor strip available.

## 2024-09-26 NOTE — PROGRESS NOTES
MARIO Rey contacted regarding extubating pt while on both Dobut at 10 and Epi at 0.16. Per MARIO Rey, ok to extubate while on both once pt meets all parameters if epi remains at 0.16 or less. If titration up in dose needed, rest on ASV overnight and attempt extubation in AM.

## 2024-09-26 NOTE — PROGRESS NOTES
MARIO Villegas @ bedside, order to decrease dobutamine to 5 mcg/kg/min, orders implemented.     Repeat swan numbers 1 hour later are as follows:    Epi off, dobutamine @ 5     CO: 3.7  CI: 2.2  Wedge: 19  SVR: 1402  PVR: 107  CVP: 11

## 2024-09-26 NOTE — PROGRESS NOTES
Brian numbers:    CO: 4.8  CI: 2.9  SVR: 930  PVR: 66  Wedge: 9  CVP: 6     10mcg/kg/min  Epi 0.12mcg/kg/min

## 2024-09-26 NOTE — PROGRESS NOTES
Brian numbers:       CO: 5.0  CI: 2.9  SVR: 1010  PVR: 64  Wedge: 14  CVP: 7      7.5mcg/kg/min  Epi 0.08mcg/kg/min

## 2024-09-26 NOTE — CARE PLAN
Problem: Knowledge Deficit - Standard  Goal: Patient and family/care givers will demonstrate understanding of plan of care, disease process/condition, diagnostic tests and medications  Outcome: Progressing     Problem: Post Op Day 1 CABG/Heart Valve Replacement  Goal: Optimal care of the post op CABG/heart valve replacement Post Op Day 1  Intervention: EKG and CXR completed  Note: AM EKG and Chest x-ray complete   Intervention: All valve patients: PT/INR daily  Note: N/a, pt cabg x4   Intervention: Antibiotics are discontinued within 24 hours of anesthesia end time unless indication documented for continuation beyond 24 hours  Note: Complete   Intervention: Daily weights in the morning  Note: AM Weight: 69.6 kg   Intervention: Up in chair for all meals  Note: Complete for breakfast and lunch, to be completed for dinner   Intervention: Ambulate in am if stable. First ambulation 25 feet. Repeat x 3 as tolerated  Note: Ambulated 150 x1 this PM   Intervention: Discontinue blackwell catheter unless documented reason for continuation  Note: To remain in place at this time   Intervention: Assess surgical dressing and check provider orders for potential removal  Note: To be removed @ 1200   Intervention: Ensure referal to intensive cardiac rehab is ordered, and smoking cessation education if appropriate  Note: Completed   Intervention: [facility] trained RN to remove chest tubes if ordered by provider  Note: To remain in place at this time   Intervention: IS q 1 hour while awake and record best IS volume  Note: Best IS: 1000  Intervention: Knee high LARRY hose, on during the day, off at night  Note: Completed   Intervention: Transfer to [facility] status, begin VS q 4 hours  Note: N/a  Intervention: After 24th hour post-anesthesia end time, transition patient to Cardiac Surgery SQ Insulin Protocol  Note: Transitioned off per protocol   Intervention: If patient is CABG or on home beta-blocker, start/resume beta-blocker on POD 1 or POD 2 or  document contraindication  Note: Not resumed, on dobutamine and epi on start of shift.      The patient is Watcher - Medium risk of patient condition declining or worsening    Shift Goals  Clinical Goals: monitor CO/CI   Patient Goals: MILLA  Family Goals: Clinical improvement, extubate    Progress made toward(s) clinical / shift goals:  off pressors, mobilized, swan and art line to be removed pending shift progression. Mobilized and resting at this time     Patient is not progressing towards the following goals:

## 2024-09-26 NOTE — PROGRESS NOTES
Brian numbers:    CO: 3.4  CI: 2.0  SVR: 1377  PVR: 119  Wedge: 15  CVP: 8     10 mcg/kg/min  Epi 0.14 mcg/kg/min

## 2024-09-26 NOTE — CARE PLAN
Problem: Pain Management  Goal: Pain level will decrease to patient's comfort goal  Outcome: Not Progressing     Problem: Day of surgery post CABG/Heart valve replacement  Goal: Stabilization in immediate post op period  Outcome: Progressing  Intervention: VS q 15 min x 4 hours, then q 1 hour. Include temperature immediately upon arrival. Check CO/CI q 2-4 hours and PRN  Note: Done per protocol  Intervention: If radial artery used, elevate arm, no BP checks or needle sticks from affected arm, monitor ulnar pulse and capillary refill  Note: N/A  Intervention: First post op hour labs and EKG per order  Note: Done on dayshift  Intervention: Serum K q 6 hours x 24 hours.  ABG and CBC prn.  Note: Done.   Intervention: Initiate post cardiac insulin infusion protocol orders for FSBS greater than 140 and check frequency per protocol  Note: Pt on insulin gtt and BG checked per protocol.   Intervention: FSBS frequency as per Cardiac Surgery Insulin Drip Protocol  Note: Done.   Intervention: For patients on Beta Blockers: verify dose given prior to surgery or within 6 hours after arrival to the unit  Note: Done.   Intervention: Chest tube to 20 cm suction, record CT drainage with VS, and check for air leak  Note: CT to -20cm suction. No airleak noted.   Intervention: For CT drainage >300 mL in first hour post op and/or 150 mL in subsequent hours: Stat platelets, PT, INR, TEG, iSTAT, and H&H per order  Note: N/A  Intervention: Titrate and wean off vasoactive drips per patient's condition and per MD order while maintaining SBP  mmHg per MD order  Note: Titrated per parameters.   Intervention: VAP protocol in place  Note: In place while intubated.   Intervention: Wean from Vent per protocol (see protocol), extubation goal within 6 hours post op  Note: Done. Pt extubated at 2150 after meeting all parameters.   Intervention: IS q 1 hour while awake post extubation  Note: Best IS: 1000  Intervention: Bedrest until extubated  and groin lines out  Note: Done.   Intervention: Dangle within 4 hours post extubation  Note: Done. Pt tolerated well; generalized weakness.   Intervention: Up in chair 4 hours, day of extubation  Note: Up in chair this AM.

## 2024-09-26 NOTE — PROGRESS NOTES
Dobutamine stopped, epi off. Repeat numbers:     CO: 2.8  CI: 1.6  CVP: 10   Wedge: 21  SVR: 1811  PVR: 58

## 2024-09-26 NOTE — THERAPY
"Physical Therapy   Cardiac Rehab Initial Evaluation and Discharge     Patient Name: Gold Bustamante  Age:  63 y.o., Sex:  male  Medical Record #: [id number]  Today's Date: 9/26/2024     Precautions  Precautions: Fall Risk;Cardiac Precautions (See Comments);Sternal Precautions (See Comments)  Comments: chest tube    Assessment  Patient is 63 y.o. male s/p CABG x4 9/25. PMHx of Non-STEMI, ischemic cardiomyopathy, acute and chronic congestive heart failure, multivessel coronary artery disease, tobacco abuse. Pt receptive to edu with \"Recovering from Heart Surgery\" handout provided regarding sternal precautions, cardiac decision tree, walking program, talk test, RPE scale, BP/HR monitoring, s/s heart failure response, Cardiac rehab phase I and II, activity pacing and progression, following up with cardiologist, and modifiable risk factors. Pt required CGA with FWW to ambulated 150ft with RPE 9, negative talk test, BP did drop with activity (see details below), requiring 6L of O2. Anticipate pt will progress to home with OP cardiac rehab and likely will not require FWW by time of d/c, but recommend currently. Pt would benefit from continued acute IP PT services to address said deficits.     Plan    Physical Therapy Initial Treatment Plan   Treatment Plan : Bed Mobility, Equipment, Family / Caregiver Training, Gait Training, Manual Therapy, Neuro Re-Education / Balance, Self Care / Home Evaluation, Stair Training, Therapeutic Activities, Therapeutic Exercise  Treatment Frequency: 5 Times per Week  Duration: Until Therapy Goals Met    DC Equipment Recommendations: Front-Wheel Walker (anticipate will not require by time of d/c)  Discharge Recommendations:  (anticipate pt will progress to home with OP cardiac rehab)       Subjective    \"I don't want to cough my heart out.\" Pt jokingly stating due to increase in coughing this afternoon     Objective     09/26/24 1439   Vitals   Blood Pressure   (seated 132/68 HR 79; standing 118/64 " HR 80; returned seated  immediately after ambulation 108/57 HR 84; seated 2 min 112/58. Denied lightheadedness, dizziness)   O2 (LPM) 6   O2 Delivery Device Silicone Nasal Cannula; SpO2 93-97%   Vitals Comments new O2 needs, on RA at baseline   Pain 0 - 10 Group   Therapist Pain Assessment Post Activity Pain Same as Prior to Activity;Nurse Notified;0   Prior Living Situation   Housing / Facility 1 Story Apartment / Condo   Steps Into Home 1   Steps In Home 0   Rail None   Bathroom Set up Bathtub / Shower Combination;Shower Glass Doors   Equipment Owned None   Lives with - Patient's Self Care Capacity Unrelated Adult   Comments lives with roommate   Prior Level of Functional Mobility   Bed Mobility Independent   Transfer Status Independent   Ambulation Independent   Ambulation Distance   (community, was hiking)   Assistive Devices Used None   Stairs Independent   Cognition    Cognition / Consciousness WDL   Level of Consciousness Alert   Comments very pleasant and cooperative, jovial   Strength Upper Body   Comments appeared functional for mobility within precautions   Active ROM Lower Body    Active ROM Lower Body  WDL   Strength Lower Body   Comments BLE grossly 4/5   Balance Assessment   Sitting Balance (Static) Fair +   Sitting Balance (Dynamic) Fair   Standing Balance (Static) Fair -   Standing Balance (Dynamic) Fair -   Weight Shift Sitting Fair   Weight Shift Standing Fair   Comments w/ FWW   Bed Mobility    Comments NT, reviewed log roll   Gait Analysis   Gait Level Of Assist Contact Guard Assist   Assistive Device Front Wheel Walker   Distance (Feet) 150   # of Times Distance was Traveled 1   Deviation Bradykinetic;Shuffled Gait   Weight Bearing Status no restrictions   Functional Mobility   Sit to Stand Minimal Assist   Bed, Chair, Wheelchair Transfer Contact Guard Assist   Transfer Method Stand Step   Mobility w/ FWW in hallway, return to reclined   ICU Target Mobility Level   ICU Mobility - Targeted Level  Level 4   6 Clicks Assessment - How much HELP from from another person do you currently need... (If the patient hasn't done an activity recently, how much help from another person do you think he/she would need if he/she tried?)   Turning from your back to your side while in a flat bed without using bedrails? 3   Moving from lying on your back to sitting on the side of a flat bed without using bedrails? 3   Moving to and from a bed to a chair (including a wheelchair)? 3   Standing up from a chair using your arms (e.g., wheelchair, or bedside chair)? 3   Walking in hospital room? 3   Climbing 3-5 steps with a railing? 3   6 clicks Mobility Score 18   Activity Tolerance   Sitting in Chair up pre/post   Standing 10+ minutes   Edema / Skin Assessment   Comments chest tube intact pre/post session, returned to suction   Patient / Family Goals    Patient / Family Goal #1 to walk   Goal #1 Outcome Goal met   Short Term Goals    Short Term Goal # 1 Pt will perform supine <> sit with SPV in 6 visits to get in/out of bed   Short Term Goal # 2 Pt will perform stand step transfers with LRAD and SPV in 6 visits to get in/out of chair   Short Term Goal # 3 Pt will ambulate 150ft with LRAD and SPV in 6 visits to access home environment   Short Term Goal # 4 Pt will ascend/descend 1 step with unilateral UE support and SPV in 6 visits to safely enter/exit home   Education Group   Education Provided Role of Physical Therapist;Gait Training;Use of Assistive Device;Cardiac Precautions;Sternal Precautions   Cardiac Precautions Patient Response Patient;Family;Acceptance;Explanation;Verbal Demonstration;Action Demonstration;Demonstration;Handout;Reinforcement Needed   Sternal Precautions Patient Response Patient;Family;Acceptance;Explanation;Demonstration;Handout;Verbal Demonstration;Action Demonstration;Reinforcement Needed   Role of Physical Therapist Patient Response Patient;Acceptance;Explanation;Verbal Demonstration;Family;Handout    Gait Training Patient Response Patient;Family;Acceptance;Explanation;Action Demonstration;Verbal Demonstration   Use of Assistive Device Patient Response Patient;Family;Acceptance;Explanation;Verbal Demonstration;Action Demonstration   Additional Comments Pt receptive to self management and compensatory strategies with mobility, home management strategies s/p [facility]   Physical Therapy Initial Treatment Plan    Treatment Plan  Bed Mobility;Equipment;Family / Caregiver Training;Gait Training;Manual Therapy;Neuro Re-Education / Balance;Self Care / Home Evaluation;Stair Training;Therapeutic Activities;Therapeutic Exercise   Treatment Frequency 5 Times per Week   Duration Until Therapy Goals Met   Problem List    Problems Impaired Bed Mobility;Impaired Transfers;Impaired Ambulation;Functional Strength Deficit;Impaired Balance;Decreased Activity Tolerance;Limited Knowledge of Post-Op Precautions   Anticipated Discharge Equipment and Recommendations   DC Equipment Recommendations Front-Wheel Walker  (anticipate will not require by time of d/c)   Discharge Recommendations   (anticipate pt will progress to home with OP cardiac rehab)   Interdisciplinary Plan of Care Collaboration   IDT Collaboration with  Nursing;Family / Caregiver   Patient Position at End of Therapy Seated;Call Light within Reach;Tray Table within Reach;Phone within Reach;Family / Friend in Room  (as found, RN present and aware)   Collaboration Comments RN present for eval   Session Information   Date / Session Number  9/26- 1 (1/5, 10/2)   Priority   ([facility] 9/25)

## 2024-09-26 NOTE — PROGRESS NOTES
All extubation parameters met. Epi at 0.12 mcg/kg/min. ABG done at 2140. Pt extubated at 2150 to 4 L NC.

## 2024-09-26 NOTE — PROGRESS NOTES
Cardiovascular Surgery Progress Note    Name: Gold Bustamante  MRN: [id number]  : 1960  Admit Date: 2024  2:52 PM  1 Day Post-Op     Procedure:  Procedure(s) and Anesthesia Type:     * CABG X 4, WITH ENDOSCOPIC VEIN PROCUREMENT - General     * ECHOCARDIOGRAM, TRANSESOPHAGEAL, INTRAOPERATIVE - General    Vitals:  Vitals:    24 0600 24 0615 24 0630 24 0645   BP: 130/66 121/60 109/58    Pulse: (!) 109 (!) 101 100 98   Resp: (!) 21 (!) 9 12 (!) 9   Temp:       TempSrc:       SpO2: 95%      Weight: 69.6 kg (153 lb 7 oz)      Height:          Temp (24hrs), Av.6 °C (97.9 °F), Min:35.2 °C (95.4 °F), Max:37.7 °C (99.9 °F)      Respiratory:  Vent Mode: ASV, PEEP/CPAP: 8, PIP: 17, MAP: 9.8 Respiration: (!) 9, Pulse Oximetry: 95 %       Fluids:    Intake/Output Summary (Last 24 hours) at 2024 0731  Last data filed at 2024 0600  Gross per 24 hour   Intake 9521.08 ml   Output 7248 ml   Net 2273.08 ml     Admit weight: Weight: 63.1 kg (139 lb 1.8 oz)  Current weight: Weight: 69.6 kg (153 lb 7 oz) (24 0600)    Labs:  Recent Labs     24  0400 24  1250 24  0355   WBC 8.1 9.6  --  15.8*   RBC 5.57 5.39  --  4.15*   HEMOGLOBIN 16.8 15.8 13.6* 12.6*   HEMATOCRIT 49.3 46.8 40.5* 36.9*   MCV 88.5 86.8  --  88.9   MCH 30.2 29.3  --  30.4   MCHC 34.1 33.8  --  34.1   RDW 42.9 41.7  --  44.2   PLATELETCT 227 261 135* 134*   MPV 9.9 10.4  --  10.6     Recent Labs     24  0400 24  1250 24  1711 24  2300 24  0355   SODIUM 137 140  --   --   --  140   POTASSIUM 4.0 4.3   < > 4.0 3.7 3.9   CHLORIDE 101 107  --   --   --  109   CO2 19* 19*  --   --   --  23   GLUCOSE 127* 101*  --   --   --  142*   BUN 16 14  --   --   --  9   CREATININE 0.92 0.88  --   --   --  0.69   CALCIUM 9.1 9.0  --   --   --  6.6*    < > = values in this interval not displayed.     Recent Labs     24  1250   APTT 61.0* 35.0    INR 1.02 1.65*       HgbA1c:  6    Diabetic Educator Consult:  no    Medications:  Scheduled Medications   Medication Dose Frequency    insulin regular  0-14 Units Once    insulin lispro  0-14 Units TID AC    enoxaparin (LOVENOX) injection  40 mg DAILY AT 1800    Nozin nasal  swab  1 Applicator BID    magnesium sulfate  1 g DAILY    Pharmacy Consult Request  1 Each PHARMACY TO DOSE    acetaminophen  1,000 mg Q6HRS    senna-docusate  2 Tablet BID    And    polyethylene glycol/lytes  1 Packet DAILY    And    [START ON 9/27/2024] magnesium hydroxide  30 mL DAILY    omeprazole  20 mg DAILY    carvedilol  3.125 mg BID WITH MEALS    aspirin  81 mg DAILY    clopidogrel  75 mg DAILY    atorvastatin  40 mg QHS        Exam:   Physical Exam  Vitals and nursing note reviewed.   Constitutional:       General: He is not in acute distress.     Appearance: Normal appearance.   HENT:      Head: Normocephalic and atraumatic.      Right Ear: External ear normal.      Left Ear: External ear normal.      Nose: Nose normal.      Mouth/Throat:      Mouth: Mucous membranes are moist.   Eyes:      Extraocular Movements: Extraocular movements intact.      Conjunctiva/sclera: Conjunctivae normal.      Pupils: Pupils are equal, round, and reactive to light.   Cardiovascular:      Rate and Rhythm: Normal rate and regular rhythm.      Pulses: Normal pulses.      Heart sounds: Normal heart sounds.   Pulmonary:      Effort: Pulmonary effort is normal.   Abdominal:      General: Abdomen is flat.      Palpations: Abdomen is soft.   Musculoskeletal:         General: Normal range of motion.      Cervical back: Normal range of motion.      Right lower leg: Edema present.      Left lower leg: Edema present.   Skin:     General: Skin is warm and dry.      Capillary Refill: Capillary refill takes less than 2 seconds.      Comments: Sternum- CDI  RSVG- ace wrap   Neurological:      General: No focal deficit present.      Mental Status: He is  alert and oriented to person, place, and time. Mental status is at baseline.   Psychiatric:         Mood and Affect: Mood normal.         Behavior: Behavior normal.         Cardiac Medications:    ASA - Yes    Plavix - Yes    Post-operative Beta Blockers - No; contraindicated because of Hypotension    Ace/ARB- No; contraindicated because of Hypotension    Statin - Yes    Aldactone- No; contraindicated because of Hypotension    SGLT2i-  No contraindicated because of No; contraindicated because of Hypotension    Ejection Fraction:  25%    Telemetry:   9/26 SR/ST    Assessment/Plan:  POD 1 Extubated.  On dobutamine 7.5 epi 0.02.  Mild dizziness.  Adequate BP.  Moderate output from mediastinals.  Cr 0.69 Hgb 13.6.  Plan: Keep mediastinal tubes and blackwell.  Wean dobutamine to 5 and recheck swan numbers following.  Wean epi.  Wean oxygen as tolerated.  Diurese when bp improved.  Replete calcium.    Disposition:  TBD

## 2024-09-26 NOTE — PROGRESS NOTES
Critical Care Progress Note    Date of admission  9/22/2024    Chief Complaint  63 y.o. male who presented 9/22/2024 with past medical history hypertension, diabetes, and coronary artery disease who [place] [facility] [place].  Patient presented to an outside facility with chest pain, underwent CT chest without pulmonary embolism, had bilateral small effusions, and pulmonary edema.  He underwent an infectious workup had a negative RVP and then was transferred to a neighboring hospital here in [place] for further care.  At [facility] he underwent a cardiac evaluation and noted to have markedly elevated troponin with a TTE showing reduced ejection fraction (25%) and LVH and some hypokinesis along the septum, he was transferred here for cardiology evaluation and treated with antithrombotics and heparin.  At admission he underwent left heart catheterization showed multivessel coronary disease and was referred to cardiothoracic surgery for surgical revascularization.     Today was taken to the OR with Dr. Mark where he underwent 4v CABG. the operative case was relatively unremarkable.  The patient was hemodynamically stable throughout the case he received 650 of Cell Saver, 3 L of crystalloid, no blood transfusion, had 800 cc of urine output, and whereas his initial preop TTE showed a very low ejection fraction reportedly at 15%, he improved to an EF of around 25% and with improved systolic function.  Patient did require a milrinone bolus coming off of bypass per anesthesia.     He arrives to ICU on 0.04 of epinephrine, 0.02 of norepinephrine, and his chest x-ray shows bilateral pulmonary edema, initial blood gas 7.3 7/38/326 on 100% FiO2.    Hospital Course  9/26-POD 1, titrating inotropes and vasopressors, replace Ca++, BiV dysfunction based on Giles/, titrating insulin gtt,     Interval Problem Update  Reviewed last 24 hour events:  Extubated @ 2150 last night  Required active  titration of inopressor and  guided by PAC hemos  Well appearing, no complaints, in chair when I see him    Neuro:   Oxy for pain    CV:  HR 90s-111  SBP 90//50 (PP 40-50)    PAC Hemos @ 425am:  Dobut- 7.5mcg/kg/min  Epi- 0.08mcg/kg/min  CO: 5.0  CI: 2.9  SVR: 1010--> 1402  Wedge: 14-->19  CVP: 7-->11  Giles 1.1  -0.7     Resp:   4L NC  CXR patchy infiltrates      I/O: +2273  UOP: 4715   total overnight, 80 cc more this AM    Tmax: AF  Heme: WBC 15.8    Abx:   None    Micro:   NA    Endo: BG WTR, on insulin infusion    LDA: RIJ sheath with PAC, [place], Pacer wires capped, CT x 2, blackwell  SUP: PPI  VTE: DAPT/Enox this danielle  Diet: CLD      Review of Systems  Review of Systems   All other systems reviewed and are negative.       Vital Signs for last 24 hours   Temp:  [36.4 °C (97.5 °F)-37.7 °C (99.9 °F)] 37.5 °C (99.5 °F)  Pulse:  [] 78  Resp:  [6-52] 11  BP: ()/(53-72) 112/58  SpO2:  [91 %-99 %] 96 %    Hemodynamic parameters for last 24 hours  CVP:  [7 MM HG-8 MM HG] 7 MM HG  PCWP:  [14 MM HG-16 MM HG] 14 MM HG  CO:  [2.53-4.97] 2.77  CI:  [1.5-2.93] 1.63    Respiratory Information for the last 24 hours  Vent Mode: ASV  PEEP/CPAP: 8  MAP: 9.8  Control VTE (exp VT): 422    Physical Exam   Physical Exam  Constitutional:       General: He is not in acute distress.     Appearance: He is not ill-appearing.   HENT:      Head: Normocephalic.      Mouth/Throat:      Mouth: Mucous membranes are moist.   Eyes:      Pupils: Pupils are equal, round, and reactive to light.   Cardiovascular:      Rate and Rhythm: Regular rhythm.   Pulmonary:      Effort: Pulmonary effort is normal.      Breath sounds: No rales.   Abdominal:      Palpations: Abdomen is soft.   Musculoskeletal:      Cervical back: Neck supple.      Right lower leg: No edema.      Left lower leg: No edema.   Skin:     General: Skin is warm.      Capillary Refill: Capillary refill takes less than 2 seconds.   Neurological:      General: No  focal deficit present.      Mental Status: He is oriented to person, place, and time. Mental status is at baseline.         Medications  Current Facility-Administered Medications   Medication Dose Route Frequency Provider Last Rate Last Admin    insulin lispro (HumaLOG,AdmeLOG) subcutaneous injection  2-9 Units Subcutaneous 4X/DAY CHEN Gomez M.D.        And    dextrose 50% (D50W) injection 25 g  25 g Intravenous Q15 MIN PRN Addy Gomez M.D.        furosemide (Lasix) injection 20 mg  20 mg Intravenous BID DIURETIC JAYNA McneillP.RLindaN.   20 mg at 09/26/24 1210    potassium chloride SA (Kdur) tablet 20 mEq  20 mEq Oral BID JAYNA McneillPLindaRKASH        norepinephrine (Levophed) 8 mg in 250 mL NS infusion (premix)  0-1 mcg/kg/min (Ideal) Intravenous Continuous Pierre S Ziat, D.O.   Stopped at 09/25/24 1512    EPINEPHrine (Adrenalin) infusion 4 mg/250 mL (premix)  0-0.5 mcg/kg/min (Ideal) Intravenous Continuous Ellis S Knbalbinastedt, D.O.   Stopped at 09/26/24 0800    Respiratory Therapy Consult   Nebulization Continuous RT JAYNA McneillPLindaRKASH        NS infusion   Intravenous Continuous JAYNA McneillP.R.N.   Stopped at 09/25/24 1346    NS infusion   Intravenous Continuous JAYNA McneillP.R.N.   Stopped at 09/26/24 0824    electrolyte-A (Plasmalyte-A) infusion   Intravenous PRN JAYNA McneillPLindaRCARLOS. 999 mL/hr at 09/25/24 1329 New Bag at 09/25/24 1329    enoxaparin (Lovenox) inj 40 mg  40 mg Subcutaneous DAILY AT 1800 JAYNA McneillPLindaRCARLOS.        Nozin nasal  swab  1 Applicator Each Nostril BID VEGA McneillR.N.   1 Applicator at 09/26/24 0819    magnesium sulfate in D5W IVPB premix 1 g  1 g Intravenous DAILY JAYNA McneillP.R.N.   Stopped at 09/26/24 0730    clevidipine (Cleviprex) IV emulsion  0-21 mg/hr Intravenous Continuous ARIN Mcneill        nitroglycerin 50 mg in D5W 250 ml infusion  0-100 mcg/min Intravenous  Continuous VEGA McneillRCARLOS.        Pharmacy Consult Request ...Pain Management Review 1 Each  1 Each Other PHARMACY TO DOSE VEGA McneillRCARLOS.        acetaminophen (Tylenol) tablet 1,000 mg  1,000 mg Oral Q6HRS JAYNA McneillP.R.N.   1,000 mg at 09/26/24 1210    Followed by    [START ON 10/5/2024] acetaminophen (Tylenol) tablet 1,000 mg  1,000 mg Oral Q6HRS PRN JAYNA McneillP.R.N.        oxyCODONE immediate-release (Roxicodone) tablet 5 mg  5 mg Oral Q3HRS PRN JAYNA McneillP.R.N.   5 mg at 09/26/24 0221    Or    oxyCODONE immediate release (Roxicodone) tablet 10 mg  10 mg Oral Q3HRS PRN JAYNA McneillP.R.GARLAND.        Or    fentaNYL (Sublimaze) injection 50 mcg  50 mcg Intravenous Q3HRS PRN TISHA Mcneill.P.R.N.        traMADol (Ultram) 50 MG tablet 50 mg  50 mg Oral Q4HRS PRN TISHA Mcneill.P.R.N.        ondansetron (Zofran) syringe/vial injection 8 mg  8 mg Intravenous Q6HRS PRN TISHA Mcneill.P.R.N.   8 mg at 09/26/24 0836    Or    prochlorperazine (Compazine) injection 10 mg  10 mg Intravenous Q6HRS PRN TISHA Mcneill.P.RCARLOS.        senna-docusate (Pericolace Or Senokot S) 8.6-50 MG per tablet 2 Tablet  2 Tablet Oral BID JAYNA McneillP.R.N.   2 Tablet at 09/26/24 0614    And    polyethylene glycol/lytes (Miralax) Packet 1 Packet  1 Packet Oral DAILY JAYNA McneillPLindaR.N.   1 Packet at 09/26/24 0614    And    [START ON 9/27/2024] magnesium hydroxide (Milk Of Magnesia) suspension 30 mL  30 mL Oral DAILY JAYNA McneillP.RKASH        And    bisacodyl (Dulcolax) suppository 10 mg  10 mg Rectal QDAY PRN ARIN Mcneill        omeprazole (PriLOSEC) capsule 20 mg  20 mg Oral DAILY VEGA McneillR.NLinda   20 mg at 09/26/24 0614    mag hydrox-al hydrox-simeth (Maalox Plus Es Or Mylanta Ds) suspension 30 mL  30 mL Oral Q4HRS PRN VEGA McneillRCARLOS.        diphenhydrAMINE (Benadryl) tablet/capsule 25 mg  25 mg Oral HS PRN - MR  X 1 JAYNA McneillP.RKASH        aspirin EC tablet 81 mg  81 mg Oral DAILY JAYNA McneillP.R.NLinda   81 mg at 09/26/24 0615    clopidogrel (Plavix) tablet 75 mg  75 mg Oral DAILY JAYNA McneillP.R.NLinda   75 mg at 09/26/24 0614    atorvastatin (Lipitor) tablet 40 mg  40 mg Oral QHS JAYNA McneillP.RLindaNLinda        DOBUTamine (Dobutrex) 1 mg/1 mL premix infusion  5 mcg/kg/min (Ideal) Intravenous Continuous Jeff JAYNA ValdiviaP.R.NLinda   Stopped at 09/26/24 0952    nitroglycerin (Nitrostat) tablet 0.4 mg  0.4 mg Sublingual Q5 MIN PRN Jose Montague M.D.           Fluids    Intake/Output Summary (Last 24 hours) at 9/26/2024 1542  Last data filed at 9/26/2024 1044  Gross per 24 hour   Intake 3041.88 ml   Output 1644 ml   Net 1397.88 ml       Laboratory  Recent Labs     09/25/24  1715 09/25/24  1905 09/25/24  2141   ISTATAPH 7.328* 7.335* 7.340*   ISTATAPCO2 41.3* 41.8* 33.0   ISTATAPO2 81 77 86   ISTATATCO2 23 24 19*   [id number] 95 94 96   ISTATARTHCO3 21.7 22.3 17.8   ISTATARTBE -4 -3 -7*   ISTATTEMP 37.2 C 37.3 C 37.6 C   ISTATFIO2 50 40 40   ISTATSPEC Arterial Arterial Arterial   ISTATAPHTC 7.326* 7.330* 7.331*   [id number] 82 79 89*         Recent Labs     09/24/24 2015 09/25/24  0400 09/25/24  1250 09/25/24  1711 09/25/24  2300 09/26/24  0355   SODIUM 137 140  --   --   --  140   POTASSIUM 4.0 4.3 3.9 4.0 3.7 3.9   CHLORIDE 101 107  --   --   --  109   CO2 19* 19*  --   --   --  23   BUN 16 14  --   --   --  9   CREATININE 0.92 0.88  --   --   --  0.69   MAGNESIUM  --  1.9 2.8*  --   --   --    CALCIUM 9.1 9.0  --   --   --  6.6*     Recent Labs     09/24/24 2015 09/25/24 0400 09/26/24  0355   ALTSGPT 56*  --   --    ASTSGOT 36  --   --    ALKPHOSPHAT 69  --   --    TBILIRUBIN 0.6  --   --    GLUCOSE 127* 101* 142*     Recent Labs     09/24/24 2015 09/25/24 0400 09/26/24  0355   WBC 8.1 9.6 15.8*   NEUTSPOLYS 60.80  --   --    LYMPHOCYTES 25.80  --   --    MONOCYTES 7.20  --   --    EOSINOPHILS  5.10  --   --    BASOPHILS 0.90  --   --    ASTSGOT 36  --   --    ALTSGPT 56*  --   --    ALKPHOSPHAT 69  --   --    TBILIRUBIN 0.6  --   --      Recent Labs     09/24/24 2015 09/25/24  0400 09/25/24  1250 09/26/24  0355   RBC 5.57 5.39  --  4.15*   HEMOGLOBIN 16.8 15.8 13.6* 12.6*   HEMATOCRIT 49.3 46.8 40.5* 36.9*   PLATELETCT 227 261 135* 134*   PROTHROMBTM 13.5  --  19.6*  --    APTT 61.0*  --  35.0  --    INR 1.02  --  1.65*  --        Imaging  X-Ray:  I have personally reviewed the images and compared with prior images.    Assessment/Plan  S/P CABG (coronary artery bypass graft)  Assessment & Plan  9/25-CABG x 4, LIMA->LAD, RSVG-> Diag2, ramus and RPDA    CBP time 90 mins  Xclamp time72 mins  Epinephrine/norepinephrine for low cardiac output/hypotension as needed-actively titrating norepinephrine  Given depressed LV function likely will need some inotropy in the post-op period, given milrinone coming off CPB  Chest tubes per CVS  Pacer wires in back up mode, functioning, no pacing required intra-op  Post-op MELISSA LVEF 25%, RV function reported as normal     Coronary artery disease involving native coronary artery of native heart without angina pectoris- (present on admission)      Admitted with NSTEMI   LHC [facility] at [facility] 9/20 with multivessel disease  DAPT per CTS  Statin  BB/ACE when hemodynamics allow  Cardiac rehab when clinically appropriate      Encounter for Weaning from Mechanical Ventilation  Extubated     Breathing unlabored  ISS/PEP  Mobility  Wean 02 as able      Heart failure with reduced ejection fraction (HCC)  Assessment & Plan  Found to have severe hypokinesis globally with an EF of 25% without any thrombus seen  Clinically appears euvolemic  GDMT as tolerated post op  If renal function remains stable         I have performed a physical exam and reviewed and updated ROS and Plan today (9/26/2024). In review of yesterday's note (9/25/2024), there are no changes except as documented above.      Discussed patient condition and risk of morbidity and/or mortality with Family, RN, RT, Therapies, Pharmacy, Dietary, Patient, and CVS  The patient remains critically ill.  Critical care time = 45 minutes in directly providing and coordinating critical care and extensive data review.  No time overlap and excludes procedures.

## 2024-09-26 NOTE — PROGRESS NOTES
Brian numbers:    CO:2.8  CI:1.7  SVR:1848  PVR:86  Wedge:16  CVP:8    : 5   Epi:0.12      Updated Angie DON Orders to increase Dobutamine to 10

## 2024-09-26 NOTE — HOSPITAL COURSE
9/26-POD 1, titrating inotropes and vasopressors, replace Ca++, BiV dysfunction based on Giles/, titrating insulin gtt,   9/27-Mild cough, well supported, on NC< well appearing, diurese, ok to transfer out of ICU

## 2024-09-26 NOTE — PROGRESS NOTES
Updated Dr. Gomez that swan is no longer wedging after sitting pt up. Ok to use diastolic for wedge.

## 2024-09-26 NOTE — PROGRESS NOTES
Discussed 12 pm swan numbers with MARIO Rey. Ok to pull art and swan, do not restart dobutamine at this time.

## 2024-09-27 ENCOUNTER — PATIENT OUTREACH (OUTPATIENT)
Dept: SCHEDULING | Facility: IMAGING CENTER | Age: 64
End: 2024-09-27
Payer: COMMERCIAL

## 2024-09-27 LAB
ANION GAP SERPL CALC-SCNC: 9 MMOL/L (ref 7–16)
BUN SERPL-MCNC: 13 MG/DL (ref 8–22)
CALCIUM SERPL-MCNC: 8.4 MG/DL (ref 8.5–10.5)
CHLORIDE SERPL-SCNC: 104 MMOL/L (ref 96–112)
CO2 SERPL-SCNC: 24 MMOL/L (ref 20–33)
CREAT SERPL-MCNC: 1.04 MG/DL (ref 0.5–1.4)
ERYTHROCYTE [DISTWIDTH] IN BLOOD BY AUTOMATED COUNT: 46.5 FL (ref 35.9–50)
GFR SERPLBLD CREATININE-BSD FMLA CKD-EPI: 81 ML/MIN/1.73 M 2
GLUCOSE BLD STRIP.AUTO-MCNC: 105 MG/DL (ref 65–99)
GLUCOSE BLD STRIP.AUTO-MCNC: 105 MG/DL (ref 65–99)
GLUCOSE BLD STRIP.AUTO-MCNC: 131 MG/DL (ref 65–99)
GLUCOSE BLD STRIP.AUTO-MCNC: 150 MG/DL (ref 65–99)
GLUCOSE SERPL-MCNC: 122 MG/DL (ref 65–99)
HCT VFR BLD AUTO: 37.6 % (ref 42–52)
HGB BLD-MCNC: 12.4 G/DL (ref 14–18)
MCH RBC QN AUTO: 30.4 PG (ref 27–33)
MCHC RBC AUTO-ENTMCNC: 33 G/DL (ref 32.3–36.5)
MCV RBC AUTO: 92.2 FL (ref 81.4–97.8)
PLATELET # BLD AUTO: 119 K/UL (ref 164–446)
PMV BLD AUTO: 10.6 FL (ref 9–12.9)
POTASSIUM SERPL-SCNC: 5.1 MMOL/L (ref 3.6–5.5)
RBC # BLD AUTO: 4.08 M/UL (ref 4.7–6.1)
SODIUM SERPL-SCNC: 137 MMOL/L (ref 135–145)
WBC # BLD AUTO: 18.8 K/UL (ref 4.8–10.8)

## 2024-09-27 PROCEDURE — 700111 HCHG RX REV CODE 636 W/ 250 OVERRIDE (IP): Mod: JZ | Performed by: NURSE PRACTITIONER

## 2024-09-27 PROCEDURE — A9270 NON-COVERED ITEM OR SERVICE: HCPCS | Performed by: NURSE PRACTITIONER

## 2024-09-27 PROCEDURE — 99024 POSTOP FOLLOW-UP VISIT: CPT | Performed by: NURSE PRACTITIONER

## 2024-09-27 PROCEDURE — 94669 MECHANICAL CHEST WALL OSCILL: CPT

## 2024-09-27 PROCEDURE — 700102 HCHG RX REV CODE 250 W/ 637 OVERRIDE(OP): Performed by: NURSE PRACTITIONER

## 2024-09-27 PROCEDURE — 82962 GLUCOSE BLOOD TEST: CPT

## 2024-09-27 PROCEDURE — 99233 SBSQ HOSP IP/OBS HIGH 50: CPT | Performed by: EMERGENCY MEDICINE

## 2024-09-27 PROCEDURE — 770020 HCHG ROOM/CARE - TELE (206)

## 2024-09-27 PROCEDURE — 85027 COMPLETE CBC AUTOMATED: CPT

## 2024-09-27 PROCEDURE — 80048 BASIC METABOLIC PNL TOTAL CA: CPT

## 2024-09-27 PROCEDURE — 97535 SELF CARE MNGMENT TRAINING: CPT

## 2024-09-27 PROCEDURE — 97166 OT EVAL MOD COMPLEX 45 MIN: CPT

## 2024-09-27 RX ORDER — GUAIFENESIN 600 MG/1
600 TABLET, EXTENDED RELEASE ORAL EVERY 12 HOURS
Status: DISCONTINUED | OUTPATIENT
Start: 2024-09-27 | End: 2024-09-30 | Stop reason: HOSPADM

## 2024-09-27 RX ORDER — FUROSEMIDE 10 MG/ML
20 INJECTION INTRAMUSCULAR; INTRAVENOUS ONCE
Status: COMPLETED | OUTPATIENT
Start: 2024-09-27 | End: 2024-09-27

## 2024-09-27 RX ORDER — AMLODIPINE BESYLATE 5 MG/1
5 TABLET ORAL
Status: DISCONTINUED | OUTPATIENT
Start: 2024-09-27 | End: 2024-09-29

## 2024-09-27 RX ORDER — FUROSEMIDE 10 MG/ML
40 INJECTION INTRAMUSCULAR; INTRAVENOUS
Status: DISCONTINUED | OUTPATIENT
Start: 2024-09-27 | End: 2024-09-28

## 2024-09-27 RX ORDER — CARVEDILOL 3.12 MG/1
3.12 TABLET ORAL 2 TIMES DAILY WITH MEALS
Status: DISCONTINUED | OUTPATIENT
Start: 2024-09-27 | End: 2024-09-30 | Stop reason: HOSPADM

## 2024-09-27 RX ADMIN — SENNOSIDES AND DOCUSATE SODIUM 2 TABLET: 50; 8.6 TABLET ORAL at 05:39

## 2024-09-27 RX ADMIN — GUAIFENESIN 600 MG: 600 TABLET, EXTENDED RELEASE ORAL at 17:43

## 2024-09-27 RX ADMIN — Medication 1 APPLICATOR: at 08:58

## 2024-09-27 RX ADMIN — ACETAMINOPHEN 1000 MG: 500 TABLET ORAL at 05:39

## 2024-09-27 RX ADMIN — Medication 1 APPLICATOR: at 20:13

## 2024-09-27 RX ADMIN — CARVEDILOL 3.12 MG: 3.12 TABLET, FILM COATED ORAL at 17:38

## 2024-09-27 RX ADMIN — ACETAMINOPHEN 1000 MG: 500 TABLET ORAL at 23:50

## 2024-09-27 RX ADMIN — AMLODIPINE BESYLATE 5 MG: 10 TABLET ORAL at 09:42

## 2024-09-27 RX ADMIN — MAGNESIUM HYDROXIDE 30 ML: 1200 LIQUID ORAL at 05:39

## 2024-09-27 RX ADMIN — GUAIFENESIN 600 MG: 600 TABLET, EXTENDED RELEASE ORAL at 09:08

## 2024-09-27 RX ADMIN — ACETAMINOPHEN 1000 MG: 500 TABLET ORAL at 12:14

## 2024-09-27 RX ADMIN — ATORVASTATIN CALCIUM 40 MG: 40 TABLET, FILM COATED ORAL at 20:13

## 2024-09-27 RX ADMIN — MAGNESIUM SULFATE HEPTAHYDRATE 1 G: 1 INJECTION, SOLUTION INTRAVENOUS at 05:49

## 2024-09-27 RX ADMIN — FUROSEMIDE 40 MG: 10 INJECTION, SOLUTION INTRAVENOUS at 17:36

## 2024-09-27 RX ADMIN — ASPIRIN 81 MG: 81 TABLET, COATED ORAL at 05:40

## 2024-09-27 RX ADMIN — FUROSEMIDE 20 MG: 10 INJECTION, SOLUTION INTRAVENOUS at 09:08

## 2024-09-27 RX ADMIN — ACETAMINOPHEN 1000 MG: 500 TABLET ORAL at 17:35

## 2024-09-27 RX ADMIN — POTASSIUM CHLORIDE 20 MEQ: 1500 TABLET, EXTENDED RELEASE ORAL at 05:40

## 2024-09-27 RX ADMIN — POLYETHYLENE GLYCOL 3350 1 PACKET: 17 POWDER, FOR SOLUTION ORAL at 05:39

## 2024-09-27 RX ADMIN — FUROSEMIDE 20 MG: 10 INJECTION, SOLUTION INTRAVENOUS at 05:40

## 2024-09-27 RX ADMIN — ENOXAPARIN SODIUM 40 MG: 100 INJECTION SUBCUTANEOUS at 17:39

## 2024-09-27 RX ADMIN — CLOPIDOGREL BISULFATE 75 MG: 75 TABLET ORAL at 05:40

## 2024-09-27 RX ADMIN — OMEPRAZOLE 20 MG: 20 CAPSULE, DELAYED RELEASE ORAL at 05:39

## 2024-09-27 RX ADMIN — POTASSIUM CHLORIDE 20 MEQ: 1500 TABLET, EXTENDED RELEASE ORAL at 17:38

## 2024-09-27 ASSESSMENT — COGNITIVE AND FUNCTIONAL STATUS - GENERAL
SUGGESTED CMS G CODE MODIFIER DAILY ACTIVITY: CK
DAILY ACTIVITIY SCORE: 18
DRESSING REGULAR LOWER BODY CLOTHING: A LOT
TOILETING: A LITTLE
DRESSING REGULAR UPPER BODY CLOTHING: A LITTLE
HELP NEEDED FOR BATHING: A LITTLE
PERSONAL GROOMING: A LITTLE

## 2024-09-27 ASSESSMENT — PAIN DESCRIPTION - PAIN TYPE
TYPE: ACUTE PAIN;SURGICAL PAIN

## 2024-09-27 ASSESSMENT — FIBROSIS 4 INDEX: FIB4 SCORE: 2.47

## 2024-09-27 ASSESSMENT — ACTIVITIES OF DAILY LIVING (ADL): TOILETING: INDEPENDENT

## 2024-09-27 NOTE — DISCHARGE PLANNING
Discharge Appointments/outpatient referrals/ and HH set up:    Cardiac surgery follow up appointment made for 4-5 weeks out    Hospital discharge team/schedulers called for [place] cardiology f/u appointment for MD within 3-4 weeks if possible.    Aortic surveillance program/vascular medicine referral not needed, orders not placed.    Anticoagulation referral/ coumadin clinic referral not needed and orders not placed .    INR draws are not indicated for CABG procedure and Plavix.    Will await OT notes and medical progression to determine further discharge needs. FWW recommended by PT.

## 2024-09-27 NOTE — THERAPY
Occupational Therapy   Initial Evaluation     Patient Name: Gold Bustamante  Age:  63 y.o., Sex:  male  Medical Record #: [id number]  Today's Date: 9/27/2024     Precautions  Precautions: Fall Risk, Sternal Precautions (See Comments), Cardiac Precautions (See Comments)  Comments: s/p CABG x4    Assessment    Patient is 63 y.o. male s/p CABG x4 9/25. Other pertinent medical history includes NSTEMI, ischemic cardiomyopathy, acute and chronic respiratory failure, multivessel coronary disease, and tobacco abuse. Pt seen for OT evaluation. Pt required SBA-CGA for functional mobility, toilet transfer, standing handwashing, and toilet hygiene. Pt required max A to don/doff socks. Pt reported that he lives with a roommate who works, but will have family staying with him to assist as needed 24/7. Pt reported that his family can stay as long as needed for his recovery. Pt educated regarding the role of OT, precautions related to ADLs, and shower safety. Pt current functional performance limited by impaired activity tolerance, impaired balance, generalized weakness, and pain. Pt will benefit from skilled OT while admitted to acute care.     Plan    Occupational Therapy Initial Treatment Plan   Treatment Interventions: Self Care / Activities of Daily Living, Adaptive Equipment, Neuro Re-Education / Balance, Therapeutic Exercises, Therapeutic Activity  Treatment Frequency: 4 Times per Week  Duration: Until Therapy Goals Met    DC Equipment Recommendations: Unable to determine at this time  Discharge Recommendations: Recommend home health for continued occupational therapy services (as long as family comfortable/able to provide assist as needed, if not, recommend placement)      Objective     09/27/24 1522   Prior Living Situation   Prior Services None   Housing / Facility 1 Story Apartment / Condo   Steps Into Home 1   Steps In Home 0   Bathroom Set up Bathtub / Shower Combination;Shower Glass Doors   Equipment Owned None   Lives with -  Patient's Self Care Capacity Unrelated Adult   Comments Pt lives with a roommate who works. Pt reported that family is planning to stay with him 24/7 to assist with recovery as needed. Pt reported they can stay until he is fully recovered.   Prior Level of ADL Function   Self Feeding Independent   Grooming / Hygiene Independent   Bathing Independent   Dressing Independent   Toileting Independent   Prior Level of IADL Function   Medication Management Independent   Laundry Independent   Kitchen Mobility Independent   Finances Independent   Home Management Independent   Shopping Independent   Prior Level Of Mobility Independent Without Device in Community;Independent Without Device in Home   Driving / Transportation Driving Independent   History of Falls   History of Falls No   Precautions   Precautions Fall Risk;Sternal Precautions (See Comments);Cardiac Precautions (See Comments)   Vitals   Pulse 87   Blood Pressure 121/65   Pulse Oximetry 96 %   O2 (LPM) 4   O2 Delivery Device Silicone Nasal Cannula   Vitals Comments Vitals remained stable   Pain   Pain Scales 0 to 10 Scale    Pain 0 - 10 Group   Therapist Pain Assessment Post Activity Pain Same as Prior to Activity;Nurse Notified  (not rated, agreeable to session)   Cognition    Cognition / Consciousness WDL   Level of Consciousness Alert   Comments Very pleasant and cooperative, receptive to education   Passive ROM Upper Body   Passive ROM Upper Body WDL   Active ROM Upper Body   Active ROM Upper Body  WDL   Strength Upper Body   Upper Body Strength  WDL   Sensation Upper Body   Upper Extremity Sensation  WDL   Upper Body Muscle Tone   Upper Body Muscle Tone  WDL   Coordination Upper Body   Coordination WDL   Balance Assessment   Sitting Balance (Static) Fair +   Sitting Balance (Dynamic) Fair   Standing Balance (Static) Fair   Standing Balance (Dynamic) Fair -   Weight Shift Sitting Fair   Weight Shift Standing Fair   Comments w/ FWW   Bed Mobility    Comments up  to chair pre/post   ADL Assessment   Grooming Standby Assist;Standing  (washed hands)   Lower Body Dressing Maximal Assist  (socks)   Toileting Contact Guard Assist  (BM on toilet)   Functional Mobility   Sit to Stand Standby Assist   Bed, Chair, Wheelchair Transfer Contact Guard Assist   Toilet Transfers Contact Guard Assist   Transfer Method Stand Step   Mobility chair>bathroom>chair   Comments w/ FWW   ICU Target Mobility Level   ICU Mobility - Targeted Level Level 4   Visual Perception   Visual Perception  Not Tested   Activity Tolerance   Sitting in Chair up to chair pre/post   Sitting Edge of Bed NT   Standing >5 min   Comments limited by weakness, pt recently returned from walking the [place] with nursing staff and was still motivated to work with therapy   Patient / Family Goals   Patient / Family Goal #1 to go home   Short Term Goals   Short Term Goal # 1 Pt will perform ADL transfer w/ supv   Short Term Goal # 2 Pt will perform toilet hygiene w/ supv   Short Term Goal # 3 Pt will perform LB dressing w/ supv and AE PRN   Education Group   Education Provided Role of Occupational Therapist;Activities of Daily Living;Cardiac Precautions;Sternal Precautions;Home Safety   Role of Occupational Therapist Patient Response Patient;Family;Acceptance;Explanation;Verbal Demonstration   Cardiac Precautions Patient Response Patient;Family;Acceptance;Explanation;Verbal Demonstration   Sternal Precautions Patient Response Patient;Family;Acceptance;Explanation;Demonstration;Verbal Demonstration;Action Demonstration   Home Safety Patient Response Patient;Family;Acceptance;Explanation;Verbal Demonstration   ADL Patient Response Patient;Family;Acceptance;Explanation;Verbal Demonstration;Demonstration;Action Demonstration   Occupational Therapy Initial Treatment Plan    Treatment Interventions Self Care / Activities of Daily Living;Adaptive Equipment;Neuro Re-Education / Balance;Therapeutic Exercises;Therapeutic Activity    Treatment Frequency 4 Times per Week   Duration Until Therapy Goals Met   Problem List   Problem List Decreased Active Daily Living Skills;Decreased Homemaking Skills;Decreased Functional Mobility;Decreased Activity Tolerance;Impaired Postural Control / Balance;Limited Knowledge of Post Op Precautions

## 2024-09-27 NOTE — CARE PLAN
Problem: Post Op Day 1 CABG/Heart Valve Replacement  Goal: Optimal care of the post op CABG/heart valve replacement Post Op Day 1  Intervention: Daily weights in the morning  Note: Done  Intervention: Up in chair for all meals  Note: In the chair for all meals  Intervention: IS q 1 hour while awake and record best IS volume  Note: 750     Problem: Post op day 2 CABG/Heart Valve Replacement  Goal: Optimal care of the post op CABG/heart valve replacement post op day 2  Note: Done  Intervention: Daily weights in the morning  Note: Done  Intervention: Up in chair for all meals  Note: In the chair for all meals  Intervention: IS q 1 hour while awake and record best IS volume  Note: 750  Intervention: FSBS: when 2 consecutive BS < 130 after post op day 2, discontinue FSBS unless patient is insulin dependent diabetic  Note: Still on sliding scale  Intervention: Daily weights in the morning  Note: In the chair for all meals  Intervention: Up in chair for all meals  Note: He ambulated 4 times this shift  Intervention: Ambulate 4 times daily, increasing the distance each time  Note: Incision with soap and water  Intervention: Stand at sink and wash up with assistance.  Clean incisions twice daily with soap and water.  Note: 750  Intervention: IS q 1 hour while awake and record best IS volume  Note: Removed by Jeff this am  Intervention: Consider pacer wire removal by MD  Note: Keep rosalva per Jeff     Problem: Post Op Day 3 CABG/Heart Valve replacement  Goal: Optimal care of the post op CABG/Heart Valve replacement post op day 3  Intervention: Daily weights in the morning  Note: Done  Intervention: Up in chair for all meals  Note: In the chair for all meals  Intervention: IS q 1 hour while awake and record best IS volume  Note: 750  Intervention: Ambulate 4 times daily, increasing the distance each time  Note: Incision with soap and water     Problem: Post Op Day 4 CABG/Heart Valve Replacement  Goal: Optimal care of the Post  Op CABG/Heart Valve replacement Post Op Day 4  Intervention: Daily weights in the morning  Note: Done  Intervention: Up in chair for all meals  Note: In the chair for all meals  Intervention: IS q 1 hour while awake and record best IS volume  Note: 750  Intervention: Ambulate 4 times daily, increasing the distance each time  Note: Incision with soap and water     Problem: Post Op Day 5 CABG/Heart Valve Replacement  Goal: Optimal care of the Post Op CABG/Heart Valve replacement Post Op Day 5  Intervention: Daily weights in the morning  Note: Done  Intervention: Up in chair for all meals  Note: In the chair for all meals  Intervention: IS q 1 hour while awake and record best IS volume  Note: 750  Intervention: Ambulate 4 times daily, increasing the distance each time  Note: Incision with soap and water   The patient is Stable - Low risk of patient condition declining or worsening    Shift Goals  Clinical Goals: VSS, rest, comfort  Patient Goals: rest, sit in chair  Family Goals: questions for CTS    Progress made toward(s) clinical / shift goals:  Tele orders

## 2024-09-27 NOTE — CARE PLAN
Problem: Hyperinflation  Goal: Prevent or improve atelectasis  Description: Target End Date:  3 to 4 days    1. Instruct incentive spirometry usage  2.  Perform hyperinflation therapy as indicated  Outcome: Progressing   PEP  QID/

## 2024-09-27 NOTE — PROGRESS NOTES
Cardiovascular Surgery Progress Note    Name: Gold Bustamante  MRN: [id number]  : 1960  Admit Date: 2024  2:52 PM  2 Days Post-Op     Procedure:  Procedure(s) and Anesthesia Type:     * CABG X 4, WITH ENDOSCOPIC VEIN PROCUREMENT - General     * ECHOCARDIOGRAM, TRANSESOPHAGEAL, INTRAOPERATIVE - General    Vitals:  Vitals:    24 0745 24 0800 24 0815 24 0830   BP:  112/70     Pulse: 73 73 74 75   Resp: (!) 11 (!) 11 (!) 10 16   Temp:       TempSrc:       SpO2:       Weight:       Height:          Temp (24hrs), Av.5 °C (97.7 °F), Min:36.1 °C (96.9 °F), Max:37.4 °C (99.3 °F)      Respiratory:    Respiration: 16, Pulse Oximetry: 97 %       Fluids:    Intake/Output Summary (Last 24 hours) at 2024 0832  Last data filed at 2024 0733  Gross per 24 hour   Intake 1077.15 ml   Output 1210 ml   Net -132.85 ml     Admit weight: Weight: 63.1 kg (139 lb 1.8 oz)  Current weight: Weight: 70.4 kg (155 lb 3.3 oz) (24 0550)    Labs:  Recent Labs     24  0400 24  1250 24  0355 24  0012   WBC 9.6  --  15.8* 18.8*   RBC 5.39  --  4.15* 4.08*   HEMOGLOBIN 15.8 13.6* 12.6* 12.4*   HEMATOCRIT 46.8 40.5* 36.9* 37.6*   MCV 86.8  --  88.9 92.2   MCH 29.3  --  30.4 30.4   MCHC 33.8  --  34.1 33.0   RDW 41.7  --  44.2 46.5   PLATELETCT 261 135* 134* 119*   MPV 10.4  --  10.6 10.6     Recent Labs     24  0400 24  1250 24  2300 24  0355 24  0012   SODIUM 140  --   --  140 137   POTASSIUM 4.3   < > 3.7 3.9 5.1   CHLORIDE 107  --   --  109 104   CO2 19*  --   --  23 24   GLUCOSE 101*  --   --  142* 122*   BUN 14  --   --  9 13   CREATININE 0.88  --   --  0.69 1.04   CALCIUM 9.0  --   --  6.6* 8.4*    < > = values in this interval not displayed.     Recent Labs     24  1250   APTT 61.0* 35.0   INR 1.02 1.65*       HgbA1c:  6    Diabetic Educator Consult:  no    Medications:  Scheduled Medications   Medication Dose Frequency     guaiFENesin ER  600 mg Q12HRS    furosemide  40 mg BID DIURETIC    insulin lispro  2-9 Units 4X/DAY ACHS    potassium chloride SA  20 mEq BID    enoxaparin (LOVENOX) injection  40 mg DAILY AT 1800    Nozin nasal  swab  1 Applicator BID    acetaminophen  1,000 mg Q6HRS    senna-docusate  2 Tablet BID    And    polyethylene glycol/lytes  1 Packet DAILY    And    magnesium hydroxide  30 mL DAILY    omeprazole  20 mg DAILY    aspirin  81 mg DAILY    clopidogrel  75 mg DAILY    atorvastatin  40 mg QHS        Exam:   Physical Exam  Vitals and nursing note reviewed.   Constitutional:       General: He is not in acute distress.     Appearance: Normal appearance.   HENT:      Head: Normocephalic and atraumatic.      Right Ear: External ear normal.      Left Ear: External ear normal.      Nose: Nose normal.      Mouth/Throat:      Mouth: Mucous membranes are moist.   Eyes:      Extraocular Movements: Extraocular movements intact.      Conjunctiva/sclera: Conjunctivae normal.      Pupils: Pupils are equal, round, and reactive to light.   Cardiovascular:      Rate and Rhythm: Normal rate and regular rhythm.      Pulses: Normal pulses.      Heart sounds: Normal heart sounds.   Pulmonary:      Effort: Pulmonary effort is normal.   Abdominal:      General: Abdomen is flat.      Palpations: Abdomen is soft.   Musculoskeletal:         General: Normal range of motion.      Cervical back: Normal range of motion.      Right lower leg: Edema present.      Left lower leg: Edema present.   Skin:     General: Skin is warm and dry.      Capillary Refill: Capillary refill takes less than 2 seconds.      Comments: Sternum- CDI  RSVG- ace wrap   Neurological:      General: No focal deficit present.      Mental Status: He is alert and oriented to person, place, and time. Mental status is at baseline.   Psychiatric:         Mood and Affect: Mood normal.         Behavior: Behavior normal.         Cardiac Medications:    ASA -  Yes    Plavix - Yes    Post-operative Beta Blockers - Yes    Ace/ARB- No; contraindicated because of Hypotension    Statin - Yes    Aldactone- No; contraindicated because of Hypotension    SGLT2i-  No contraindicated because of No; contraindicated because of Hypotension    Ejection Fraction:  25%    Telemetry:   9/26 SR/ST  9/27 SR    Assessment/Plan:  POD 1 Extubated.  On dobutamine 7.5 epi 0.02.  Mild dizziness.  Adequate BP.  Moderate output from mediastinals.  Cr 0.69 Hgb 13.6.  Plan: Keep mediastinal tubes and blackwell.  Wean dobutamine to 5 and recheck swan numbers following.  Wean epi.  Wean oxygen as tolerated.  Diurese when bp improved.  Replete calcium.    POD 2 HDS. SR. Off gtts.  Neuro intact.  Wounds CDI.  Abdomen soft nontender Cr 1.04 Hgb 12.4. Pacer wires removed.  UO marginal overnight.  Plan: Increase diuretics.  Keep blackwell for strict I/O.  Transfer to [facility].  Wean oxygen.  Start low dose BB.    Disposition:  TBD

## 2024-09-27 NOTE — PROGRESS NOTES
Critical Care Progress Note    Date of admission  9/22/2024    Chief Complaint  63 y.o. male who presented 9/22/2024 with past medical history hypertension, diabetes, and coronary artery disease who was admitted to the [facility] on the 24th with an NSTEMI.  Patient presented to an outside facility with chest pain, underwent CT chest without pulmonary embolism, had bilateral small effusions, and pulmonary edema.  He underwent an infectious workup had a negative RVP and then was transferred to a neighboring hospital here in [place] for further care.  At [facility] he underwent a cardiac evaluation and noted to have markedly elevated troponin with a TTE showing reduced ejection fraction (25%) and LVH and some hypokinesis along the septum, he was transferred here for cardiology evaluation and treated with antithrombotics and heparin.  At admission he underwent left heart catheterization showed multivessel coronary disease and was referred to cardiothoracic surgery for surgical revascularization.     Today was taken to the OR with Dr. Mark where he underwent 4v CABG. the operative case was relatively unremarkable.  The patient was hemodynamically stable throughout the case he received 650 of Cell Saver, 3 L of crystalloid, no blood transfusion, had 800 cc of urine output, and whereas his initial preop TTE showed a very low ejection fraction reportedly at 15%, he improved to an EF of around 25% and with improved systolic function.  Patient did require a milrinone bolus coming off of bypass per anesthesia.    Hospital Course  9/26-POD 1, titrating inotropes and vasopressors, replace Ca++, BiV dysfunction based on Giles/, titrating insulin gtt,   9/27-Mild cough, well supported, on NC< well appearing, diurese, ok to transfer out of ICU    Interval Problem Update  Reviewed last 24 hour events:  Some coughing this morning, requesting mucinex    Neuro:     CV:  HR 70s  -140    PAC:  CO/CI-2.7/1.6  PCWP  14    Resp:   4L NC  CXR stable basilar atx    I/O: -102  UOP: 670    Tmax: AF  Heme: WBC 18    Abx:   None    Micro:   NA    Endo: BG WTR    LDA: CVC/PAC ISATU Warren, CT x2  SUP: PPI  VTE: DAPT/enox  Diet: Cardiac      Review of Systems  Review of Systems   All other systems reviewed and are negative.       Vital Signs for last 24 hours   Temp:  [36.1 °C (96.9 °F)-36.8 °C (98.2 °F)] 36.8 °C (98.2 °F)  Pulse:  [69-85] 76  Resp:  [7-41] 14  BP: (108-140)/(57-74) 117/62  SpO2:  [92 %-99 %] 95 %    Hemodynamic parameters for last 24 hours       Respiratory Information for the last 24 hours       Physical Exam   Physical Exam  Constitutional:       General: He is not in acute distress.     Appearance: He is not ill-appearing.   HENT:      Head: Normocephalic.      Mouth/Throat:      Mouth: Mucous membranes are moist.   Eyes:      Pupils: Pupils are equal, round, and reactive to light.   Cardiovascular:      Rate and Rhythm: Regular rhythm.   Pulmonary:      Effort: Pulmonary effort is normal.      Breath sounds: No rales.   Abdominal:      Palpations: Abdomen is soft.   Musculoskeletal:      Cervical back: Neck supple.      Right lower leg: No edema.      Left lower leg: No edema.   Skin:     General: Skin is warm.      Capillary Refill: Capillary refill takes less than 2 seconds.   Neurological:      General: No focal deficit present.      Mental Status: He is oriented to person, place, and time. Mental status is at baseline.         Medications  Current Facility-Administered Medications   Medication Dose Route Frequency Provider Last Rate Last Admin    guaiFENesin ER (Mucinex) tablet 600 mg  600 mg Oral Q12HRS Jeff Villegas, A.P.R.N.   600 mg at 09/27/24 0908    furosemide (Lasix) injection 40 mg  40 mg Intravenous BID DIURETIC Jeff Villegas A.P.R.N.        carvedilol (Coreg) tablet 3.125 mg  3.125 mg Oral BID WITH MEALS Jeff Villegas A.P.R.N.        amLODIPine (Norvasc) tablet 5 mg  5 mg Oral Q DAY Jeff Villegas  A.P.R.N.   5 mg at 09/27/24 0942    insulin lispro (HumaLOG,AdmeLOG) subcutaneous injection  2-9 Units Subcutaneous 4X/DAY CHEN Gomez M.D.   2 Units at 09/26/24 2056    And    dextrose 50% (D50W) injection 25 g  25 g Intravenous Q15 MIN PRN Addy Gomez M.D.        potassium chloride SA (Kdur) tablet 20 mEq  20 mEq Oral BID TISHA Mcneill.P.R.N.   20 mEq at 09/27/24 0540    Respiratory Therapy Consult   Nebulization Continuous RT JAYNA McneillP.RKASH        electrolyte-A (Plasmalyte-A) infusion   Intravenous PRN JAYNA McneillP.R.N. 999 mL/hr at 09/25/24 1329 New Bag at 09/25/24 1329    enoxaparin (Lovenox) inj 40 mg  40 mg Subcutaneous DAILY AT 1800 TISHA Mcneill.P.R.N.        Nozin nasal  swab  1 Applicator Each Nostril BID JAYNA McneillP.R.N.   1 Applicator at 09/27/24 0858    acetaminophen (Tylenol) tablet 1,000 mg  1,000 mg Oral Q6HRS TISHA Mcneill.P.R.N.   1,000 mg at 09/27/24 1214    Followed by    [START ON 10/5/2024] acetaminophen (Tylenol) tablet 1,000 mg  1,000 mg Oral Q6HRS PRN TISHA Mcneill.P.R.N.        oxyCODONE immediate-release (Roxicodone) tablet 5 mg  5 mg Oral Q3HRS PRN TISHA Mcneill.P.R.N.   5 mg at 09/26/24 0221    Or    oxyCODONE immediate release (Roxicodone) tablet 10 mg  10 mg Oral Q3HRS PRN TISHA Mcneill.P.R.N.        Or    fentaNYL (Sublimaze) injection 50 mcg  50 mcg Intravenous Q3HRS PRN TISHA Mcneill.P.R.N.        traMADol (Ultram) 50 MG tablet 50 mg  50 mg Oral Q4HRS PRN JAYNA McneillP.R.N.        ondansetron (Zofran) syringe/vial injection 8 mg  8 mg Intravenous Q6HRS PRTISHA Calhoun.P.R.N.   8 mg at 09/26/24 0836    Or    prochlorperazine (Compazine) injection 10 mg  10 mg Intravenous Q6HRS PRJAYNA CalhounP.R.N.        senna-docusate (Pericolace Or Senokot S) 8.6-50 MG per tablet 2 Tablet  2 Tablet Oral BID TISHA Mcneill.P.R.N.   2 Tablet at 09/27/24 0539    And     polyethylene glycol/lytes (Miralax) Packet 1 Packet  1 Packet Oral DAILY TISHA Mcneill.P.R.N.   1 Packet at 09/27/24 0539    And    magnesium hydroxide (Milk Of Magnesia) suspension 30 mL  30 mL Oral DAILY TISHA Mcneill.P.R.N.   30 mL at 09/27/24 0539    And    bisacodyl (Dulcolax) suppository 10 mg  10 mg Rectal QDAY PRN Angie Rey A.P.R.N.        omeprazole (PriLOSEC) capsule 20 mg  20 mg Oral DAILY Angie Rey A.P.R.N.   20 mg at 09/27/24 0539    mag hydrox-al hydrox-simeth (Maalox Plus Es Or Mylanta Ds) suspension 30 mL  30 mL Oral Q4HRS PRN Angie Rey A.P.R.N.        diphenhydrAMINE (Benadryl) tablet/capsule 25 mg  25 mg Oral HS PRN - MR X 1 TISHA Mcneill.P.R.N.        aspirin EC tablet 81 mg  81 mg Oral DAILY Angie Rey A.P.R.N.   81 mg at 09/27/24 0540    clopidogrel (Plavix) tablet 75 mg  75 mg Oral DAILY Angie Rey A.P.R.N.   75 mg at 09/27/24 0540    atorvastatin (Lipitor) tablet 40 mg  40 mg Oral QHS Angie Rey A.P.R.N.   40 mg at 09/26/24 2049    nitroglycerin (Nitrostat) tablet 0.4 mg  0.4 mg Sublingual Q5 MIN PRN Jose Montague M.D.           Fluids    Intake/Output Summary (Last 24 hours) at 9/27/2024 1349  Last data filed at 9/27/2024 1300  Gross per 24 hour   Intake 1300 ml   Output 1590 ml   Net -290 ml       Laboratory  Recent Labs     09/25/24  1715 09/25/24  1905 09/25/24  2141   ISTATAPH 7.328* 7.335* 7.340*   ISTATAPCO2 41.3* 41.8* 33.0   ISTATAPO2 81 77 86   ISTATATCO2 23 24 19*   GYSBANM4FVQ 95 94 96   ISTATARTHCO3 21.7 22.3 17.8   ISTATARTBE -4 -3 -7*   ISTATTEMP 37.2 C 37.3 C 37.6 C   ISTATFIO2 50 40 40   ISTATSPEC Arterial Arterial Arterial   ISTATAPHTC 7.326* 7.330* 7.331*   CYNAHCMJ1XG 82 79 89*         Recent Labs     09/25/24  0400 09/25/24  1250 09/25/24  1711 09/25/24  2300 09/26/24  0355 09/27/24  0012   SODIUM 140  --   --   --  140 137   POTASSIUM 4.3 3.9   < > 3.7 3.9 5.1   CHLORIDE 107  --   --   --  109 104   CO2  19*  --   --   --  23 24   BUN 14  --   --   --  9 13   CREATININE 0.88  --   --   --  0.69 1.04   MAGNESIUM 1.9 2.8*  --   --   --   --    CALCIUM 9.0  --   --   --  6.6* 8.4*    < > = values in this interval not displayed.     Recent Labs     09/24/24 2015 09/25/24 0400 09/26/24 0355 09/27/24  0012   ALTSGPT 56*  --   --   --    ASTSGOT 36  --   --   --    ALKPHOSPHAT 69  --   --   --    TBILIRUBIN 0.6  --   --   --    GLUCOSE 127* 101* 142* 122*     Recent Labs     09/24/24 2015 09/25/24 0400 09/26/24 0355 09/27/24  0012   WBC 8.1 9.6 15.8* 18.8*   NEUTSPOLYS 60.80  --   --   --    LYMPHOCYTES 25.80  --   --   --    MONOCYTES 7.20  --   --   --    EOSINOPHILS 5.10  --   --   --    BASOPHILS 0.90  --   --   --    ASTSGOT 36  --   --   --    ALTSGPT 56*  --   --   --    ALKPHOSPHAT 69  --   --   --    TBILIRUBIN 0.6  --   --   --      Recent Labs     09/24/24 2015 09/25/24 0400 09/25/24  1250 09/26/24 0355 09/27/24  0012   RBC 5.57 5.39  --  4.15* 4.08*   HEMOGLOBIN 16.8 15.8 13.6* 12.6* 12.4*   HEMATOCRIT 49.3 46.8 40.5* 36.9* 37.6*   PLATELETCT 227 261 135* 134* 119*   PROTHROMBTM 13.5  --  19.6*  --   --    APTT 61.0*  --  35.0  --   --    INR 1.02  --  1.65*  --   --        Imaging  X-Ray:  I have personally reviewed the images and compared with prior images.    Assessment/Plan  S/P CABG (coronary artery bypass graft)  Assessment & Plan  9/25-CABG x 4, LIMA->LAD, RSVG-> Diag2, ramus and RPDA    CBP time 90 mins  Xclamp time72 mins  Epinephrine/norepinephrine for low cardiac output/hypotension as needed-actively titrating norepinephrine  Given depressed LV function likely will need some inotropy in the post-op period, given milrinone coming off CPB  Chest tubes per CVS  Pacer wires in back up mode, functioning, no pacing required intra-op  Post-op MELISSA LVEF 25%, RV function reported as normal     Coronary artery disease involving native coronary artery of native heart without angina pectoris- (present on  admission)      Admitted with NSTEMI   LHC done at [facility] 9/20 with multivessel disease  DAPT per CTS  Statin  BB/ACE when hemodynamics allow  Cardiac rehab when clinically appropriate      Encounter for Weaning from Mechanical Ventilation  Extubated     Breathing unlabored  ISS/PEP  Mobility  Wean 02 as able      Heart failure with reduced ejection fraction (HCC)  Assessment & Plan  Found to have severe hypokinesis globally with an EF of 25% without any thrombus seen  Clinically appears euvolemic  GDMT as tolerated post op  If renal function remains stable       I have performed a physical exam and reviewed and updated ROS and Plan today (9/27/2024). In review of yesterday's note (9/26/2024), there are no changes except as documented above.     Discussed patient condition and risk of morbidity and/or mortality with Family, RN, RT, Therapies, Pharmacy, Patient, and CVS

## 2024-09-27 NOTE — CARE PLAN
The patient is Watcher - Medium risk of patient condition declining or worsening    Shift Goals  Clinical Goals: MAP greater than 65, oxygen saturation greater than 90%  Patient Goals: rest, sit in chair  Family Goals: questions for CTS    Progress made toward(s) clinical / shift goals:    Problem: Pain Management  Goal: Pain level will decrease to patient's comfort goal  Outcome: Progressing  Note: Medication available per MAR.      Problem: Post Op Day 1 CABG/Heart Valve Replacement  Goal: Optimal care of the post op CABG/heart valve replacement Post Op Day 1  Outcome: Progressing  Intervention: EKG and CXR completed  Note: Completed.   Intervention: All valve patients: PT/INR daily  Note: NA  Intervention: Antibiotics are discontinued within 24 hours of anesthesia end time unless indication documented for continuation beyond 24 hours  Note: Completed.    Intervention: Daily weights in the morning  Note: Stand up scale in the AM.  Intervention: Up in chair for all meals  Note: Patient up to chair for meals. Patient requesting to stay in chair for comfort.   Intervention: Ambulate in am if stable. First ambulation 25 feet. Repeat x 3 as tolerated  Note: Patient ambulated 120 ft before bed. Ambulated 120 ft in the AM.   Intervention: Discontinue blackwell catheter unless documented reason for continuation  Note: CTS will assess in the AM.   Intervention: Assess surgical dressing and check provider orders for potential removal  Note: Dressings have been removed during day shift.   Intervention: Ensure referal to intensive cardiac rehab is ordered, and smoking cessation education if appropriate  Note: See CTS note.   Intervention: OHS trained RN to remove chest tubes if ordered by provider  Note: CTS will assess in the AM.   Intervention: IS q 1 hour while awake and record best IS volume  Note: Best .   Intervention: Knee high LARRY hose, on during the day, off at night  Note: LARRY hose taken off during walk and SCDs  placed while patient sleeping.   Intervention: Saline lock IV  Note: Completed.   Intervention: Transfer to [facility] status, begin VS q 4 hours  Note: CTS will assess in the AM.   Intervention: After 24th hour post-anesthesia end time, transition patient to Cardiac Surgery SQ Insulin Protocol  Note: SQ insulin in place with sliding scale.   Intervention: If patient is CABG or on home beta-blocker, start/resume beta-blocker on POD 1 or POD 2 or document contraindication  Note: Patient's blood pressure was low after surgery. CTS stopped beta-blockers until stable. CTS will reassess in the AM.

## 2024-09-27 NOTE — DISCHARGE PLANNING
Case Management Discharge Planning    Admission Date: 9/22/2024  GMLOS: 8.2  ALOS: 5    6-Clicks ADL Score: 21  6-Clicks Mobility Score: 18      Anticipated Discharge Dispo: Discharge Disposition: Discharged to home/self care (01)    DME Needed: No    Action(s) Taken: Chart review completed. Per provider, patient to be transferred to [facility]; has transfer back agreement with [facility] Completed: None    Medically Clear: No    Next Steps: CM to follow up with IDT regarding DCP needs/barriers    Barriers to Discharge: Medical clearance    Is the patient up for discharge tomorrow: No

## 2024-09-28 ENCOUNTER — APPOINTMENT (OUTPATIENT)
Dept: RADIOLOGY | Facility: MEDICAL CENTER | Age: 64
DRG: 235 | End: 2024-09-28
Attending: NURSE PRACTITIONER
Payer: COMMERCIAL

## 2024-09-28 LAB
ANION GAP SERPL CALC-SCNC: 3 MMOL/L (ref 7–16)
BUN SERPL-MCNC: 19 MG/DL (ref 8–22)
CALCIUM SERPL-MCNC: 7.9 MG/DL (ref 8.5–10.5)
CHLORIDE SERPL-SCNC: 103 MMOL/L (ref 96–112)
CO2 SERPL-SCNC: 29 MMOL/L (ref 20–33)
CREAT SERPL-MCNC: 0.98 MG/DL (ref 0.5–1.4)
ERYTHROCYTE [DISTWIDTH] IN BLOOD BY AUTOMATED COUNT: 45 FL (ref 35.9–50)
GFR SERPLBLD CREATININE-BSD FMLA CKD-EPI: 87 ML/MIN/1.73 M 2
GLUCOSE BLD STRIP.AUTO-MCNC: 104 MG/DL (ref 65–99)
GLUCOSE BLD STRIP.AUTO-MCNC: 104 MG/DL (ref 65–99)
GLUCOSE BLD STRIP.AUTO-MCNC: 108 MG/DL (ref 65–99)
GLUCOSE SERPL-MCNC: 132 MG/DL (ref 65–99)
HCT VFR BLD AUTO: 33.5 % (ref 42–52)
HGB BLD-MCNC: 10.8 G/DL (ref 14–18)
MCH RBC QN AUTO: 29.3 PG (ref 27–33)
MCHC RBC AUTO-ENTMCNC: 32.2 G/DL (ref 32.3–36.5)
MCV RBC AUTO: 90.8 FL (ref 81.4–97.8)
PLATELET # BLD AUTO: 136 K/UL (ref 164–446)
PMV BLD AUTO: 11.2 FL (ref 9–12.9)
POTASSIUM SERPL-SCNC: 4.5 MMOL/L (ref 3.6–5.5)
RBC # BLD AUTO: 3.69 M/UL (ref 4.7–6.1)
SODIUM SERPL-SCNC: 135 MMOL/L (ref 135–145)
WBC # BLD AUTO: 13.7 K/UL (ref 4.8–10.8)

## 2024-09-28 PROCEDURE — 770020 HCHG ROOM/CARE - TELE (206)

## 2024-09-28 PROCEDURE — 94669 MECHANICAL CHEST WALL OSCILL: CPT

## 2024-09-28 PROCEDURE — 700102 HCHG RX REV CODE 250 W/ 637 OVERRIDE(OP): Performed by: NURSE PRACTITIONER

## 2024-09-28 PROCEDURE — 80048 BASIC METABOLIC PNL TOTAL CA: CPT

## 2024-09-28 PROCEDURE — A9270 NON-COVERED ITEM OR SERVICE: HCPCS | Performed by: NURSE PRACTITIONER

## 2024-09-28 PROCEDURE — 51798 US URINE CAPACITY MEASURE: CPT

## 2024-09-28 PROCEDURE — 700111 HCHG RX REV CODE 636 W/ 250 OVERRIDE (IP): Mod: JZ | Performed by: NURSE PRACTITIONER

## 2024-09-28 PROCEDURE — 85027 COMPLETE CBC AUTOMATED: CPT

## 2024-09-28 PROCEDURE — 82962 GLUCOSE BLOOD TEST: CPT

## 2024-09-28 PROCEDURE — 71046 X-RAY EXAM CHEST 2 VIEWS: CPT

## 2024-09-28 RX ORDER — FUROSEMIDE 10 MG/ML
40 INJECTION INTRAMUSCULAR; INTRAVENOUS
Status: DISCONTINUED | OUTPATIENT
Start: 2024-09-29 | End: 2024-09-30 | Stop reason: HOSPADM

## 2024-09-28 RX ORDER — POTASSIUM CHLORIDE 1500 MG/1
20 TABLET, EXTENDED RELEASE ORAL DAILY
Status: DISCONTINUED | OUTPATIENT
Start: 2024-09-29 | End: 2024-09-30 | Stop reason: HOSPADM

## 2024-09-28 RX ADMIN — ACETAMINOPHEN 1000 MG: 500 TABLET ORAL at 12:15

## 2024-09-28 RX ADMIN — OMEPRAZOLE 20 MG: 20 CAPSULE, DELAYED RELEASE ORAL at 05:25

## 2024-09-28 RX ADMIN — Medication 1 APPLICATOR: at 09:10

## 2024-09-28 RX ADMIN — CARVEDILOL 3.12 MG: 3.12 TABLET, FILM COATED ORAL at 17:53

## 2024-09-28 RX ADMIN — FUROSEMIDE 40 MG: 10 INJECTION, SOLUTION INTRAVENOUS at 05:24

## 2024-09-28 RX ADMIN — ACETAMINOPHEN 1000 MG: 500 TABLET ORAL at 17:53

## 2024-09-28 RX ADMIN — Medication 1 APPLICATOR: at 21:30

## 2024-09-28 RX ADMIN — AMLODIPINE BESYLATE 5 MG: 10 TABLET ORAL at 05:25

## 2024-09-28 RX ADMIN — CLOPIDOGREL BISULFATE 75 MG: 75 TABLET ORAL at 05:25

## 2024-09-28 RX ADMIN — TRAMADOL HYDROCHLORIDE 50 MG: 50 TABLET ORAL at 21:30

## 2024-09-28 RX ADMIN — ATORVASTATIN CALCIUM 40 MG: 40 TABLET, FILM COATED ORAL at 21:30

## 2024-09-28 RX ADMIN — GUAIFENESIN 600 MG: 600 TABLET, EXTENDED RELEASE ORAL at 17:53

## 2024-09-28 RX ADMIN — ACETAMINOPHEN 1000 MG: 500 TABLET ORAL at 05:25

## 2024-09-28 RX ADMIN — POTASSIUM CHLORIDE 20 MEQ: 1500 TABLET, EXTENDED RELEASE ORAL at 05:25

## 2024-09-28 RX ADMIN — ENOXAPARIN SODIUM 40 MG: 100 INJECTION SUBCUTANEOUS at 17:54

## 2024-09-28 RX ADMIN — CARVEDILOL 3.12 MG: 3.12 TABLET, FILM COATED ORAL at 09:07

## 2024-09-28 RX ADMIN — ASPIRIN 81 MG: 81 TABLET, COATED ORAL at 05:25

## 2024-09-28 RX ADMIN — GUAIFENESIN 600 MG: 600 TABLET, EXTENDED RELEASE ORAL at 05:25

## 2024-09-28 ASSESSMENT — COPD QUESTIONNAIRES
COPD SCREENING SCORE: 4
DURING THE PAST 4 WEEKS HOW MUCH DID YOU FEEL SHORT OF BREATH: NONE/LITTLE OF THE TIME
HAVE YOU SMOKED AT LEAST 100 CIGARETTES IN YOUR ENTIRE LIFE: YES
DO YOU EVER COUGH UP ANY MUCUS OR PHLEGM?: NO/ONLY WITH OCCASIONAL COLDS OR INFECTIONS

## 2024-09-28 ASSESSMENT — PAIN DESCRIPTION - PAIN TYPE
TYPE: ACUTE PAIN

## 2024-09-28 ASSESSMENT — FIBROSIS 4 INDEX: FIB4 SCORE: 2.16

## 2024-09-28 NOTE — PROGRESS NOTES
4 Eyes Skin Assessment Completed by MARICEL Carrero and MARICEL Ward.    Head WDL  Ears Redness and Blanching  Nose WDL  Mouth WDL  Neck Right central line  Breast/Chest Midline incision  Shoulder Blades WDL  Spine WDL  (R) Arm/Elbow/Hand WDL  (L) Arm/Elbow/Hand WDL  Abdomen Chest tube sites  Groin Incision  Scrotum/Coccyx/Buttocks Redness, Blanching, and Scab  (R) Leg Bruising, Swelling, and Incision  (L) Leg Swelling  (R) Heel/Foot/Toe Redness, Blanching, and Swelling  (L) Heel/Foot/Toe Redness, Blanching, and Swelling          Devices In Places Tele Box, Blood Pressure Cuff, Pulse Ox, Warren, Nasal Cannula, and LARRY's      Interventions In Place Gray Ear Foams, NC W/Ear Foams, Pillows, Heels Loaded W/Pillows, and Pressure Redistribution Mattress    Possible Skin Injury No    Pictures Uploaded Into Epic N/A  Wound Consult Placed N/A  RN Wound Prevention Protocol Ordered No

## 2024-09-28 NOTE — CARE PLAN
The patient is Watcher - Medium risk of patient condition declining or worsening    Shift Goals  Clinical Goals: MAP greater than 65, oxygen saturation greater than 90%  Patient Goals: sleep  Family Goals: ask about bruising with CTS    Progress made toward(s) clinical / shift goals:    Problem: Pain - Standard  Goal: Alleviation of pain or a reduction in pain to the patient’s comfort goal  Outcome: Progressing  Note: Medication available per MAR.     Problem: Post op day 2 CABG/Heart Valve Replacement  Goal: Optimal care of the post op CABG/heart valve replacement post op day 2  Outcome: Progressing  Intervention: Daily weights in the morning  Note: In the AM.  Intervention: Up in chair for all meals  Note: Up to chair for meals. Chair is the patient's preference.   Intervention: FSBS: when 2 consecutive BS < 130 after post op day 2, discontinue FSBS unless patient is insulin dependent diabetic  Note: Sliding scale utilized.   Intervention: Daily weights in the morning  Note: Occur in the AM.   Intervention: Up in chair for all meals  Note: Up to chair for meals. Chair is the patient's preference.   Intervention: Ambulate 4 times daily, increasing the distance each time  Note: Ambulated 220 ft before bed. Ambulated 220 ft in the AM.  Intervention: Stand at sink and wash up with assistance.  Clean incisions twice daily with soap and water.  Note: Foam soap and wash cloth utilized.   Intervention: IS q 1 hour while awake and record best IS volume  Note: 500 best  Intervention: Consider pacer wire removal by MD  Note: Removed during day shift.   Intervention: Consider removal of blackwell and chest tube if not already done  Note: Chest tubes removed during day shift. Blackwell in place per CTS.

## 2024-09-28 NOTE — PROGRESS NOTES
CCM will sign off for now, please reach out if condition changes or if there is any questions we can assist with.

## 2024-09-28 NOTE — CARE PLAN
Problem: Hyperinflation  Goal: Prevent or improve atelectasis  Description: Target End Date:  3 to 4 days    1. Instruct incentive spirometry usage  2.  Perform hyperinflation therapy as indicated  Outcome: Progressing  Flowsheets (Taken 9/25/2024 2157 by Shasha Sanchez, HUDSON)  Hyperinflation Protocol Goals/Outcome: Increase and/or stable inspiratory capacity for 24 hours  Hyperinflation Protocol Indications: Abdominal/Thoracic Surgeries (Open Heart)        Respiratory Update    Treatment modality: PEP + IS QID  IS: 800    Pt tolerating current treatments well with no adverse reactions.

## 2024-09-28 NOTE — PROGRESS NOTES
Cardiovascular Surgery Progress Note    Name: Gold Bustamante  MRN: [id number]  : 1960  Admit Date: 2024  2:52 PM  3 Days Post-Op     Procedure:  Procedure(s) and Anesthesia Type:     * CABG X 4, WITH ENDOSCOPIC VEIN PROCUREMENT - General     * ECHOCARDIOGRAM, TRANSESOPHAGEAL, INTRAOPERATIVE - General    Vitals:  Vitals:    24 0200 24 0400 24 0500 24 0520   BP: 109/56 119/62 100/57 112/66   Pulse: 72 78 76 71   Resp: (!) 8 15 12 (!) 7   Temp:  36.3 °C (97.3 °F)     TempSrc:  Temporal     SpO2: 95% 95% 93% 95%   Weight:       Height:          Temp (24hrs), Av.7 °C (98.1 °F), Min:36.3 °C (97.3 °F), Max:37 °C (98.6 °F)      Respiratory:    Respiration: (!) 7, Pulse Oximetry: 95 %       Fluids:    Intake/Output Summary (Last 24 hours) at 2024 0633  Last data filed at 2024 0400  Gross per 24 hour   Intake 980 ml   Output 3505 ml   Net -2525 ml     Admit weight: Weight: 63.1 kg (139 lb 1.8 oz)  Current weight: Weight: 70.4 kg (155 lb 3.3 oz) (24 0550)    Labs:  Recent Labs     24  0355 24  0012 24  0000   WBC 15.8* 18.8* 13.7*   RBC 4.15* 4.08* 3.69*   HEMOGLOBIN 12.6* 12.4* 10.8*   HEMATOCRIT 36.9* 37.6* 33.5*   MCV 88.9 92.2 90.8   MCH 30.4 30.4 29.3   MCHC 34.1 33.0 32.2*   RDW 44.2 46.5 45.0   PLATELETCT 134* 119* 136*   MPV 10.6 10.6 11.2     Recent Labs     24  0355 24  0012 24  0000   SODIUM 140 137 135   POTASSIUM 3.9 5.1 4.5   CHLORIDE 109 104 103   CO2 23 24 29   GLUCOSE 142* 122* 132*   BUN 9 13 19   CREATININE 0.69 1.04 0.98   CALCIUM 6.6* 8.4* 7.9*     Recent Labs     24  1250   APTT 35.0   INR 1.65*       HgbA1c:  6    Diabetic Educator Consult:  no    Medications:  Scheduled Medications   Medication Dose Frequency    [START ON 2024] furosemide  40 mg Q DAY    [START ON 2024] potassium chloride SA  20 mEq DAILY    guaiFENesin ER  600 mg Q12HRS    carvedilol  3.125 mg BID WITH MEALS    amLODIPine  5 mg Q DAY     insulin lispro  2-9 Units 4X/DAY ACHS    enoxaparin (LOVENOX) injection  40 mg DAILY AT 1800    Nozin nasal  swab  1 Applicator BID    acetaminophen  1,000 mg Q6HRS    senna-docusate  2 Tablet BID    And    polyethylene glycol/lytes  1 Packet DAILY    And    magnesium hydroxide  30 mL DAILY    omeprazole  20 mg DAILY    aspirin  81 mg DAILY    clopidogrel  75 mg DAILY    atorvastatin  40 mg QHS        Exam:   Physical Exam  Vitals and nursing note reviewed.   Constitutional:       General: He is not in acute distress.     Appearance: Normal appearance.   HENT:      Head: Normocephalic and atraumatic.      Right Ear: External ear normal.      Left Ear: External ear normal.      Nose: Nose normal.      Mouth/Throat:      Mouth: Mucous membranes are moist.   Eyes:      Extraocular Movements: Extraocular movements intact.      Conjunctiva/sclera: Conjunctivae normal.      Pupils: Pupils are equal, round, and reactive to light.   Cardiovascular:      Rate and Rhythm: Normal rate and regular rhythm.      Pulses: Normal pulses.      Heart sounds: Normal heart sounds.   Pulmonary:      Effort: Pulmonary effort is normal.   Abdominal:      General: Abdomen is flat.      Palpations: Abdomen is soft.   Musculoskeletal:         General: Normal range of motion.      Cervical back: Normal range of motion.      Right lower leg: Edema present.      Left lower leg: Edema present.   Skin:     General: Skin is warm and dry.      Capillary Refill: Capillary refill takes less than 2 seconds.      Comments: Sternum- CDI  RSVG- ace wrap   Neurological:      General: No focal deficit present.      Mental Status: He is alert and oriented to person, place, and time. Mental status is at baseline.   Psychiatric:         Mood and Affect: Mood normal.         Behavior: Behavior normal.         Cardiac Medications:    ASA - Yes    Plavix - Yes    Post-operative Beta Blockers - Yes    Ace/ARB- No; contraindicated because of  Hypotension    Statin - Yes    Aldactone- No; contraindicated because of Hypotension    SGLT2i-  No contraindicated because of No; contraindicated because of Hypotension    Ejection Fraction:  25%    Telemetry:   9/26 SR/ST  9/27 SR  9/28 SR    Assessment/Plan:  POD 1 Extubated.  On dobutamine 7.5 epi 0.02.  Mild dizziness.  Adequate BP.  Moderate output from mediastinals.  Cr 0.69 Hgb 13.6.  Plan: Keep mediastinal tubes and blackwell.  Wean dobutamine to 5 and recheck swan numbers following.  Wean epi.  Wean oxygen as tolerated.  Diurese when bp improved.  Replete calcium.    POD 2 HDS. SR. Off gtts.  Neuro intact.  Wounds CDI.  Abdomen soft nontender Cr 1.04 Hgb 12.4. Pacer wires removed.  UO marginal overnight.  Plan: Increase diuretics.  Keep blackwell for strict I/O.  Transfer to tele.  Wean oxygen.  Start low dose BB.    POD 3 HDS, HFrEF start GDMT when BP allows, SR, diuresed well- decrease to daily, incision c/di, amb/enc IS, awaiting bed on on tele    Disposition:  OT- home

## 2024-09-29 ENCOUNTER — APPOINTMENT (OUTPATIENT)
Dept: RADIOLOGY | Facility: MEDICAL CENTER | Age: 64
DRG: 235 | End: 2024-09-29
Attending: THORACIC SURGERY (CARDIOTHORACIC VASCULAR SURGERY)
Payer: COMMERCIAL

## 2024-09-29 LAB
ANION GAP SERPL CALC-SCNC: 9 MMOL/L (ref 7–16)
BUN SERPL-MCNC: 16 MG/DL (ref 8–22)
CALCIUM SERPL-MCNC: 8 MG/DL (ref 8.5–10.5)
CHLORIDE SERPL-SCNC: 99 MMOL/L (ref 96–112)
CO2 SERPL-SCNC: 27 MMOL/L (ref 20–33)
CREAT SERPL-MCNC: 0.82 MG/DL (ref 0.5–1.4)
ERYTHROCYTE [DISTWIDTH] IN BLOOD BY AUTOMATED COUNT: 44 FL (ref 35.9–50)
GFR SERPLBLD CREATININE-BSD FMLA CKD-EPI: 99 ML/MIN/1.73 M 2
GLUCOSE SERPL-MCNC: 98 MG/DL (ref 65–99)
HCT VFR BLD AUTO: 31.2 % (ref 42–52)
HGB BLD-MCNC: 10.6 G/DL (ref 14–18)
MCH RBC QN AUTO: 30.5 PG (ref 27–33)
MCHC RBC AUTO-ENTMCNC: 34 G/DL (ref 32.3–36.5)
MCV RBC AUTO: 89.7 FL (ref 81.4–97.8)
PLATELET # BLD AUTO: 160 K/UL (ref 164–446)
PMV BLD AUTO: 10.4 FL (ref 9–12.9)
POTASSIUM SERPL-SCNC: 3.8 MMOL/L (ref 3.6–5.5)
RBC # BLD AUTO: 3.48 M/UL (ref 4.7–6.1)
SODIUM SERPL-SCNC: 135 MMOL/L (ref 135–145)
WBC # BLD AUTO: 10.5 K/UL (ref 4.8–10.8)

## 2024-09-29 PROCEDURE — 71046 X-RAY EXAM CHEST 2 VIEWS: CPT

## 2024-09-29 PROCEDURE — 700111 HCHG RX REV CODE 636 W/ 250 OVERRIDE (IP): Mod: JZ | Performed by: NURSE PRACTITIONER

## 2024-09-29 PROCEDURE — 80048 BASIC METABOLIC PNL TOTAL CA: CPT

## 2024-09-29 PROCEDURE — 700102 HCHG RX REV CODE 250 W/ 637 OVERRIDE(OP): Performed by: NURSE PRACTITIONER

## 2024-09-29 PROCEDURE — A9270 NON-COVERED ITEM OR SERVICE: HCPCS | Performed by: NURSE PRACTITIONER

## 2024-09-29 PROCEDURE — 94669 MECHANICAL CHEST WALL OSCILL: CPT

## 2024-09-29 PROCEDURE — 85027 COMPLETE CBC AUTOMATED: CPT

## 2024-09-29 PROCEDURE — 770020 HCHG ROOM/CARE - TELE (206)

## 2024-09-29 RX ORDER — SPIRONOLACTONE 25 MG/1
25 TABLET ORAL
Status: DISCONTINUED | OUTPATIENT
Start: 2024-09-29 | End: 2024-09-30 | Stop reason: HOSPADM

## 2024-09-29 RX ORDER — LOSARTAN POTASSIUM 25 MG/1
25 TABLET ORAL
Status: DISCONTINUED | OUTPATIENT
Start: 2024-09-29 | End: 2024-09-30 | Stop reason: HOSPADM

## 2024-09-29 RX ORDER — DAPAGLIFLOZIN 10 MG/1
10 TABLET, FILM COATED ORAL DAILY
Status: DISCONTINUED | OUTPATIENT
Start: 2024-09-30 | End: 2024-09-30 | Stop reason: HOSPADM

## 2024-09-29 RX ADMIN — CARVEDILOL 3.12 MG: 3.12 TABLET, FILM COATED ORAL at 17:26

## 2024-09-29 RX ADMIN — Medication 1 APPLICATOR: at 08:04

## 2024-09-29 RX ADMIN — LOSARTAN POTASSIUM 25 MG: 25 TABLET, FILM COATED ORAL at 08:04

## 2024-09-29 RX ADMIN — ACETAMINOPHEN 1000 MG: 500 TABLET ORAL at 12:24

## 2024-09-29 RX ADMIN — ACETAMINOPHEN 1000 MG: 500 TABLET ORAL at 17:26

## 2024-09-29 RX ADMIN — CLOPIDOGREL BISULFATE 75 MG: 75 TABLET ORAL at 05:59

## 2024-09-29 RX ADMIN — GUAIFENESIN 600 MG: 600 TABLET, EXTENDED RELEASE ORAL at 17:25

## 2024-09-29 RX ADMIN — FUROSEMIDE 40 MG: 10 INJECTION, SOLUTION INTRAVENOUS at 05:59

## 2024-09-29 RX ADMIN — ACETAMINOPHEN 1000 MG: 500 TABLET ORAL at 00:06

## 2024-09-29 RX ADMIN — GUAIFENESIN 600 MG: 600 TABLET, EXTENDED RELEASE ORAL at 05:59

## 2024-09-29 RX ADMIN — ATORVASTATIN CALCIUM 40 MG: 40 TABLET, FILM COATED ORAL at 20:55

## 2024-09-29 RX ADMIN — CARVEDILOL 3.12 MG: 3.12 TABLET, FILM COATED ORAL at 08:04

## 2024-09-29 RX ADMIN — Medication 1 APPLICATOR: at 20:55

## 2024-09-29 RX ADMIN — OMEPRAZOLE 20 MG: 20 CAPSULE, DELAYED RELEASE ORAL at 05:59

## 2024-09-29 RX ADMIN — ACETAMINOPHEN 1000 MG: 500 TABLET ORAL at 05:59

## 2024-09-29 RX ADMIN — SENNOSIDES AND DOCUSATE SODIUM 2 TABLET: 50; 8.6 TABLET ORAL at 17:25

## 2024-09-29 RX ADMIN — ASPIRIN 81 MG: 81 TABLET, COATED ORAL at 05:59

## 2024-09-29 RX ADMIN — AMLODIPINE BESYLATE 5 MG: 10 TABLET ORAL at 05:59

## 2024-09-29 RX ADMIN — POTASSIUM CHLORIDE 20 MEQ: 1500 TABLET, EXTENDED RELEASE ORAL at 05:59

## 2024-09-29 RX ADMIN — ENOXAPARIN SODIUM 40 MG: 100 INJECTION SUBCUTANEOUS at 17:26

## 2024-09-29 RX ADMIN — SPIRONOLACTONE 25 MG: 25 TABLET ORAL at 08:03

## 2024-09-29 SDOH — ECONOMIC STABILITY: TRANSPORTATION INSECURITY
IN THE PAST 12 MONTHS, HAS LACK OF RELIABLE TRANSPORTATION KEPT YOU FROM MEDICAL APPOINTMENTS, MEETINGS, WORK OR FROM GETTING THINGS NEEDED FOR DAILY LIVING?: NO

## 2024-09-29 SDOH — ECONOMIC STABILITY: TRANSPORTATION INSECURITY
IN THE PAST 12 MONTHS, HAS THE LACK OF TRANSPORTATION KEPT YOU FROM MEDICAL APPOINTMENTS OR FROM GETTING MEDICATIONS?: NO

## 2024-09-29 ASSESSMENT — COGNITIVE AND FUNCTIONAL STATUS - GENERAL
MOBILITY SCORE: 24
HELP NEEDED FOR BATHING: A LITTLE
DRESSING REGULAR LOWER BODY CLOTHING: A LITTLE
PERSONAL GROOMING: A LITTLE
SUGGESTED CMS G CODE MODIFIER MOBILITY: CH
DRESSING REGULAR UPPER BODY CLOTHING: A LITTLE
SUGGESTED CMS G CODE MODIFIER DAILY ACTIVITY: CJ
DAILY ACTIVITIY SCORE: 20

## 2024-09-29 ASSESSMENT — PAIN DESCRIPTION - PAIN TYPE: TYPE: SURGICAL PAIN

## 2024-09-29 ASSESSMENT — SOCIAL DETERMINANTS OF HEALTH (SDOH)
IN THE PAST 12 MONTHS, HAS THE ELECTRIC, GAS, OIL, OR WATER COMPANY THREATENED TO SHUT OFF SERVICE IN YOUR HOME?: NO
WITHIN THE PAST 12 MONTHS, THE FOOD YOU BOUGHT JUST DIDN'T LAST AND YOU DIDN'T HAVE MONEY TO GET MORE: NEVER TRUE
WITHIN THE PAST 12 MONTHS, YOU WORRIED THAT YOUR FOOD WOULD RUN OUT BEFORE YOU GOT THE MONEY TO BUY MORE: NEVER TRUE

## 2024-09-29 ASSESSMENT — FIBROSIS 4 INDEX: FIB4 SCORE: 1.83

## 2024-09-29 NOTE — PROGRESS NOTES
Bedside report received from off going RN/tech: sandi assumed care of patient.     Fall Risk Score: LOW RISK  Fall risk interventions in place: Place yellow fall risk ID band on patient, Provide patient/family education based on risk assessment, Educate patient/family to call staff for assistance when getting out of bed, Place fall precaution signage outside patient door, Place patient in room close to nursing station, Utilize bed/chair fall alarm, and Bed alarm connected correctly  Bed type: Regular (Andrei Score less than 17 interventions in place)  Patient on cardiac monitor: Yes  IVF/IV medications: Not Applicable   Oxygen: Room Air  Bedside sitter: Not Applicable   Isolation: Not applicable

## 2024-09-29 NOTE — CARE PLAN
Problem: Knowledge Deficit - Standard  Goal: Patient and family/care givers will demonstrate understanding of plan of care, disease process/condition, diagnostic tests and medications  Outcome: Progressing     Problem: Communication  Goal: The ability to communicate needs accurately and effectively will improve  Outcome: Progressing     Problem: Safety  Goal: Will remain free from injury  Outcome: Progressing     Problem: Urinary Elimination:  Goal: Ability to reestablish a normal urinary elimination pattern will improve  Outcome: Progressing  Note: Catheter removed at 1500 per protocol. Pt has not voided yet     Problem: Mobility  Goal: Risk for activity intolerance will decrease  Outcome: Progressing     Problem: Post Op Day 3 CABG/Heart Valve replacement  Goal: Optimal care of the post op CABG/Heart Valve replacement post op day 3  Outcome: Progressing  Note: Patient transferred from [facility] to T8. Patient daily weight taken with AM staff on CIC. Patient ambulated x2 with CIC staff. Patient showered on tele 8 with this RN. Pt educated on sternal incision care. Warren removed per protocol. Pt provided with urinal for I/O. Pt self motivated with IS, current value is 1000. Patient up in chair for dinner.   The patient is Watcher - Medium risk of patient condition declining or worsening    Shift Goals  Clinical Goals: Hemodynamic stability, wean O2  Patient Goals: Comfort, rest  Family Goals: Updates, rest    Progress made toward(s) clinical / shift goals:  Tx to tele from [facility]    Patient is not progressing towards the following goals:

## 2024-09-29 NOTE — PROGRESS NOTES
Bedside report received from off going RN: Adriana, assumed care of patient.     Fall Risk Score: HIGH RISK  Fall risk interventions in place: Place yellow fall risk ID band on patient, Provide patient/family education based on risk assessment, Educate patient/family to call staff for assistance when getting out of bed, Place fall precaution signage outside patient door, Place patient in room close to nursing station, Utilize bed/chair fall alarm, Notify charge of high risk for huddle, and Bed alarm connected correctly  Bed type: Regular (Andrei Score less than 17 interventions in place)  Patient on cardiac monitor: Yes  IVF/IV medications: Not Applicable   Oxygen: Room Air  Bedside sitter: Not Applicable   Isolation: Not applicable

## 2024-09-29 NOTE — CARE PLAN
Problem: Knowledge Deficit - Standard  Goal: Patient and family/care givers will demonstrate understanding of plan of care, disease process/condition, diagnostic tests and medications  Outcome: Progressing     Problem: Safety  Goal: Will remain free from injury  Outcome: Progressing  Goal: Will remain free from falls  Outcome: Progressing     Problem: Pain Management  Goal: Pain level will decrease to patient's comfort goal  Outcome: Progressing     Problem: Infection  Goal: Will remain free from infection  Outcome: Progressing     Problem: Fluid Volume:  Goal: Will maintain balanced intake and output  Outcome: Progressing     Problem: Skin Integrity  Goal: Risk for impaired skin integrity will decrease  Outcome: Progressing     Problem: Mobility  Goal: Risk for activity intolerance will decrease  Outcome: Progressing     Problem: Knowledge Deficit  Goal: Knowledge of disease process/condition, treatment plan, diagnostic tests, and medications will improve  Outcome: Progressing  Goal: Knowledge of the prescribed therapeutic regimen will improve  Outcome: Progressing     Problem: Post Op Day 4 CABG/Heart Valve Replacement  Goal: Optimal care of the Post Op CABG/Heart Valve replacement Post Op Day 4  Outcome: Progressing     Problem: Self Care  Goal: Patient will have the ability to perform ADLs independently or with assistance (bathe, groom, dress, toilet and feed)  Outcome: Progressing   The patient is Watcher - Medium risk of patient condition declining or worsening    Shift Goals  Clinical Goals: Monitor VS and labs; wean O2; safe ambulation and wound care  Patient Goals: get well and go home soon  Family Goals: at bedside    Progress made toward(s) clinical / shift goals:   discussed plan of care and patient verbalized understanding, ambulated in the hallway and tolerated fairly well, patient tolerating room air, pain was managed with scheduled medication, call light within reach and bed alarm kept on,/65  "  Pulse 71   Temp 36.7 °C (98.1 °F) (Temporal)   Resp 18   Ht 1.651 m (5' 5\")   Wt 66.5 kg (146 lb 9.7 oz)   SpO2 94%           "

## 2024-09-29 NOTE — CARE PLAN
The patient is Stable - Low risk of patient condition declining or worsening    Shift Goals  Clinical Goals: Hemodynamic stability, increase mobilization, monitor VS/Labs, improve oxygenation  Patient Goals: get better, breathe easier and walk farther  Family Goals: comfort, rest, get better    Progress made toward(s) clinical / shift goals:    Problem: Knowledge Deficit - Standard  Goal: Patient and family/care givers will demonstrate understanding of plan of care, disease process/condition, diagnostic tests and medications  Outcome: Progressing     Problem: Post Op Day 3 CABG/Heart Valve replacement  Goal: Optimal care of the post op CABG/Heart Valve replacement post op day 3  Outcome: Progressing  Note: Patient pulling 1100 on IS, self motivated. Patient now able to walk 100 feet w/o walker. Pain well controlled. Edema reduced to 1+ in BLE. Lung sounds clear. Currently on 0.5L O2, while sleeping. Desatting to 87% on RA while asleep. Warren removed, pt now urinated w/o assistance. Patient received shower, CHG bath, and incision care x2. Incisions, harvest sites, and chest tube sites open to air with no drainage, C/D/I. Will walk one more time this shift and obtain AM weight.        Patient is not progressing towards the following goals:

## 2024-09-29 NOTE — PROGRESS NOTES
Cardiovascular Surgery Progress Note    Name: Gold Bustamante  MRN: [id number]  : 1960  Admit Date: 2024  2:52 PM  4 Days Post-Op     Procedure:  Procedure(s) and Anesthesia Type:     * CABG X 4, WITH ENDOSCOPIC VEIN PROCUREMENT - General     * ECHOCARDIOGRAM, TRANSESOPHAGEAL, INTRAOPERATIVE - General    Vitals:  Vitals:    24 0306 24 0559 24 0600 24 0708   BP: 132/70 125/70     Pulse: 72      Resp: 16      Temp: 36.8 °C (98.2 °F)      TempSrc: Temporal      SpO2: 95%   91%   Weight:   66.5 kg (146 lb 9.7 oz)    Height:          Temp (24hrs), Av.9 °C (98.5 °F), Min:36.8 °C (98.2 °F), Max:37.1 °C (98.8 °F)      Respiratory:    Respiration: 16, Pulse Oximetry: 91 %       Fluids:    Intake/Output Summary (Last 24 hours) at 2024 0731  Last data filed at 2024 0600  Gross per 24 hour   Intake 720 ml   Output 1630 ml   Net -910 ml     Admit weight: Weight: 63.1 kg (139 lb 1.8 oz)  Current weight: Weight: 66.5 kg (146 lb 9.7 oz) (24 0600)    Labs:  Recent Labs     24  0012 24  0000 24  0016   WBC 18.8* 13.7* 10.5   RBC 4.08* 3.69* 3.48*   HEMOGLOBIN 12.4* 10.8* 10.6*   HEMATOCRIT 37.6* 33.5* 31.2*   MCV 92.2 90.8 89.7   MCH 30.4 29.3 30.5   MCHC 33.0 32.2* 34.0   RDW 46.5 45.0 44.0   PLATELETCT 119* 136* 160*   MPV 10.6 11.2 10.4     Recent Labs     24  0012 24  0000 24  0016   SODIUM 137 135 135   POTASSIUM 5.1 4.5 3.8   CHLORIDE 104 103 99   CO2    GLUCOSE 122* 132* 98   BUN 13 19 16   CREATININE 1.04 0.98 0.82   CALCIUM 8.4* 7.9* 8.0*             HgbA1c:  6    Diabetic Educator Consult:  no    Medications:  Scheduled Medications   Medication Dose Frequency    furosemide  40 mg Q DAY    potassium chloride SA  20 mEq DAILY    guaiFENesin ER  600 mg Q12HRS    carvedilol  3.125 mg BID WITH MEALS    amLODIPine  5 mg Q DAY    enoxaparin (LOVENOX) injection  40 mg DAILY AT 1800    Nozin nasal  swab  1 Applicator BID     acetaminophen  1,000 mg Q6HRS    senna-docusate  2 Tablet BID    And    polyethylene glycol/lytes  1 Packet DAILY    And    magnesium hydroxide  30 mL DAILY    omeprazole  20 mg DAILY    aspirin  81 mg DAILY    clopidogrel  75 mg DAILY    atorvastatin  40 mg QHS        Exam:   Physical Exam  Vitals and nursing note reviewed.   Constitutional:       General: He is not in acute distress.     Appearance: Normal appearance.   HENT:      Head: Normocephalic and atraumatic.      Right Ear: External ear normal.      Left Ear: External ear normal.      Nose: Nose normal.      Mouth/Throat:      Mouth: Mucous membranes are moist.   Eyes:      Extraocular Movements: Extraocular movements intact.      Conjunctiva/sclera: Conjunctivae normal.      Pupils: Pupils are equal, round, and reactive to light.   Cardiovascular:      Rate and Rhythm: Normal rate and regular rhythm.      Pulses: Normal pulses.      Heart sounds: Normal heart sounds.   Pulmonary:      Effort: Pulmonary effort is normal.   Abdominal:      General: Abdomen is flat.      Palpations: Abdomen is soft.   Musculoskeletal:         General: Normal range of motion.      Cervical back: Normal range of motion.   Skin:     General: Skin is warm and dry.      Capillary Refill: Capillary refill takes less than 2 seconds.      Comments: Sternum- CDI  RSVG- ace wrap   Neurological:      General: No focal deficit present.      Mental Status: He is alert and oriented to person, place, and time. Mental status is at baseline.   Psychiatric:         Mood and Affect: Mood normal.         Behavior: Behavior normal.         Cardiac Medications:    ASA - Yes    Plavix - Yes    Post-operative Beta Blockers - Yes    Ace/ARB- Yes    Statin - Yes    Aldactone- Yes    SGLT2i-  YesYes    Ejection Fraction:  25%    Telemetry:   9/26 SR/ST  9/27 SR  9/28 SR  9/29 SR    Assessment/Plan:  POD 1 Extubated.  On dobutamine 7.5 epi 0.02.  Mild dizziness.  Adequate BP.  Moderate output from  mediastinals.  Cr 0.69 Hgb 13.6.  Plan: Keep mediastinal tubes and blackwell.  Wean dobutamine to 5 and recheck swan numbers following.  Wean epi.  Wean oxygen as tolerated.  Diurese when bp improved.  Replete calcium.    POD 2 HDS. SR. Off gtts.  Neuro intact.  Wounds CDI.  Abdomen soft nontender Cr 1.04 Hgb 12.4. Pacer wires removed.  UO marginal overnight.  Plan: Increase diuretics.  Keep blackwell for strict I/O.  Transfer to tele.  Wean oxygen.  Start low dose BB.    POD 3 HDS, HFrEF start GDMT when BP allows, SR, diuresed well- decrease to daily, incision c/di, amb/enc IS, awaiting bed on on tele    POD 4 HDS, HFrEF- start GDMT today, SR, 0.5lnc- wean today, incisions- c/d/I, amb/enc IS, planning home in am    Disposition:  PT/OT- home

## 2024-09-30 ENCOUNTER — PHARMACY VISIT (OUTPATIENT)
Dept: PHARMACY | Facility: MEDICAL CENTER | Age: 64
End: 2024-09-30
Payer: COMMERCIAL

## 2024-09-30 VITALS
OXYGEN SATURATION: 97 % | SYSTOLIC BLOOD PRESSURE: 113 MMHG | RESPIRATION RATE: 18 BRPM | HEART RATE: 80 BPM | HEIGHT: 65 IN | WEIGHT: 144.4 LBS | BODY MASS INDEX: 24.06 KG/M2 | DIASTOLIC BLOOD PRESSURE: 66 MMHG | TEMPERATURE: 97.7 F

## 2024-09-30 LAB
ANION GAP SERPL CALC-SCNC: 12 MMOL/L (ref 7–16)
BUN SERPL-MCNC: 13 MG/DL (ref 8–22)
CALCIUM SERPL-MCNC: 8.4 MG/DL (ref 8.5–10.5)
CHLORIDE SERPL-SCNC: 100 MMOL/L (ref 96–112)
CO2 SERPL-SCNC: 23 MMOL/L (ref 20–33)
CREAT SERPL-MCNC: 0.77 MG/DL (ref 0.5–1.4)
ERYTHROCYTE [DISTWIDTH] IN BLOOD BY AUTOMATED COUNT: 43.1 FL (ref 35.9–50)
GFR SERPLBLD CREATININE-BSD FMLA CKD-EPI: 101 ML/MIN/1.73 M 2
GLUCOSE SERPL-MCNC: 109 MG/DL (ref 65–99)
HCT VFR BLD AUTO: 32.7 % (ref 42–52)
HGB BLD-MCNC: 10.9 G/DL (ref 14–18)
MCH RBC QN AUTO: 29.9 PG (ref 27–33)
MCHC RBC AUTO-ENTMCNC: 33.3 G/DL (ref 32.3–36.5)
MCV RBC AUTO: 89.8 FL (ref 81.4–97.8)
PLATELET # BLD AUTO: 241 K/UL (ref 164–446)
PMV BLD AUTO: 10 FL (ref 9–12.9)
POTASSIUM SERPL-SCNC: 4 MMOL/L (ref 3.6–5.5)
RBC # BLD AUTO: 3.64 M/UL (ref 4.7–6.1)
SODIUM SERPL-SCNC: 135 MMOL/L (ref 135–145)
WBC # BLD AUTO: 8.9 K/UL (ref 4.8–10.8)

## 2024-09-30 PROCEDURE — 700111 HCHG RX REV CODE 636 W/ 250 OVERRIDE (IP): Mod: JZ | Performed by: NURSE PRACTITIONER

## 2024-09-30 PROCEDURE — 80048 BASIC METABOLIC PNL TOTAL CA: CPT

## 2024-09-30 PROCEDURE — 99024 POSTOP FOLLOW-UP VISIT: CPT | Performed by: NURSE PRACTITIONER

## 2024-09-30 PROCEDURE — 94669 MECHANICAL CHEST WALL OSCILL: CPT

## 2024-09-30 PROCEDURE — 700102 HCHG RX REV CODE 250 W/ 637 OVERRIDE(OP): Performed by: NURSE PRACTITIONER

## 2024-09-30 PROCEDURE — A9270 NON-COVERED ITEM OR SERVICE: HCPCS | Performed by: NURSE PRACTITIONER

## 2024-09-30 PROCEDURE — 85027 COMPLETE CBC AUTOMATED: CPT

## 2024-09-30 PROCEDURE — RXMED WILLOW AMBULATORY MEDICATION CHARGE: Performed by: NURSE PRACTITIONER

## 2024-09-30 RX ORDER — POTASSIUM CHLORIDE 1500 MG/1
20 TABLET, EXTENDED RELEASE ORAL DAILY
Qty: 30 TABLET | Refills: 0 | Status: SHIPPED | OUTPATIENT
Start: 2024-10-01 | End: 2024-10-08

## 2024-09-30 RX ORDER — SPIRONOLACTONE 25 MG/1
25 TABLET ORAL DAILY
Qty: 30 TABLET | Refills: 3 | Status: SHIPPED | OUTPATIENT
Start: 2024-10-01 | End: 2024-10-08 | Stop reason: SDUPTHER

## 2024-09-30 RX ORDER — LOSARTAN POTASSIUM 25 MG/1
25 TABLET ORAL DAILY
Qty: 30 TABLET | Refills: 2 | Status: SHIPPED | OUTPATIENT
Start: 2024-10-01 | End: 2024-10-08 | Stop reason: SDUPTHER

## 2024-09-30 RX ORDER — DAPAGLIFLOZIN 10 MG/1
10 TABLET, FILM COATED ORAL DAILY
Qty: 30 TABLET | Refills: 2 | Status: SHIPPED | OUTPATIENT
Start: 2024-10-01 | End: 2024-10-08 | Stop reason: SDUPTHER

## 2024-09-30 RX ORDER — CARVEDILOL 3.12 MG/1
3.12 TABLET ORAL 2 TIMES DAILY WITH MEALS
Qty: 60 TABLET | Refills: 2 | Status: SHIPPED | OUTPATIENT
Start: 2024-09-30 | End: 2024-10-08 | Stop reason: SDUPTHER

## 2024-09-30 RX ORDER — ACETAMINOPHEN 500 MG
1000 TABLET ORAL EVERY 6 HOURS PRN
COMMUNITY
Start: 2024-09-30

## 2024-09-30 RX ORDER — CLOPIDOGREL BISULFATE 75 MG/1
75 TABLET ORAL DAILY
Qty: 30 TABLET | Refills: 2 | Status: SHIPPED | OUTPATIENT
Start: 2024-10-01 | End: 2024-10-08 | Stop reason: SDUPTHER

## 2024-09-30 RX ORDER — FUROSEMIDE 20 MG/1
20 TABLET ORAL DAILY
Qty: 30 TABLET | Refills: 0 | Status: SHIPPED | OUTPATIENT
Start: 2024-09-30 | End: 2024-10-08

## 2024-09-30 RX ORDER — ASPIRIN 81 MG/1
81 TABLET ORAL DAILY
Qty: 100 TABLET | Refills: 2 | Status: SHIPPED | OUTPATIENT
Start: 2024-10-01 | End: 2024-10-08 | Stop reason: SDUPTHER

## 2024-09-30 RX ORDER — TRAMADOL HYDROCHLORIDE 50 MG/1
50 TABLET ORAL EVERY 6 HOURS PRN
Qty: 56 TABLET | Refills: 0 | Status: SHIPPED | OUTPATIENT
Start: 2024-09-30 | End: 2024-10-14

## 2024-09-30 RX ORDER — ATORVASTATIN CALCIUM 40 MG/1
40 TABLET, FILM COATED ORAL
Qty: 30 TABLET | Refills: 2 | Status: SHIPPED | OUTPATIENT
Start: 2024-09-30 | End: 2024-10-08 | Stop reason: SDUPTHER

## 2024-09-30 RX ADMIN — POTASSIUM CHLORIDE 20 MEQ: 1500 TABLET, EXTENDED RELEASE ORAL at 06:17

## 2024-09-30 RX ADMIN — FUROSEMIDE 40 MG: 10 INJECTION, SOLUTION INTRAVENOUS at 06:18

## 2024-09-30 RX ADMIN — ASPIRIN 81 MG: 81 TABLET, COATED ORAL at 06:18

## 2024-09-30 RX ADMIN — SPIRONOLACTONE 25 MG: 25 TABLET ORAL at 06:18

## 2024-09-30 RX ADMIN — POLYETHYLENE GLYCOL 3350 1 PACKET: 17 POWDER, FOR SOLUTION ORAL at 06:17

## 2024-09-30 RX ADMIN — ACETAMINOPHEN 1000 MG: 500 TABLET ORAL at 01:26

## 2024-09-30 RX ADMIN — OMEPRAZOLE 20 MG: 20 CAPSULE, DELAYED RELEASE ORAL at 06:17

## 2024-09-30 RX ADMIN — ACETAMINOPHEN 1000 MG: 500 TABLET ORAL at 06:18

## 2024-09-30 RX ADMIN — GUAIFENESIN 600 MG: 600 TABLET, EXTENDED RELEASE ORAL at 06:18

## 2024-09-30 RX ADMIN — Medication 1 APPLICATOR: at 08:24

## 2024-09-30 RX ADMIN — LOSARTAN POTASSIUM 25 MG: 25 TABLET, FILM COATED ORAL at 06:18

## 2024-09-30 RX ADMIN — CLOPIDOGREL BISULFATE 75 MG: 75 TABLET ORAL at 06:17

## 2024-09-30 RX ADMIN — DAPAGLIFLOZIN 10 MG: 10 TABLET, FILM COATED ORAL at 06:17

## 2024-09-30 RX ADMIN — ACETAMINOPHEN 1000 MG: 500 TABLET ORAL at 11:10

## 2024-09-30 RX ADMIN — CARVEDILOL 3.12 MG: 3.12 TABLET, FILM COATED ORAL at 08:24

## 2024-09-30 RX ADMIN — SENNOSIDES AND DOCUSATE SODIUM 2 TABLET: 50; 8.6 TABLET ORAL at 06:17

## 2024-09-30 ASSESSMENT — FIBROSIS 4 INDEX: FIB4 SCORE: 1.22

## 2024-09-30 NOTE — CARE PLAN
The patient is Stable - Low risk of patient condition declining or worsening    Shift Goals  Clinical Goals: Monitor VS, SpO2 overnight, stay off oxygen, education  Patient Goals: go home in AM  Family Goals: education, get well, go home    Progress made toward(s) clinical / shift goals:    Problem: Post Op Day 4 CABG/Heart Valve Replacement  Goal: Optimal care of the Post Op CABG/Heart Valve replacement Post Op Day 4  Outcome: Progressing  Intervention: Daily weights in the morning  Note: Completed, patient lost 1kg  Intervention: Shower daily and clean incisions twice daily with soap and water  Note: Completed, incisions c/d/i  Intervention: Up in chair for all meals  Note: completed  Intervention: Ambulate 4 times daily, increasing the distance each time  Note: Completed, patient ambulating up to 800 feet w/o SOB or increase in WOB or need of supplemental O2 by self  Intervention: IS q 1 hour while awake and record best IS volume  Note: 1500, done  Intervention: Consider removal of blackwell, chest tube and pacer wires if not already done  Note: done  Intervention: Discharge Education  Note: Patient maintaining sternal precautions and able to verbal discharge instructions including how to take medications, clean incisions, f/u appointments, and to limit straining.  Intervention: Add special instructions for cardiac surgery discharge instructions to after visit summary and review with patient  Note: To be completed       Patient is not progressing towards the following goals:

## 2024-09-30 NOTE — PROGRESS NOTES
Bedside report received from off going RN/tech: Manny assumed care of patient.     Fall Risk Score: LOW RISK  Fall risk interventions in place: Up Self  Bed type: Regular (Andrei Score less than 17 interventions in place)  Patient on cardiac monitor: Yes  IVF/IV medications: Not Applicable   Oxygen: Room Air  Bedside sitter: Not Applicable   Isolation: Not applicable

## 2024-09-30 NOTE — DISCHARGE INSTRUCTIONS
DIVISION OF CARDIAC SURGERY   DISCHARGE INSTRUCTIONS    Activity:    NO driving for 4 weeks after surgery. You may ride as a passenger.  NO lifting, pushing, or pulling more than 10 pounds for 6 weeks.  For the next 6 weeks, keep your elbows close to your body and move within a pain-free motion when lifting, pushing or pulling.  Do not stretch both arms backwards at the same time.    Walk at least 4 times per day, there is no maximum. The goal is to increase your distance over time.  Continue using incentive spirometer for 2 weeks or until your baseline volume is reached.  If you are going home on oxygen and you were not on oxygen prior to surgery, keep using until you are oxygen free.  Weigh yourself daily.  Call your Cardiologist for a weight gain of 3 or more pounds in 1 day or more than 5 pounds in 7 days.  Take all of your medications as prescribed. Do not use a pill box for the first month at home. If you have questions, please call your nurse navigator at [phone].  Continue to wear the LARRY (compression) stockings for 2-4 weeks or until all swelling is gone. You may take them off when you are in bed or when your legs are elevated.    Incision Care:    Make sure to clean your incision(s) TWICE DAILY.  Once by showering AND once using the no rinse Foam cleanser provided in the hospital.  During the shower, cleanse the incision(s) with a perfume and dye free soap (Dial, Dove, [place])  Use gentle pressure and rub up and down over incision with your hands or a washcloth. Rinse off and pat incision(s) dry with clean towel.  Keep the incision open to air. No creams or lotions on your incision(s). No baths.  If there is any increased redness or swelling, separation of the incision line, or thick drainage from any of your incisions, call the Cardiac Surgeons ([phone]).      General Instructions:    You have been referred to Cardiac Rehab.  You can start Cardiac Rehab 30 days after surgery.  If you do  not have an appointment at the time of discharge call [phone] to schedule an appointment.  Your Primary Care Doctor typically handles home oxygen. Oxygen may be stopped when your oxygen level is consistently greater than 90.  Check with your Primary Care Doctor if you are unsure.  Take all of your medications (including pain medications) as prescribed.  Taking medications other than prescribed can result in serious injury.    For Patients Discharged with Narcotic Pain Medication:     If a refill is needed, understand that only 1 refill will be provided and you must come to the Cardiac Surgeons’ office for an appointment (72 hours’ notice is required to schedule and there are no weekend appointments).  If the pain medications you are discharged on are not working, you will need to bring your remaining prescription into the office in order to receive a new prescription.  If you were taking narcotics prior to your heart surgery, the Cardiac Surgeons will provide you with one prescription and additional medications will need to be provided by your pain management doctor.  Do not drink alcohol while taking narcotics.  Lost or stolen medications will not be refilled.  If medications are stolen, report to law enforcement.    Contact Cardiac Surgery at [phone] if you have any questions.

## 2024-09-30 NOTE — PROGRESS NOTES
Patient discharge instructions reviewed with patient and patient's sister. Patient and sister verbalized understanding. I provided the heart surgery and heart failure pamphlets to the patient and went over both of them with him and his sister.     Patient discharged into care of family via private vehicle. Patient displayed no signs or symptoms of distress at time of discharge.

## 2024-09-30 NOTE — DISCHARGE SUMMARY
DISCHARGE SUMMARY    ADMISSION DATE: 9/22/2024    DISCHARGE DATE: 9/30/24    ADMITTING DIAGNOSES: Non-STEMI, ischemic cardiomyopathy, acute and chronic congestive heart failure, multivessel coronary artery disease, tobacco abuse     DISCHARGE DIAGNOSES: Non-STEMI, ischemic cardiomyopathy, acute and chronic congestive heart failure, multivessel coronary artery disease, tobacco abuse     PROCEDURES PERFORMED:   9/25/24 Dr. Sheffield  ULTRASOUND GUIDED INSERTION OF LEFT FEMORAL ARTERIAL LINE  CABG X 4  WITH SKELENTONIZED SEQUEIRA TO LAD  RSVG TO DIAG 2  RSVG TO RAMUS  RSVG TO RPDA  WITH ENDOSCOPIC VEIN PROCUREMENT OF RIGHT GREATER SAPHENOUS VEIN - Wound Class: Clean with Drain  ECHOCARDIOGRAM, TRANSESOPHAGEAL, INTRAOPERATIVE - Wound Class: Clean Contaminated    HISTORY OF PRESENT ILLNESS:  The patient is a 63 y.o. male with a past medical history significant for hypertension, diabetes, tobacco use who presented to outside hospital in [place] with chest pain, was found to have elevated troponin of 5900 and was transferred to [facility] for NSTEMI. He was started on medical management and underwent urgent left heart catheterization showing multivessel coronary artery disease. He also had CT chest demonstrating bilateral pulmonary effusions, mediastinal lymphadenopathy, multifocal airspace disease, severe peribronchial thickening, and atheromatous calcification of thoracic aorta. He was then transferred to [facility] for a higher level of care and cardiac surgery consultation.     HOSPITAL COURSE:   POD 1 Extubated.  On dobutamine 7.5 epi 0.02.  Mild dizziness.  Adequate BP.  Moderate output from mediastinals.  Cr 0.69 Hgb 13.6.  Plan: Keep mediastinal tubes and blackwell.  Wean dobutamine to 5 and recheck swan numbers following.  Wean epi.  Wean oxygen as tolerated.  Diurese when bp improved.  Replete calcium.     POD 2 HDS. SR. Off gtts.  Neuro intact.  Wounds CDI.  Abdomen soft nontender Cr 1.04 Hgb  12.4. Pacer wires removed.  UO marginal overnight.  Plan: Increase diuretics.  Keep blackwell for strict I/O.  Transfer to tele.  Wean oxygen.  Start low dose BB.     POD 3 HDS, HFrEF start GDMT when BP allows, SR, diuresed well- decrease to daily, incision c/di, amb/enc IS, awaiting bed on on tele     POD 4 HDS, HFrEF- start GDMT today, SR, 0.5lnc- wean today, incisions- c/d/I, amb/enc IS, planning home in am    POD 5  HDS, SR, neuro intact, wounds intact, abdomen soft +BM, fluid balance negative, wt down,  room air.  Plan:  Discharge home with sisters/family support. Patient to follow up in clinic in one month.    TELEMETRY:  9/26 SR/ST  9/27 SR  9/28 SR  9/29 SR  9/30 SR    RECENT LABS:     Lab Results   Component Value Date/Time    SODIUM 135 09/30/2024 01:30 AM    POTASSIUM 4.0 09/30/2024 01:30 AM    CHLORIDE 100 09/30/2024 01:30 AM    CO2 23 09/30/2024 01:30 AM    GLUCOSE 109 (H) 09/30/2024 01:30 AM    BUN 13 09/30/2024 01:30 AM    CREATININE 0.77 09/30/2024 01:30 AM    BUNCREATRAT 18.2 09/22/2024 03:25 AM      Lab Results   Component Value Date/Time    WBC 8.9 09/30/2024 01:30 AM    RBC 3.64 (L) 09/30/2024 01:30 AM    HEMOGLOBIN 10.9 (L) 09/30/2024 01:30 AM    HEMATOCRIT 32.7 (L) 09/30/2024 01:30 AM    MCV 89.8 09/30/2024 01:30 AM    MCH 29.9 09/30/2024 01:30 AM    MCHC 33.3 09/30/2024 01:30 AM    MPV 10.0 09/30/2024 01:30 AM    NEUTSPOLYS 60.80 09/24/2024 08:15 PM    LYMPHOCYTES 25.80 09/24/2024 08:15 PM    MONOCYTES 7.20 09/24/2024 08:15 PM    EOSINOPHILS 5.10 09/24/2024 08:15 PM    BASOPHILS 0.90 09/24/2024 08:15 PM      Lab Results   Component Value Date/Time    PROTHROMBTM 19.6 (H) 09/25/2024 12:50 PM    INR 1.65 (H) 09/25/2024 12:50 PM        Fluids:    Intake/Output Summary (Last 24 hours) at 9/30/2024 1025  Last data filed at 9/30/2024 0600  Gross per 24 hour   Intake 980 ml   Output 1450 ml   Net -470 ml     Admit weight: Weight: 63.1 kg (139 lb 1.8 oz)  Current weight: Weight: 65.5 kg (144 lb 6.4 oz)  (09/30/24 0629)    ALLERGIES:     Patient has no known allergies.    EJECTION FRACTION:  25%    CARDIAC MEDICATIONS:    ASA - Yes    Plavix - Yes    Post-operative Beta Blockers - Yes    Ace/ARB- Yes    Statin - Yes    Aldactone- Yes    SGLT2i-  YesYes    DISCHARGE MEDICATIONS:      Medication List        START taking these medications        Instructions   acetaminophen 500 MG Tabs  Commonly known as: Tylenol   Take 2 Tablets by mouth every 6 hours as needed for Moderate Pain or Mild Pain.  Dose: 1,000 mg     aspirin 81 MG EC tablet  Start taking on: October 1, 2024   Take 1 Tablet by mouth every day.  Dose: 81 mg     atorvastatin 40 MG Tabs  Commonly known as: Lipitor   Take 1 Tablet by mouth at bedtime.  Dose: 40 mg     carvedilol 3.125 MG Tabs  Commonly known as: Coreg   Take 1 Tablet by mouth 2 times a day with meals.  Dose: 3.125 mg     clopidogrel 75 MG Tabs  Start taking on: October 1, 2024  Commonly known as: Plavix   Take 1 Tablet by mouth every day.  Dose: 75 mg     dapagliflozin propanediol 10 MG Tabs  Start taking on: October 1, 2024  Commonly known as: Farxiga   Take 1 Tablet by mouth every day.  Dose: 10 mg     furosemide 20 MG Tabs  Commonly known as: Lasix   Take 1 Tablet by mouth every day.  Dose: 20 mg     losartan 25 MG Tabs  Start taking on: October 1, 2024  Commonly known as: Cozaar   Take 1 Tablet by mouth every day.  Dose: 25 mg     potassium chloride SA 20 MEQ Tbcr  Start taking on: October 1, 2024  Commonly known as: Kdur   Take 1 Tablet by mouth every day.  Dose: 20 mEq     spironolactone 25 MG Tabs  Start taking on: October 1, 2024  Commonly known as: Aldactone   Take 1 Tablet by mouth every day.  Dose: 25 mg     traMADol 50 MG Tabs  Commonly known as: Ultram   Take 1 Tablet by mouth every 6 hours as needed for Moderate Pain or Severe Pain for up to 14 days.  Dose: 50 mg              NARCOTIC PAIN MEDICATIONS:   In prescribing controlled substances to this patient, I certify that I have  obtained and reviewed their medical history. I have also made a good earl effort to obtain applicable records from other providers who have treated the patient .    I have conducted a physical exam and documented it. I have reviewed the patient's prescription history as maintained by the [place] Prescription Monitoring Program.     I have assessed the patient’s risk for abuse, dependency, and addiction using the validated Opioid Risk Tool.    Given the above, I believe the benefits of controlled substance therapy outweigh the risks. The reasons for prescribing controlled substances include non-narcotic, oral analgesic alternatives have been inadequate for pain control. Accordingly, I have discussed the risk and benefits, treatment plan, and alternative therapies with the patient.     Pt understands this prescription is a controlled substance which is potentially habit-forming and its use is regulated by the [place]. It must be submitted to the pharmacy within 5 days of the date written and can not be called in or faxed to the pharmacy. Refills are subject to terms of a medicine agreement. Any refill requires a new prescription that must be obtained from this office during regular office hours Monday through Thursday 7 am to 4 pm. We ask for 72 hours notice to get an appointment for a narcotic pain medication refill. This medicine can cause nausea, significant constipation, sedation, confusion.     DIET:   Cardiac diet    DISCHARGE INSTRUCTIONS DISCUSSED WITH THE PATIENT:      1. NO driving for 4 weeks after surgery. You may ride as a passenger.  2. NO lifting of any item over 10 lbs (e.g. gallon of milk) for 6 weeks after surgery.  3. DO walk as much as possible! Walk a minimum of once a day. Depending on your fatigue and comfort level, you may walk as much as you wish. There is no maximum.  4. Other physical activities (sex, housework, gardening, etc.) are OK after 4 weeks   5. Continue using incentive spirometer for 2  weeks, especially if going home on oxygen.    Incision Care:  1. SHOWER ONLY - no baths. Clean incision daily with plain Ivory ® soap or any other dye or perfume free soap. Then pat incision dry with clean towel. Avoid creams or lotions on the incision(s).  a. If there is any increase in redness or swelling, or separation of the incision line, or thick drainage* from any of the incisions, call right away  * Clear, thin drainage is not abnormal especially from the leg incision and/or                         chest tube sites.  2. Continue to wear your LARRY Stockings for 4 weeks. You may take off the stockings when in bed or when the legs are elevated.    Patient instructed to call [facility] cardiac surgery at [phone]  if any increased shortness of breath, uncontrolled pain, weight gain greater than 3 pounds in 1 day or 5 pounds in 1 week, SBP >140, HR <60 or redness swelling or drainage of incisions.      FOLLOW-UP:   Future Appointments   Date Time Provider Department Center   10/28/2024 12:30 PM CT RESOURCE PROVIDER [facility] None

## 2024-09-30 NOTE — DISCHARGE PLANNING
"Discharge Discussion and home care recap prior to discharge:      Discharge review was completed with patient and family.    Patient was reminded that outpatient cardiac rehab beginning 6 weeks post op. They were told it is highly recommended and available to them if desired, but not required. They were told to look at the provided flyer for the contact number and additional information.    Patient was reminded to utilize the vitals log book provided to take his/her weight daily and record.    Patient was told to call me with weight gain > 3 lbs in 1 day or 5 lbs in 1 week  Patient was also told to record heart rate and blood pressure morning and night; and to call for concerning trends.    Patient was reminded to review the \"recovery after heart surgery\" packet with information on normal expectations such as clicking in the chest, loud/pounding heart/ hot and cold flashes, weight loss, constipation, insomnia, tingling on left side of chest (CABG with LIMA).  I also reviewed the decision tree of whom to call and when with concerns after going home. RN navigator Monday through Friday during business hours for any questions or urgent concerns, and the office for urgent concerns at night or on the weekends.    I briefly recapped the discharge instructions that their primary RN will review in depth prior to discharge   1) appointments   2) medication reconciliation (start, continue, change, stop)   3) care instructions    All additional questions were answered or deferred to the bedside RN.    Event time 30 minutes    "

## 2024-09-30 NOTE — PROGRESS NOTES
Bedside report received from off going RN: Jose Luis, assumed care of patient.     Fall Risk Score: LOW RISK  Fall risk interventions in place: Place yellow fall risk ID band on patient, Provide patient/family education based on risk assessment, Educate patient/family to call staff for assistance when getting out of bed, Place fall precaution signage outside patient door, and Place patient in room close to nursing station  Bed type: Regular (Andrei Score less than 17 interventions in place)  Patient on cardiac monitor: Yes  IVF/IV medications: Not Applicable   Oxygen: Room Air  Bedside sitter: Not Applicable   Isolation: Not applicable

## 2024-10-03 ENCOUNTER — TELEPHONE (OUTPATIENT)
Dept: CARDIOLOGY | Facility: MEDICAL CENTER | Age: 64
End: 2024-10-03
Payer: COMMERCIAL

## 2024-10-04 ENCOUNTER — TELEPHONE (OUTPATIENT)
Dept: CARDIOTHORACIC SURGERY | Facility: MEDICAL CENTER | Age: 64
End: 2024-10-04
Payer: COMMERCIAL

## 2024-10-08 ENCOUNTER — APPOINTMENT (OUTPATIENT)
Dept: RADIOLOGY | Facility: MEDICAL CENTER | Age: 64
DRG: 312 | End: 2024-10-08
Attending: PSYCHIATRY & NEUROLOGY
Payer: COMMERCIAL

## 2024-10-08 ENCOUNTER — APPOINTMENT (OUTPATIENT)
Dept: RADIOLOGY | Facility: MEDICAL CENTER | Age: 64
DRG: 312 | End: 2024-10-08
Attending: EMERGENCY MEDICINE
Payer: COMMERCIAL

## 2024-10-08 ENCOUNTER — OFFICE VISIT (OUTPATIENT)
Dept: CARDIOLOGY | Facility: MEDICAL CENTER | Age: 64
End: 2024-10-08
Attending: THORACIC SURGERY (CARDIOTHORACIC VASCULAR SURGERY)
Payer: COMMERCIAL

## 2024-10-08 ENCOUNTER — HOSPITAL ENCOUNTER (INPATIENT)
Facility: MEDICAL CENTER | Age: 64
LOS: 1 days | DRG: 312 | End: 2024-10-09
Attending: EMERGENCY MEDICINE | Admitting: STUDENT IN AN ORGANIZED HEALTH CARE EDUCATION/TRAINING PROGRAM
Payer: COMMERCIAL

## 2024-10-08 VITALS
HEART RATE: 88 BPM | BODY MASS INDEX: 21.89 KG/M2 | HEIGHT: 65 IN | RESPIRATION RATE: 16 BRPM | WEIGHT: 131.4 LBS | OXYGEN SATURATION: 98 % | SYSTOLIC BLOOD PRESSURE: 90 MMHG | DIASTOLIC BLOOD PRESSURE: 60 MMHG

## 2024-10-08 DIAGNOSIS — I25.9 ISCHEMIC HEART DISEASE DUE TO CORONARY ARTERY OBSTRUCTION (HCC): ICD-10-CM

## 2024-10-08 DIAGNOSIS — I25.5 ISCHEMIC CARDIOMYOPATHY: ICD-10-CM

## 2024-10-08 DIAGNOSIS — R55 SYNCOPE AND COLLAPSE: ICD-10-CM

## 2024-10-08 DIAGNOSIS — E78.5 DYSLIPIDEMIA: ICD-10-CM

## 2024-10-08 DIAGNOSIS — I24.0 ISCHEMIC HEART DISEASE DUE TO CORONARY ARTERY OBSTRUCTION (HCC): ICD-10-CM

## 2024-10-08 DIAGNOSIS — Z72.0 TOBACCO ABUSE: ICD-10-CM

## 2024-10-08 DIAGNOSIS — Z95.1 S/P CABG (CORONARY ARTERY BYPASS GRAFT): ICD-10-CM

## 2024-10-08 DIAGNOSIS — I95.9 HYPOTENSION, UNSPECIFIED HYPOTENSION TYPE: ICD-10-CM

## 2024-10-08 DIAGNOSIS — I50.20 HEART FAILURE WITH REDUCED EJECTION FRACTION (HCC): ICD-10-CM

## 2024-10-08 PROBLEM — G93.9 BRAIN LESION: Status: ACTIVE | Noted: 2024-10-08

## 2024-10-08 LAB
ALBUMIN SERPL BCP-MCNC: 3.4 G/DL (ref 3.2–4.9)
ALBUMIN/GLOB SERPL: 0.9 G/DL
ALP SERPL-CCNC: 100 U/L (ref 30–99)
ALT SERPL-CCNC: 30 U/L (ref 2–50)
ANION GAP SERPL CALC-SCNC: 8 MMOL/L (ref 7–16)
AST SERPL-CCNC: 28 U/L (ref 12–45)
BASOPHILS # BLD AUTO: 0.9 % (ref 0–1.8)
BASOPHILS # BLD: 0.07 K/UL (ref 0–0.12)
BILIRUB SERPL-MCNC: 0.4 MG/DL (ref 0.1–1.5)
BUN SERPL-MCNC: 23 MG/DL (ref 8–22)
CALCIUM ALBUM COR SERPL-MCNC: 9.1 MG/DL (ref 8.5–10.5)
CALCIUM SERPL-MCNC: 8.6 MG/DL (ref 8.5–10.5)
CFT BLD TEG: 4.2 MIN (ref 4.6–9.1)
CFT P HPASE BLD TEG: 4.4 MIN (ref 4.3–8.3)
CHLORIDE SERPL-SCNC: 103 MMOL/L (ref 96–112)
CLOT ANGLE BLD TEG: 81 DEGREES (ref 63–78)
CLOT LYSIS 30M P MA LENFR BLD TEG: 0 % (ref 0–2.6)
CO2 SERPL-SCNC: 25 MMOL/L (ref 20–33)
CREAT SERPL-MCNC: 1.22 MG/DL (ref 0.5–1.4)
CT.EXTRINSIC BLD ROTEM: 0.7 MIN (ref 0.8–2.1)
EKG IMPRESSION: NORMAL
EOSINOPHIL # BLD AUTO: 0.44 K/UL (ref 0–0.51)
EOSINOPHIL NFR BLD: 5.6 % (ref 0–6.9)
ERYTHROCYTE [DISTWIDTH] IN BLOOD BY AUTOMATED COUNT: 42.4 FL (ref 35.9–50)
EST. AVERAGE GLUCOSE BLD GHB EST-MCNC: 128 MG/DL
GFR SERPLBLD CREATININE-BSD FMLA CKD-EPI: 67 ML/MIN/1.73 M 2
GLOBULIN SER CALC-MCNC: 3.9 G/DL (ref 1.9–3.5)
GLUCOSE SERPL-MCNC: 109 MG/DL (ref 65–99)
HBA1C MFR BLD: 6.1 % (ref 4–5.6)
HCT VFR BLD AUTO: 34 % (ref 42–52)
HGB BLD-MCNC: 11.5 G/DL (ref 14–18)
IMM GRANULOCYTES # BLD AUTO: 0.04 K/UL (ref 0–0.11)
IMM GRANULOCYTES NFR BLD AUTO: 0.5 % (ref 0–0.9)
LACTATE SERPL-SCNC: 0.9 MMOL/L (ref 0.5–2)
LYMPHOCYTES # BLD AUTO: 1.32 K/UL (ref 1–4.8)
LYMPHOCYTES NFR BLD: 16.8 % (ref 22–41)
MAGNESIUM SERPL-MCNC: 2.3 MG/DL (ref 1.5–2.5)
MCF BLD TEG: 71.6 MM (ref 52–69)
MCF.PLATELET INHIB BLD ROTEM: 44.3 MM (ref 15–32)
MCH RBC QN AUTO: 29.9 PG (ref 27–33)
MCHC RBC AUTO-ENTMCNC: 33.8 G/DL (ref 32.3–36.5)
MCV RBC AUTO: 88.5 FL (ref 81.4–97.8)
MONOCYTES # BLD AUTO: 0.67 K/UL (ref 0–0.85)
MONOCYTES NFR BLD AUTO: 8.5 % (ref 0–13.4)
NEUTROPHILS # BLD AUTO: 5.32 K/UL (ref 1.82–7.42)
NEUTROPHILS NFR BLD: 67.7 % (ref 44–72)
NRBC # BLD AUTO: 0 K/UL
NRBC BLD-RTO: 0 /100 WBC (ref 0–0.2)
PA AA BLD-ACNC: ABNORMAL % (ref 0–11)
PA ADP BLD-ACNC: ABNORMAL % (ref 0–17)
PLATELET # BLD AUTO: 561 K/UL (ref 164–446)
PMV BLD AUTO: 8.9 FL (ref 9–12.9)
POTASSIUM SERPL-SCNC: 4.4 MMOL/L (ref 3.6–5.5)
PROT SERPL-MCNC: 7.3 G/DL (ref 6–8.2)
RBC # BLD AUTO: 3.84 M/UL (ref 4.7–6.1)
SODIUM SERPL-SCNC: 136 MMOL/L (ref 135–145)
TEG ALGORITHM TGALG: ABNORMAL
TROPONIN T SERPL-MCNC: 38 NG/L (ref 6–19)
TSH SERPL DL<=0.005 MIU/L-ACNC: 0.71 UIU/ML (ref 0.38–5.33)
WBC # BLD AUTO: 7.9 K/UL (ref 4.8–10.8)

## 2024-10-08 PROCEDURE — 99223 1ST HOSP IP/OBS HIGH 75: CPT | Performed by: STUDENT IN AN ORGANIZED HEALTH CARE EDUCATION/TRAINING PROGRAM

## 2024-10-08 PROCEDURE — 93005 ELECTROCARDIOGRAM TRACING: CPT | Performed by: EMERGENCY MEDICINE

## 2024-10-08 PROCEDURE — 700102 HCHG RX REV CODE 250 W/ 637 OVERRIDE(OP): Performed by: STUDENT IN AN ORGANIZED HEALTH CARE EDUCATION/TRAINING PROGRAM

## 2024-10-08 PROCEDURE — 85025 COMPLETE CBC W/AUTO DIFF WBC: CPT

## 2024-10-08 PROCEDURE — 99205 OFFICE O/P NEW HI 60 MIN: CPT | Performed by: INTERNAL MEDICINE

## 2024-10-08 PROCEDURE — 700117 HCHG RX CONTRAST REV CODE 255: Mod: JZ | Performed by: PSYCHIATRY & NEUROLOGY

## 2024-10-08 PROCEDURE — 99285 EMERGENCY DEPT VISIT HI MDM: CPT

## 2024-10-08 PROCEDURE — 3078F DIAST BP <80 MM HG: CPT | Performed by: INTERNAL MEDICINE

## 2024-10-08 PROCEDURE — 83036 HEMOGLOBIN GLYCOSYLATED A1C: CPT

## 2024-10-08 PROCEDURE — 85384 FIBRINOGEN ACTIVITY: CPT

## 2024-10-08 PROCEDURE — 700105 HCHG RX REV CODE 258: Performed by: EMERGENCY MEDICINE

## 2024-10-08 PROCEDURE — 85576 BLOOD PLATELET AGGREGATION: CPT | Mod: 91

## 2024-10-08 PROCEDURE — 3074F SYST BP LT 130 MM HG: CPT | Performed by: INTERNAL MEDICINE

## 2024-10-08 PROCEDURE — 83605 ASSAY OF LACTIC ACID: CPT

## 2024-10-08 PROCEDURE — A9579 GAD-BASE MR CONTRAST NOS,1ML: HCPCS | Mod: JZ | Performed by: PSYCHIATRY & NEUROLOGY

## 2024-10-08 PROCEDURE — 770020 HCHG ROOM/CARE - TELE (206)

## 2024-10-08 PROCEDURE — 70496 CT ANGIOGRAPHY HEAD: CPT

## 2024-10-08 PROCEDURE — 85347 COAGULATION TIME ACTIVATED: CPT

## 2024-10-08 PROCEDURE — 99212 OFFICE O/P EST SF 10 MIN: CPT | Performed by: INTERNAL MEDICINE

## 2024-10-08 PROCEDURE — A9270 NON-COVERED ITEM OR SERVICE: HCPCS | Performed by: STUDENT IN AN ORGANIZED HEALTH CARE EDUCATION/TRAINING PROGRAM

## 2024-10-08 PROCEDURE — 700117 HCHG RX CONTRAST REV CODE 255: Performed by: EMERGENCY MEDICINE

## 2024-10-08 PROCEDURE — 99213 OFFICE O/P EST LOW 20 MIN: CPT | Performed by: INTERNAL MEDICINE

## 2024-10-08 PROCEDURE — 70450 CT HEAD/BRAIN W/O DYE: CPT

## 2024-10-08 PROCEDURE — 84484 ASSAY OF TROPONIN QUANT: CPT

## 2024-10-08 PROCEDURE — 36415 COLL VENOUS BLD VENIPUNCTURE: CPT

## 2024-10-08 PROCEDURE — 80053 COMPREHEN METABOLIC PANEL: CPT

## 2024-10-08 PROCEDURE — 70498 CT ANGIOGRAPHY NECK: CPT

## 2024-10-08 PROCEDURE — 70553 MRI BRAIN STEM W/O & W/DYE: CPT

## 2024-10-08 PROCEDURE — 84443 ASSAY THYROID STIM HORMONE: CPT

## 2024-10-08 PROCEDURE — 83735 ASSAY OF MAGNESIUM: CPT

## 2024-10-08 RX ORDER — DAPAGLIFLOZIN 10 MG/1
10 TABLET, FILM COATED ORAL DAILY
Status: DISCONTINUED | OUTPATIENT
Start: 2024-10-09 | End: 2024-10-09 | Stop reason: HOSPADM

## 2024-10-08 RX ORDER — SODIUM CHLORIDE 9 MG/ML
1000 INJECTION, SOLUTION INTRAVENOUS ONCE
Status: COMPLETED | OUTPATIENT
Start: 2024-10-08 | End: 2024-10-08

## 2024-10-08 RX ORDER — LOSARTAN POTASSIUM 25 MG/1
25 TABLET ORAL DAILY
Qty: 90 TABLET | Refills: 3 | Status: SHIPPED | OUTPATIENT
Start: 2024-10-08 | End: 2024-10-08 | Stop reason: SDUPTHER

## 2024-10-08 RX ORDER — ACETAMINOPHEN 325 MG/1
650 TABLET ORAL EVERY 6 HOURS PRN
Status: DISCONTINUED | OUTPATIENT
Start: 2024-10-08 | End: 2024-10-09 | Stop reason: HOSPADM

## 2024-10-08 RX ORDER — CLOPIDOGREL BISULFATE 75 MG/1
75 TABLET ORAL DAILY
Qty: 90 TABLET | Refills: 3 | Status: SHIPPED | OUTPATIENT
Start: 2024-10-08

## 2024-10-08 RX ORDER — CARVEDILOL 3.12 MG/1
3.12 TABLET ORAL 2 TIMES DAILY WITH MEALS
Status: DISCONTINUED | OUTPATIENT
Start: 2024-10-08 | End: 2024-10-09

## 2024-10-08 RX ORDER — ASPIRIN 81 MG/1
81 TABLET ORAL DAILY
Qty: 100 TABLET | Refills: 3 | Status: SHIPPED | OUTPATIENT
Start: 2024-10-08

## 2024-10-08 RX ORDER — PROMETHAZINE HYDROCHLORIDE 25 MG/1
12.5-25 SUPPOSITORY RECTAL EVERY 4 HOURS PRN
Status: DISCONTINUED | OUTPATIENT
Start: 2024-10-08 | End: 2024-10-09 | Stop reason: HOSPADM

## 2024-10-08 RX ORDER — ATORVASTATIN CALCIUM 40 MG/1
40 TABLET, FILM COATED ORAL
Qty: 90 TABLET | Refills: 3 | Status: SHIPPED | OUTPATIENT
Start: 2024-10-08

## 2024-10-08 RX ORDER — ATORVASTATIN CALCIUM 40 MG/1
40 TABLET, FILM COATED ORAL
Status: DISCONTINUED | OUTPATIENT
Start: 2024-10-08 | End: 2024-10-09 | Stop reason: HOSPADM

## 2024-10-08 RX ORDER — PROMETHAZINE HYDROCHLORIDE 25 MG/1
12.5-25 TABLET ORAL EVERY 4 HOURS PRN
Status: DISCONTINUED | OUTPATIENT
Start: 2024-10-08 | End: 2024-10-09 | Stop reason: HOSPADM

## 2024-10-08 RX ORDER — ONDANSETRON 4 MG/1
4 TABLET, ORALLY DISINTEGRATING ORAL EVERY 4 HOURS PRN
Status: DISCONTINUED | OUTPATIENT
Start: 2024-10-08 | End: 2024-10-09 | Stop reason: HOSPADM

## 2024-10-08 RX ORDER — FUROSEMIDE 20 MG/1
20 TABLET ORAL DAILY
Status: ON HOLD | COMMUNITY
End: 2024-10-09

## 2024-10-08 RX ORDER — POTASSIUM CHLORIDE 1.5 G/1.58G
20 POWDER, FOR SOLUTION ORAL DAILY
Status: ON HOLD | COMMUNITY
End: 2024-10-09

## 2024-10-08 RX ORDER — CARVEDILOL 3.12 MG/1
3.12 TABLET ORAL 2 TIMES DAILY WITH MEALS
Status: DISCONTINUED | OUTPATIENT
Start: 2024-10-08 | End: 2024-10-08

## 2024-10-08 RX ORDER — ENOXAPARIN SODIUM 100 MG/ML
40 INJECTION SUBCUTANEOUS DAILY
Status: DISCONTINUED | OUTPATIENT
Start: 2024-10-08 | End: 2024-10-08

## 2024-10-08 RX ORDER — SPIRONOLACTONE 25 MG/1
25 TABLET ORAL DAILY
Qty: 90 TABLET | Refills: 3 | Status: SHIPPED | OUTPATIENT
Start: 2024-10-08 | End: 2024-10-08 | Stop reason: SDUPTHER

## 2024-10-08 RX ORDER — PROCHLORPERAZINE EDISYLATE 5 MG/ML
5-10 INJECTION INTRAMUSCULAR; INTRAVENOUS EVERY 4 HOURS PRN
Status: DISCONTINUED | OUTPATIENT
Start: 2024-10-08 | End: 2024-10-09 | Stop reason: HOSPADM

## 2024-10-08 RX ORDER — LOSARTAN POTASSIUM 25 MG/1
12.5 TABLET ORAL DAILY
Qty: 45 TABLET | Refills: 3 | Status: SHIPPED | OUTPATIENT
Start: 2024-10-08

## 2024-10-08 RX ORDER — CLOPIDOGREL BISULFATE 75 MG/1
75 TABLET ORAL DAILY
Status: DISCONTINUED | OUTPATIENT
Start: 2024-10-09 | End: 2024-10-09 | Stop reason: HOSPADM

## 2024-10-08 RX ORDER — ONDANSETRON 2 MG/ML
4 INJECTION INTRAMUSCULAR; INTRAVENOUS EVERY 4 HOURS PRN
Status: DISCONTINUED | OUTPATIENT
Start: 2024-10-08 | End: 2024-10-09 | Stop reason: HOSPADM

## 2024-10-08 RX ORDER — CARVEDILOL 3.12 MG/1
3.12 TABLET ORAL 2 TIMES DAILY WITH MEALS
Qty: 180 TABLET | Refills: 3 | Status: SHIPPED | OUTPATIENT
Start: 2024-10-08

## 2024-10-08 RX ORDER — ASPIRIN 81 MG/1
81 TABLET ORAL DAILY
Status: DISCONTINUED | OUTPATIENT
Start: 2024-10-09 | End: 2024-10-09 | Stop reason: HOSPADM

## 2024-10-08 RX ORDER — DAPAGLIFLOZIN 10 MG/1
10 TABLET, FILM COATED ORAL DAILY
Qty: 90 TABLET | Refills: 3 | Status: SHIPPED | OUTPATIENT
Start: 2024-10-08

## 2024-10-08 RX ORDER — SPIRONOLACTONE 25 MG/1
12.5 TABLET ORAL DAILY
Qty: 45 TABLET | Refills: 3 | Status: SHIPPED | OUTPATIENT
Start: 2024-10-08

## 2024-10-08 RX ADMIN — SODIUM CHLORIDE 1000 ML: 9 INJECTION, SOLUTION INTRAVENOUS at 11:00

## 2024-10-08 RX ADMIN — CARVEDILOL 3.12 MG: 3.12 TABLET, FILM COATED ORAL at 16:48

## 2024-10-08 RX ADMIN — GADOTERIDOL 13 ML: 279.3 INJECTION, SOLUTION INTRAVENOUS at 13:24

## 2024-10-08 RX ADMIN — IOHEXOL 80 ML: 350 INJECTION, SOLUTION INTRAVENOUS at 14:12

## 2024-10-08 RX ADMIN — ATORVASTATIN CALCIUM 40 MG: 40 TABLET, FILM COATED ORAL at 20:17

## 2024-10-08 ASSESSMENT — ENCOUNTER SYMPTOMS
CHILLS: 0
SHORTNESS OF BREATH: 0
FEVER: 0
MYALGIAS: 0
NAUSEA: 0
HEARTBURN: 0
WEAKNESS: 0
COUGH: 0
DIZZINESS: 0
VOMITING: 0

## 2024-10-08 ASSESSMENT — PAIN DESCRIPTION - PAIN TYPE
TYPE: ACUTE PAIN
TYPE: ACUTE PAIN

## 2024-10-08 ASSESSMENT — PATIENT HEALTH QUESTIONNAIRE - PHQ9
1. LITTLE INTEREST OR PLEASURE IN DOING THINGS: NOT AT ALL
SUM OF ALL RESPONSES TO PHQ9 QUESTIONS 1 AND 2: 0
2. FEELING DOWN, DEPRESSED, IRRITABLE, OR HOPELESS: NOT AT ALL

## 2024-10-08 ASSESSMENT — SOCIAL DETERMINANTS OF HEALTH (SDOH)

## 2024-10-08 ASSESSMENT — COGNITIVE AND FUNCTIONAL STATUS - GENERAL
DRESSING REGULAR LOWER BODY CLOTHING: A LITTLE
DRESSING REGULAR UPPER BODY CLOTHING: A LITTLE
MOBILITY SCORE: 24
DAILY ACTIVITIY SCORE: 21
PERSONAL GROOMING: A LITTLE
SUGGESTED CMS G CODE MODIFIER MOBILITY: CH
SUGGESTED CMS G CODE MODIFIER DAILY ACTIVITY: CJ

## 2024-10-08 ASSESSMENT — FIBROSIS 4 INDEX
FIB4 SCORE: 1.22
FIB4 SCORE: 0.56
FIB4 SCORE: 0.56
FIB4 SCORE: 1.22

## 2024-10-09 ENCOUNTER — APPOINTMENT (OUTPATIENT)
Dept: RADIOLOGY | Facility: MEDICAL CENTER | Age: 64
DRG: 312 | End: 2024-10-09
Attending: INTERNAL MEDICINE
Payer: COMMERCIAL

## 2024-10-09 ENCOUNTER — APPOINTMENT (OUTPATIENT)
Dept: CARDIOLOGY | Facility: MEDICAL CENTER | Age: 64
DRG: 312 | End: 2024-10-09
Attending: INTERNAL MEDICINE
Payer: COMMERCIAL

## 2024-10-09 VITALS
RESPIRATION RATE: 18 BRPM | OXYGEN SATURATION: 97 % | WEIGHT: 130.95 LBS | TEMPERATURE: 97.7 F | BODY MASS INDEX: 21.82 KG/M2 | HEART RATE: 79 BPM | DIASTOLIC BLOOD PRESSURE: 65 MMHG | SYSTOLIC BLOOD PRESSURE: 118 MMHG | HEIGHT: 65 IN

## 2024-10-09 LAB
ALBUMIN SERPL BCP-MCNC: 3.3 G/DL (ref 3.2–4.9)
ALBUMIN/GLOB SERPL: 0.8 G/DL
ALP SERPL-CCNC: 107 U/L (ref 30–99)
ALT SERPL-CCNC: 28 U/L (ref 2–50)
ANION GAP SERPL CALC-SCNC: 11 MMOL/L (ref 7–16)
AST SERPL-CCNC: 23 U/L (ref 12–45)
BILIRUB SERPL-MCNC: 0.3 MG/DL (ref 0.1–1.5)
BUN SERPL-MCNC: 19 MG/DL (ref 8–22)
CALCIUM ALBUM COR SERPL-MCNC: 9.5 MG/DL (ref 8.5–10.5)
CALCIUM SERPL-MCNC: 8.9 MG/DL (ref 8.5–10.5)
CHLORIDE SERPL-SCNC: 101 MMOL/L (ref 96–112)
CHOLEST SERPL-MCNC: 123 MG/DL (ref 100–199)
CO2 SERPL-SCNC: 21 MMOL/L (ref 20–33)
CREAT SERPL-MCNC: 0.91 MG/DL (ref 0.5–1.4)
ERYTHROCYTE [DISTWIDTH] IN BLOOD BY AUTOMATED COUNT: 41.9 FL (ref 35.9–50)
GFR SERPLBLD CREATININE-BSD FMLA CKD-EPI: 95 ML/MIN/1.73 M 2
GLOBULIN SER CALC-MCNC: 3.9 G/DL (ref 1.9–3.5)
GLUCOSE SERPL-MCNC: 103 MG/DL (ref 65–99)
HCT VFR BLD AUTO: 33.4 % (ref 42–52)
HDLC SERPL-MCNC: 25 MG/DL
HGB BLD-MCNC: 11.1 G/DL (ref 14–18)
LDLC SERPL CALC-MCNC: 76 MG/DL
MCH RBC QN AUTO: 29.1 PG (ref 27–33)
MCHC RBC AUTO-ENTMCNC: 33.2 G/DL (ref 32.3–36.5)
MCV RBC AUTO: 87.4 FL (ref 81.4–97.8)
PLATELET # BLD AUTO: 600 K/UL (ref 164–446)
PMV BLD AUTO: 8.7 FL (ref 9–12.9)
POTASSIUM SERPL-SCNC: 4 MMOL/L (ref 3.6–5.5)
PROT SERPL-MCNC: 7.2 G/DL (ref 6–8.2)
RBC # BLD AUTO: 3.82 M/UL (ref 4.7–6.1)
SODIUM SERPL-SCNC: 133 MMOL/L (ref 135–145)
TRIGL SERPL-MCNC: 109 MG/DL (ref 0–149)
WBC # BLD AUTO: 8.8 K/UL (ref 4.8–10.8)

## 2024-10-09 PROCEDURE — 92523 SPEECH SOUND LANG COMPREHEN: CPT

## 2024-10-09 PROCEDURE — 80061 LIPID PANEL: CPT

## 2024-10-09 PROCEDURE — 85027 COMPLETE CBC AUTOMATED: CPT

## 2024-10-09 PROCEDURE — 71045 X-RAY EXAM CHEST 1 VIEW: CPT

## 2024-10-09 PROCEDURE — 80053 COMPREHEN METABOLIC PANEL: CPT

## 2024-10-09 PROCEDURE — 700102 HCHG RX REV CODE 250 W/ 637 OVERRIDE(OP): Performed by: STUDENT IN AN ORGANIZED HEALTH CARE EDUCATION/TRAINING PROGRAM

## 2024-10-09 PROCEDURE — 99239 HOSP IP/OBS DSCHRG MGMT >30: CPT | Performed by: INTERNAL MEDICINE

## 2024-10-09 PROCEDURE — A9270 NON-COVERED ITEM OR SERVICE: HCPCS | Performed by: STUDENT IN AN ORGANIZED HEALTH CARE EDUCATION/TRAINING PROGRAM

## 2024-10-09 PROCEDURE — 99222 1ST HOSP IP/OBS MODERATE 55: CPT | Performed by: INTERNAL MEDICINE

## 2024-10-09 PROCEDURE — 93308 TTE F-UP OR LMTD: CPT

## 2024-10-09 PROCEDURE — 93308 TTE F-UP OR LMTD: CPT | Mod: 26 | Performed by: STUDENT IN AN ORGANIZED HEALTH CARE EDUCATION/TRAINING PROGRAM

## 2024-10-09 RX ORDER — LOSARTAN POTASSIUM 25 MG/1
12.5 TABLET ORAL EVERY EVENING
Status: DISCONTINUED | OUTPATIENT
Start: 2024-10-09 | End: 2024-10-09 | Stop reason: HOSPADM

## 2024-10-09 RX ORDER — CARVEDILOL 3.12 MG/1
3.12 TABLET ORAL DAILY
Status: DISCONTINUED | OUTPATIENT
Start: 2024-10-10 | End: 2024-10-09 | Stop reason: HOSPADM

## 2024-10-09 RX ADMIN — DAPAGLIFLOZIN 10 MG: 10 TABLET, FILM COATED ORAL at 05:49

## 2024-10-09 RX ADMIN — CLOPIDOGREL BISULFATE 75 MG: 75 TABLET ORAL at 05:49

## 2024-10-09 RX ADMIN — ASPIRIN 81 MG: 81 TABLET, COATED ORAL at 05:49

## 2024-10-09 RX ADMIN — CARVEDILOL 3.12 MG: 3.12 TABLET, FILM COATED ORAL at 08:05

## 2024-10-09 ASSESSMENT — ENCOUNTER SYMPTOMS
HEMOPTYSIS: 0
NEAR-SYNCOPE: 1
RESPIRATORY NEGATIVE: 1
ORTHOPNEA: 0
WHEEZING: 0
DIAPHORESIS: 0
HEMATOCHEZIA: 0
PALPITATIONS: 0
DYSPNEA ON EXERTION: 0
FEVER: 0
COUGH: 0
PND: 0

## 2024-10-09 ASSESSMENT — FIBROSIS 4 INDEX: FIB4 SCORE: 0.44

## 2024-11-04 LAB — COMPONENT P 8504P: NORMAL

## 2024-11-04 NOTE — PROGRESS NOTES
"CHIEF COMPLAINT: Post-op visit     PROCEDURE:   9/25/24 Dr. Sheffield  ULTRASOUND GUIDED INSERTION OF LEFT FEMORAL ARTERIAL LINE  CABG X 4  WITH SKELENTONIZED SEQUEIRA TO LAD  RSVG TO DIAG 2  RSVG TO RAMUS  RSVG TO RPDA  WITH ENDOSCOPIC VEIN PROCUREMENT OF RIGHT GREATER SAPHENOUS VEIN - Wound Class: Clean with Drain  ECHOCARDIOGRAM, TRANSESOPHAGEAL, INTRAOPERATIVE - Wound Class: Clean Contaminated       HPI: Patient presents to clinic to clinic for one month post op visit. He states he is walking 6-8 miles per day and riding his bike 20 miles at a time. He denies chest pain, popping and clicking. He plans to return to [facility].     /70 (BP Location: Left arm, Patient Position: Sitting, BP Cuff Size: Adult)   Pulse 85   Temp 36.6 °C (97.9 °F) (Temporal)   Ht 1.651 m (5' 5\")   Wt 64.7 kg (142 lb 9.6 oz)   SpO2 97%     PHYSICAL EXAM:  Cardiac: S1S2  Neuro:  AAO x 3  Resp:  CTA  Wounds:  CDI  Sternum:  StableCardiac: S1S2    PLAN: Patient encouraged to not ride a bicycle for three months. Discharge from cardiac surgery clinic.   Continue plavix for 3 months or per your Cardiologist's recommendations.  We reviewed post operative sternal precautions, weight limits and driving precautions moving forward.  We reviewed SBE prophylaxis.      Overall, we are very pleased with the patient’s progress and we have instructed the patient to follow-up with us in the future should they have any concerns related to their surgery. Otherwise, we will see the patient on a PRN basis. The patient will continue to follow-up with their Cardiologist and PCP.  The patient has been informed that any further prescription refills should be done through their primary care physician and/or cardiologist.  They acknowledged understanding.  Thank you for allowing us to participate in the care of this very pleasant patient and please let us know if there is any way we may be of further assistance.    "

## 2024-11-07 ENCOUNTER — OFFICE VISIT (OUTPATIENT)
Dept: CARDIOTHORACIC SURGERY | Facility: MEDICAL CENTER | Age: 64
End: 2024-11-07
Payer: COMMERCIAL

## 2024-11-07 ENCOUNTER — OFFICE VISIT (OUTPATIENT)
Dept: CARDIOLOGY | Facility: MEDICAL CENTER | Age: 64
End: 2024-11-07
Attending: INTERNAL MEDICINE
Payer: COMMERCIAL

## 2024-11-07 VITALS
SYSTOLIC BLOOD PRESSURE: 112 MMHG | OXYGEN SATURATION: 98 % | HEART RATE: 85 BPM | HEIGHT: 65 IN | DIASTOLIC BLOOD PRESSURE: 50 MMHG | WEIGHT: 141 LBS | RESPIRATION RATE: 16 BRPM | BODY MASS INDEX: 23.49 KG/M2

## 2024-11-07 VITALS
BODY MASS INDEX: 23.76 KG/M2 | DIASTOLIC BLOOD PRESSURE: 70 MMHG | HEIGHT: 65 IN | HEART RATE: 85 BPM | TEMPERATURE: 97.9 F | OXYGEN SATURATION: 97 % | WEIGHT: 142.6 LBS | SYSTOLIC BLOOD PRESSURE: 116 MMHG

## 2024-11-07 DIAGNOSIS — I25.118 CORONARY ARTERY DISEASE OF NATIVE HEART WITH STABLE ANGINA PECTORIS, UNSPECIFIED VESSEL OR LESION TYPE (HCC): ICD-10-CM

## 2024-11-07 DIAGNOSIS — Z95.1 S/P CABG (CORONARY ARTERY BYPASS GRAFT): ICD-10-CM

## 2024-11-07 DIAGNOSIS — I24.0 ISCHEMIC HEART DISEASE DUE TO CORONARY ARTERY OBSTRUCTION (HCC): ICD-10-CM

## 2024-11-07 DIAGNOSIS — I25.9 ISCHEMIC HEART DISEASE DUE TO CORONARY ARTERY OBSTRUCTION (HCC): ICD-10-CM

## 2024-11-07 DIAGNOSIS — E78.5 DYSLIPIDEMIA: ICD-10-CM

## 2024-11-07 DIAGNOSIS — I50.20 HEART FAILURE WITH REDUCED EJECTION FRACTION (HCC): ICD-10-CM

## 2024-11-07 DIAGNOSIS — I10 PRIMARY HYPERTENSION: ICD-10-CM

## 2024-11-07 DIAGNOSIS — I25.5 ISCHEMIC CARDIOMYOPATHY: ICD-10-CM

## 2024-11-07 PROCEDURE — 99214 OFFICE O/P EST MOD 30 MIN: CPT | Performed by: INTERNAL MEDICINE

## 2024-11-07 PROCEDURE — 3074F SYST BP LT 130 MM HG: CPT | Performed by: INTERNAL MEDICINE

## 2024-11-07 PROCEDURE — 3074F SYST BP LT 130 MM HG: CPT | Performed by: NURSE PRACTITIONER

## 2024-11-07 PROCEDURE — 3078F DIAST BP <80 MM HG: CPT | Performed by: INTERNAL MEDICINE

## 2024-11-07 PROCEDURE — 99212 OFFICE O/P EST SF 10 MIN: CPT | Performed by: INTERNAL MEDICINE

## 2024-11-07 PROCEDURE — 3078F DIAST BP <80 MM HG: CPT | Performed by: NURSE PRACTITIONER

## 2024-11-07 PROCEDURE — 99024 POSTOP FOLLOW-UP VISIT: CPT | Performed by: NURSE PRACTITIONER

## 2024-11-07 RX ORDER — LOSARTAN POTASSIUM 25 MG/1
25 TABLET ORAL DAILY
Qty: 90 TABLET | Refills: 3 | Status: SHIPPED | OUTPATIENT
Start: 2024-11-07

## 2024-11-07 RX ORDER — ROSUVASTATIN CALCIUM 40 MG/1
40 TABLET, COATED ORAL DAILY
Qty: 30 TABLET | Refills: 3 | Status: SHIPPED | OUTPATIENT
Start: 2024-11-07

## 2024-11-07 ASSESSMENT — FIBROSIS 4 INDEX
FIB4 SCORE: 0.44
FIB4 SCORE: 0.44

## 2024-11-07 NOTE — PROGRESS NOTES
Cardiology Follow Up Consultation Note    Date of note:    11/7/2024    Primary Care Provider: Litzy Kruger M.D.  Referring Provider: Pierre Sheffield, *    Patient Name: Gold Bustamante     YOB: 1960  MRN:              [id number]    Chief Complaint   Patient presents with    Follow-Up     F/v Dx: Ischemic heart disease due to coronary artery obstruction (HCC)      MI (Non ST Segment Elevation MI)    Coronary Artery Bypass Graft (CABG)     S/P CABG (coronary artery bypass graft)       History of Present Illness: Mr. Gold Bustamante is a 63-year-old man with past medical history significant for dyslipidemia, tobacco use, CAD status post CABG, ischemic cardiomyopathy HFrEF EF 25% who presents to the cardiology office for follow-up.     Patient was recently seen in office on 10/8/2020 for to establish care after his hospitalization with NSTEMI subsequently resulting in CABG x 4. In September 2024, he was admitted to [facility] with NSTEMI.  Cardiac cath showed severe multivessel CAD.  Subsequently transferred to [facility] for bypass surgery.  Patient underwent successful CABG x 4 with LIMA to LAD, SVG to diagonal 2, SVG to ramus, SVG to RPDA on 9/25/2024.  Echocardiogram performed during surgery revealed severe left ventricular systolic dysfunction with an EF of 25%.      During his office visit on 10/8/2024, patient had significant orthostatic hypotension.  Noted to have blood pressure of 60/40 mmHg with associated lightheadedness.  Had transient loss of consciousness in the office and subsequently sent by EMS to the ER for evaluation.  He had a MRI brain imaging performed which showed findings of subacute infarcts.  He was given IV fluids and adjustments were made to his BP medications with stabilization of blood pressure.    Interval history:  Today, he presents for follow-up.  He is doing well post hospital discharge.  He denies having exertional chest pain or  dyspnea.  He states that he has been walking frequently at home able to walk several miles a day without symptoms of angina.  He has been compliant with his cardiac medications without lightheadedness/dizziness or episodes of syncope.    Cardiovascular Risk Factors:  1. Smoking status: Former smoker  2. Type II Diabetes Mellitus: Denies   Lab Results   Component Value Date/Time    HBA1C 6.1 (H) 10/08/2024 10:35 AM    HBA1C 6.0 (H) 09/24/2024 08:15 PM     3. Hypertension: Denies   4. Dyslipidemia: Yes   Cholesterol,Tot   Date Value Ref Range Status   10/09/2024 123 100 - 199 mg/dL Final     LDL   Date Value Ref Range Status   10/09/2024 76 <100 mg/dL Final     HDL   Date Value Ref Range Status   10/09/2024 25 (A) >=40 mg/dL Final     Triglycerides   Date Value Ref Range Status   10/09/2024 109 0 - 149 mg/dL Final     5. Family history of early Coronary Artery Disease in a first degree relative (Male less than 55 years of age; Female less than 65 years of age): Denies       Review of Systems:  As per HPI. All other systems reviewed and are negative.      History reviewed. No pertinent past medical history.      Past Surgical History:   Procedure Laterality Date    MULTIPLE CORONARY ARTERY BYPASS ENDO VEIN HARVEST  9/25/2024    Procedure: CABG X 4, WITH ENDOSCOPIC VEIN PROCUREMENT;  Surgeon: Pierre Sheffield D.O.;  Location: SURGERY [facility];  Service: Cardiothoracic    ECHOCARDIOGRAM, TRANSESOPHAGEAL, INTRAOPERATIVE  9/25/2024    Procedure: ECHOCARDIOGRAM, TRANSESOPHAGEAL, INTRAOPERATIVE;  Surgeon: Pierre Sheffield D.O.;  Location: SURGERY [facility];  Service: Cardiothoracic         Current Outpatient Medications   Medication Sig Dispense Refill    rosuvastatin (CRESTOR) 40 MG tablet Take 1 Tablet by mouth every day. 30 Tablet 3    losartan (COZAAR) 25 MG Tab Take 1 Tablet by mouth every day. 90 Tablet 3    aspirin 81 MG EC tablet Take 1 Tablet by mouth every day. 100 Tablet 3    carvedilol (COREG)  3.125 MG Tab Take 1 Tablet by mouth 2 times a day with meals. 180 Tablet 3    clopidogrel (PLAVIX) 75 MG Tab Take 1 Tablet by mouth every day. 90 Tablet 3    dapagliflozin propanediol (FARXIGA) 10 MG Tab Take 1 Tablet by mouth every day. 90 Tablet 3    spironolactone (ALDACTONE) 25 MG Tab Take 0.5 Tablets by mouth every day. 45 Tablet 3    acetaminophen (TYLENOL) 500 MG Tab Take 2 Tablets by mouth every 6 hours as needed for Moderate Pain or Mild Pain.       No current facility-administered medications for this visit.         No Known Allergies      History reviewed. No pertinent family history.      Social History     Socioeconomic History    Marital status: Single     Spouse name: Not on file    Number of children: Not on file    Years of education: Not on file    Highest education level: Not on file   Occupational History    Not on file   Tobacco Use    Smoking status: Former     Current packs/day: 0.50     Average packs/day: 0.5 packs/day for 50.4 years (25.2 ttl pk-yrs)     Types: Cigarettes     Start date: 6/12/1974    Smokeless tobacco: Never   Substance and Sexual Activity    Alcohol use: Never    Drug use: Never    Sexual activity: Not on file   Other Topics Concern    Not on file   Social History Narrative    Not on file     Social Drivers of Health     Financial Resource Strain: Low Risk  (9/21/2024)    Received from [employer]    Overall Financial Resource Strain (CARDIA)     Difficulty of Paying Living Expenses: Not hard at all   Food Insecurity: No Food Insecurity (9/29/2024)    Hunger Vital Sign     Worried About Running Out of Food in the Last Year: Never true     Ran Out of Food in the Last Year: Never true   Transportation Needs: No Transportation Needs (9/29/2024)    PRAPARE - Transportation     Lack of Transportation (Medical): No     Lack of Transportation (Non-Medical): No   Physical Activity: Sufficiently Active (9/21/2024)    Received from [facility]    Exercise Vital Sign      "Days of Exercise per Week: 5 days     Minutes of Exercise per Session: 60 min   Stress: No Stress Concern Present (9/21/2024)    Received from [facility]    [place] [employer] - Occupational Stress Questionnaire     Feeling of Stress : Not at all   Social Connections: Socially Isolated (9/21/2024)    Received from [facility]    Social Connection and Isolation Panel [NHANES]     Frequency of Communication with Friends and Family: Once a week     Frequency of Social Gatherings with Friends and Family: Once a week     Attends Christianity Services: Never     Active Member of Clubs or Organizations: No     Attends Club or Organization Meetings: Never     Marital Status: Never    Intimate Partner Violence: Not At Risk (10/8/2024)    Humiliation, Afraid, Rape, and Kick questionnaire     Fear of Current or Ex-Partner: No     Emotionally Abused: No     Physically Abused: No     Sexually Abused: No   Housing Stability: Low Risk  (9/29/2024)    Housing Stability Vital Sign     Unable to Pay for Housing in the Last Year: No     Number of Times Moved in the Last Year: 1     Homeless in the Last Year: No         Physical Exam:  Ambulatory Vitals  /50 (BP Location: Left arm, Patient Position: Sitting, BP Cuff Size: Adult)   Pulse 85   Resp 16   Ht 1.651 m (5' 5\")   Wt 64 kg (141 lb)   SpO2 98%    Oxygen Therapy:  Pulse Oximetry: 98 %  BP Readings from Last 4 Encounters:   11/07/24 112/50   10/09/24 118/65   10/08/24 90/60   09/30/24 113/66       Weight/BMI: Body mass index is 23.46 kg/m².  Wt Readings from Last 4 Encounters:   11/07/24 64 kg (141 lb)   10/09/24 59.4 kg (130 lb 15.3 oz)   10/08/24 59.6 kg (131 lb 6.4 oz)   09/30/24 65.5 kg (144 lb 6.4 oz)         General: Not in acute distress  HEENT: OP clear   Neck:  No carotid bruits, No JVD appreciated  CVS:  RRR, Normal S1, S2. No murmurs, rubs or gallops  Resp: Normal respiratory effort, lungs CTA bilaterally. No rales or " rhonchi  Abdomen: Soft, non-distended, non-tender to palpation  Skin: No obvious rashes, no cyanosis  Neurological: Alert and oriented x3, moves all extremities, no focal neurologic deficits  Psychiatric: Appropriate affect  Extremities:   Extremities warm, pulses intact, no edema      Lab Data Review:  Lab Results   Component Value Date/Time    CHOLSTRLTOT 123 10/09/2024 02:25 AM    LDL 76 10/09/2024 02:25 AM    HDL 25 (A) 10/09/2024 02:25 AM    TRIGLYCERIDE 109 10/09/2024 02:25 AM       Lab Results   Component Value Date/Time    SODIUM 133 (L) 10/09/2024 02:25 AM    POTASSIUM 4.0 10/09/2024 02:25 AM    CHLORIDE 101 10/09/2024 02:25 AM    CO2 21 10/09/2024 02:25 AM    GLUCOSE 103 (H) 10/09/2024 02:25 AM    BUN 19 10/09/2024 02:25 AM    CREATININE 0.91 10/09/2024 02:25 AM    BUNCREATRAT 18.2 09/22/2024 03:25 AM     Lab Results   Component Value Date/Time    ALKPHOSPHAT 107 (H) 10/09/2024 02:25 AM    ASTSGOT 23 10/09/2024 02:25 AM    ALTSGPT 28 10/09/2024 02:25 AM    TBILIRUBIN 0.3 10/09/2024 02:25 AM      Lab Results   Component Value Date/Time    WBC 8.8 10/09/2024 02:25 AM    HEMOGLOBIN 11.1 (L) 10/09/2024 02:25 AM     Lab Results   Component Value Date/Time    HBA1C 6.1 (H) 10/08/2024 10:35 AM    HBA1C 6.0 (H) 09/24/2024 08:15 PM         Cardiac Imaging and Procedures Review:    EKG dated 9/26/2024:   My personal interpretation is sinus tachycardia     MELISSA 9/25/2024:  Proximal aorta 2.54 cm  Sinuses 3.6 and stj 2.93.   Structurally normal tricuspid valve without significant stenosis or   regurgitation.   Trileaflet aortic valve. Mild AI.   Trace MR.   EF poor initially 15%  ischemic cardiomyopathy, dilated.  Post CABGx 4   EF= 25% with persistent septal.   Hypokinesis.     TTE 9/22/2024 ([place]):  1. Mild concentric LVH. Severe global hypokinesis with an LVEF of 25%. There is no ventricular septal defect visualized. No left ventricle thrombus noted on this study. There is grade 3 (restrictive) diastolic  dysfunction with markedly elevated LV filling pressures.   2. Left atrium is dilated at 46 ml/m2.   3. The Aortic valve is mildly sclerotic without stenosis. AV appears trileaflet. Mild to moderate aortic regurgitation. PHT is 373 ms.   4. Mild mitral regurgitation.   5. Aortic root is dilated at 3.9 cm.   6. The right ventricle is normal in size and function. Estimated RVSP = 24 mmHg (RAP = 15 mmHg).      Cardiac Cath ([place]) 9/22/2024:  LM is calcified and has diffuse long 80% stenosis.   LAD is calcified and has 80% stenosis in proximal-mid segment. Mid-distal LAD looks patent and looks a good target for LIMA. It gives 3 diagonal branches, which look patent.   Ramus looks patent without significant disease.   LCX has 70-80% focal stenosis in mid segment. It gives 2 OM branches, which look patent   RCA is  in mid segment. There are right to right and left to right collaterals filling the distal RCA. LVEDP was measured at 11 mmHg         Assessment & Plan     1. Ischemic heart disease due to coronary artery obstruction (HCC)        2. S/P CABG (coronary artery bypass graft)        3. Ischemic cardiomyopathy        4. Heart failure with reduced ejection fraction (HCC)  losartan (COZAAR) 25 MG Tab      5. Primary hypertension        6. Dyslipidemia  rosuvastatin (CRESTOR) 40 MG tablet            Medical Decision Making:  Mr. Gold Bustamante is a 63-year-old man with past medical history significant for dyslipidemia, tobacco use, CAD status post CABG, ischemic cardiomyopathy HFrEF EF 25% who presents to the cardiology office for follow-up.    1. Ischemic heart disease due to coronary artery obstruction (HCC)  2. S/P CABG (coronary artery bypass graft)  -Severe multivessel disease with LMCA involvement.  Status post CABG x 4 with LIMA to LAD, SVG to ramus, SVG to D2, SVG to RPDA.  -Currently stable from CV standpoint.  No symptoms of angina.  -Continue antiplatelet therapy with aspirin and Plavix for vein graft  patency  -Continue current low-dose beta-blocker therapy with carvedilol  -Switch statin therapy from atorvastatin 40 mg daily to rosuvastatin 40 mg daily.    3. Ischemic cardiomyopathy  4. Heart failure with reduced ejection fraction (HCC)  -Ischemic cardiomyopathy, HFrEF LVEF 25%, NYHA class I-II, stage C  -Euvolemic.  No need for diuretics  -Currently well compensated.  Given significant hypotension during his last office visit, we will uptitrate GDMT slowly.  -Increase losartan to 25 mg daily.  Continue current dose of carvedilol and spironolactone.  Continue Farxiga 10 mg daily  -During next office visit, if BP remains stable, we will further increase ARB as well as MRA therapy.    5. Primary hypertension  -Stable.  Continue losartan, Coreg and spironolactone as above.    6. Dyslipidemia  -He is more aggressive lipid-lowering therapy.  Goal LDL should be less than 70.  Switch atorvastatin to maximal dose of rosuvastatin 40 mg daily.  We will check a lipid panel in approximately 2-3 months.    () Today's E/M visit is associated with medical care services that serve as the continuing focal point for all needed health care services and/or with medical care services that  are part of ongoing care related to a patient's single, serious condition, or a complex condition: This includes  furnishing services to patients on an ongoing basis that result in care that is personalized  to the patient. The services result in a comprehensive, longitudinal, and continuous  relationship with the patient and involve delivery of team-based care that is accessible, coordinated with other practitioners and providers, and integrated with the broader health care landscape.       It was a pleasure seeing Mr. Gold Bustamante in the office today. Return in about 6 weeks (around 12/19/2024) for Coronary artery disease, Heart failure. Patient is aware to call the cardiology clinic with any questions or concerns.      Agustin Jamil MD,  Merged with [facility]  Cardiologist, [facility], [address]  [address], [place] [address]  [place] [address]  Phone: [phone]  Fax: [phone]    Please note that this dictation was created using voice recognition software. I have made every reasonable attempt to correct obvious errors, but it is possible there are errors of grammar and possibly content that I did not discover before finalizing the note.

## 2024-12-19 ENCOUNTER — APPOINTMENT (OUTPATIENT)
Dept: RADIOLOGY | Facility: MEDICAL CENTER | Age: 64
End: 2024-12-19
Attending: FAMILY MEDICINE
Payer: COMMERCIAL

## 2024-12-19 DIAGNOSIS — I25.10 CVD (CARDIOVASCULAR DISEASE): ICD-10-CM

## 2024-12-19 PROCEDURE — 700117 HCHG RX CONTRAST REV CODE 255: Mod: JZ

## 2024-12-19 PROCEDURE — 70553 MRI BRAIN STEM W/O & W/DYE: CPT

## 2024-12-19 PROCEDURE — A9579 GAD-BASE MR CONTRAST NOS,1ML: HCPCS | Mod: JZ

## 2024-12-19 RX ADMIN — GADOTERIDOL 11 ML: 279.3 INJECTION, SOLUTION INTRAVENOUS at 11:05

## 2025-01-02 ENCOUNTER — OFFICE VISIT (OUTPATIENT)
Dept: CARDIOLOGY | Facility: MEDICAL CENTER | Age: 65
End: 2025-01-02
Attending: INTERNAL MEDICINE
Payer: COMMERCIAL

## 2025-01-02 VITALS
OXYGEN SATURATION: 97 % | HEIGHT: 65 IN | SYSTOLIC BLOOD PRESSURE: 128 MMHG | BODY MASS INDEX: 24.16 KG/M2 | DIASTOLIC BLOOD PRESSURE: 78 MMHG | WEIGHT: 145 LBS | HEART RATE: 90 BPM

## 2025-01-02 DIAGNOSIS — I50.20 HEART FAILURE WITH REDUCED EJECTION FRACTION (HCC): ICD-10-CM

## 2025-01-02 DIAGNOSIS — I25.9 ISCHEMIC HEART DISEASE DUE TO CORONARY ARTERY OBSTRUCTION (HCC): ICD-10-CM

## 2025-01-02 DIAGNOSIS — Z95.1 S/P CABG (CORONARY ARTERY BYPASS GRAFT): ICD-10-CM

## 2025-01-02 DIAGNOSIS — I10 PRIMARY HYPERTENSION: ICD-10-CM

## 2025-01-02 DIAGNOSIS — I25.5 ISCHEMIC CARDIOMYOPATHY: ICD-10-CM

## 2025-01-02 DIAGNOSIS — E78.5 DYSLIPIDEMIA: ICD-10-CM

## 2025-01-02 DIAGNOSIS — I24.0 ISCHEMIC HEART DISEASE DUE TO CORONARY ARTERY OBSTRUCTION (HCC): ICD-10-CM

## 2025-01-02 PROCEDURE — 99214 OFFICE O/P EST MOD 30 MIN: CPT | Performed by: INTERNAL MEDICINE

## 2025-01-02 PROCEDURE — 99212 OFFICE O/P EST SF 10 MIN: CPT | Performed by: INTERNAL MEDICINE

## 2025-01-02 PROCEDURE — 3078F DIAST BP <80 MM HG: CPT | Performed by: INTERNAL MEDICINE

## 2025-01-02 PROCEDURE — 3074F SYST BP LT 130 MM HG: CPT | Performed by: INTERNAL MEDICINE

## 2025-01-02 RX ORDER — CARVEDILOL 6.25 MG/1
6.25 TABLET ORAL 2 TIMES DAILY WITH MEALS
Qty: 180 TABLET | Refills: 3 | Status: SHIPPED | OUTPATIENT
Start: 2025-01-02

## 2025-01-02 RX ORDER — SPIRONOLACTONE 25 MG/1
25 TABLET ORAL DAILY
Qty: 90 TABLET | Refills: 3 | Status: SHIPPED | OUTPATIENT
Start: 2025-01-02

## 2025-01-02 ASSESSMENT — FIBROSIS 4 INDEX: FIB4 SCORE: 0.45

## 2025-01-02 NOTE — PROGRESS NOTES
Cardiology Follow Up Consultation Note    Date of note:    1/2/2025  Primary Care Provider: Litzy Kruger M.D.  Referring Provider: Pierre Sheffield, *    Patient Name: Gold Bustamante     YOB: 1960  MRN:              [id number]    Chief Complaint   Patient presents with    MI (Non ST Segment Elevation MI)     F/V Dx: NSTEMI (non-ST elevated myocardial infarction) (HCC)    Cardiomyopathy (Ischemic)    Other     F/V Dx: Ischemic heart disease due to coronary artery obstruction (HCC)       History of Present Illness: Mr. Gold Bustamante is a 64-year-old man with past medical history significant for dyslipidemia, tobacco use, CAD status post CABG, ischemic cardiomyopathy HFrEF EF 25% who presents to the cardiology office for follow-up.     Patient was initially seen in office on 10/8/2024 to establish care after his hospitalization with NSTEMI subsequently resulting in CABG x 4. In September 2024, he was admitted to [facility] with NSTEMI.  Cardiac cath showed severe multivessel CAD.  Subsequently transferred to [facility] for bypass surgery.  Patient underwent successful CABG x 4 with LIMA to LAD, SVG to diagonal 2, SVG to ramus, SVG to RPDA on 9/25/2024.  Echocardiogram performed during surgery revealed severe left ventricular systolic dysfunction with an EF of 25%.      On 10/8/2024 office visit, patient had significant orthostatic hypotension--60/40 mmHg with associated lightheadedness.  Had transient loss of consciousness in the office and subsequently sent by EMS to the ER for evaluation.  He had a MRI brain imaging performed which showed findings of subacute infarcts.    Interval history:  He presents today for follow-up.  He notes compliance with his cardiac medications.  No recurrent episodes of hypotension or syncope.  He denies having angina or exertional dyspnea.  No major bleeding issues while on dual antiplatelet therapy.  Denies having hemoptysis or  hematemesis.      Cardiovascular Risk Factors:  1. Smoking status: Former smoker  2. Type II Diabetes Mellitus: Denies   Lab Results   Component Value Date/Time    HBA1C 6.1 (H) 10/08/2024 10:35 AM    HBA1C 6.0 (H) 09/24/2024 08:15 PM     3. Hypertension: Denies   4. Dyslipidemia: Yes   Cholesterol,Tot   Date Value Ref Range Status   10/09/2024 123 100 - 199 mg/dL Final     LDL   Date Value Ref Range Status   10/09/2024 76 <100 mg/dL Final     HDL   Date Value Ref Range Status   10/09/2024 25 (A) >=40 mg/dL Final     Triglycerides   Date Value Ref Range Status   10/09/2024 109 0 - 149 mg/dL Final     5. Family history of early Coronary Artery Disease in a first degree relative (Male less than 55 years of age; Female less than 65 years of age): Denies       Review of Systems:  As per HPI.  Review of systems assessed and are negative except as stated above.      History reviewed. No pertinent past medical history.      Past Surgical History:   Procedure Laterality Date    MULTIPLE CORONARY ARTERY BYPASS ENDO VEIN HARVEST  9/25/2024    Procedure: CABG X 4, WITH ENDOSCOPIC VEIN PROCUREMENT;  Surgeon: Pierre Sheffield D.O.;  Location: SURGERY [facility];  Service: Cardiothoracic    ECHOCARDIOGRAM, TRANSESOPHAGEAL, INTRAOPERATIVE  9/25/2024    Procedure: ECHOCARDIOGRAM, TRANSESOPHAGEAL, INTRAOPERATIVE;  Surgeon: Pierre Sheffield D.O.;  Location: SURGERY [facility];  Service: Cardiothoracic         Current Outpatient Medications   Medication Sig Dispense Refill    carvedilol (COREG) 6.25 MG Tab Take 1 Tablet by mouth 2 times a day with meals. 180 Tablet 3    spironolactone (ALDACTONE) 25 MG Tab Take 1 Tablet by mouth every day. 90 Tablet 3    rosuvastatin (CRESTOR) 40 MG tablet Take 1 Tablet by mouth every day. 30 Tablet 3    losartan (COZAAR) 25 MG Tab Take 1 Tablet by mouth every day. 90 Tablet 3    aspirin 81 MG EC tablet Take 1 Tablet by mouth every day. 100 Tablet 3    clopidogrel (PLAVIX) 75 MG Tab Take 1  Tablet by mouth every day. 90 Tablet 3    dapagliflozin propanediol (FARXIGA) 10 MG Tab Take 1 Tablet by mouth every day. 90 Tablet 3    acetaminophen (TYLENOL) 500 MG Tab Take 2 Tablets by mouth every 6 hours as needed for Moderate Pain or Mild Pain.       No current facility-administered medications for this visit.         No Known Allergies      History reviewed. No pertinent family history.      Social History     Socioeconomic History    Marital status: Single     Spouse name: Not on file    Number of children: Not on file    Years of education: Not on file    Highest education level: Not on file   Occupational History    Not on file   Tobacco Use    Smoking status: Former     Current packs/day: 0.50     Average packs/day: 0.5 packs/day for 50.6 years (25.3 ttl pk-yrs)     Types: Cigarettes     Start date: 6/12/1974    Smokeless tobacco: Never   Substance and Sexual Activity    Alcohol use: Yes     Comment: OCC    Drug use: Never    Sexual activity: Not on file   Other Topics Concern    Not on file   Social History Narrative    Not on file     Social Drivers of Health     Financial Resource Strain: Low Risk  (9/21/2024)    Received from [facility]    Overall Financial Resource Strain (CARDIA)     Difficulty of Paying Living Expenses: Not hard at all   Food Insecurity: No Food Insecurity (9/29/2024)    Hunger Vital Sign     Worried About Running Out of Food in the Last Year: Never true     Ran Out of Food in the Last Year: Never true   Transportation Needs: No Transportation Needs (9/29/2024)    PRAPARE - Transportation     Lack of Transportation (Medical): No     Lack of Transportation (Non-Medical): No   Physical Activity: Sufficiently Active (9/21/2024)    Received from [facility]    Exercise Vital Sign     Days of Exercise per Week: 5 days     Minutes of Exercise per Session: 60 min   Stress: No Stress Concern Present (9/21/2024)    Received from [facility]    [facility]  "Occupational Health - Occupational Stress Questionnaire     Feeling of Stress : Not at all   Social Connections: Socially Isolated (9/21/2024)    Received from [facility]    Social Connection and Isolation Panel [NHANES]     Frequency of Communication with Friends and Family: Once a week     Frequency of Social Gatherings with Friends and Family: Once a week     Attends [employer] Services: Never     Active Member of Clubs or Organizations: No     Attends Club or Organization Meetings: Never     Marital Status: Never    Intimate Partner Violence: Not At Risk (10/8/2024)    Humiliation, Afraid, Rape, and Kick questionnaire     Fear of Current or Ex-Partner: No     Emotionally Abused: No     Physically Abused: No     Sexually Abused: No   Housing Stability: Low Risk  (9/29/2024)    Housing Stability Vital Sign     Unable to Pay for Housing in the Last Year: No     Number of Times Moved in the Last Year: 1     Homeless in the Last Year: No         Physical Exam:  Ambulatory Vitals  /78 (BP Location: Left arm, Patient Position: Sitting, BP Cuff Size: Adult)   Pulse 90   Ht 1.651 m (5' 5\")   Wt 65.8 kg (145 lb)   SpO2 97%    Oxygen Therapy:  Pulse Oximetry: 97 %  BP Readings from Last 4 Encounters:   01/02/25 128/78   11/07/24 116/70   11/07/24 112/50   10/09/24 118/65       Weight/BMI: Body mass index is 24.13 kg/m².  Wt Readings from Last 4 Encounters:   01/02/25 65.8 kg (145 lb)   11/07/24 64.7 kg (142 lb 9.6 oz)   11/07/24 64 kg (141 lb)   10/09/24 59.4 kg (130 lb 15.3 oz)         General: Not in acute distress  HEENT: OP clear   Neck:  No carotid bruits, No JVD appreciated  CVS:  RRR, Normal S1, S2. No murmurs  Resp: Normal respiratory effort, lungs CTA bilaterally  Abdomen: Soft, non-distended, non-tender to palpation  Skin: No obvious rashes, no cyanosis  Neurological: Alert and oriented x3, moves all extremities, no focal neurologic deficits  Psychiatric: Appropriate affect  Extremities:   " Extremities warm, pulses intact, no edema      Lab Data Review:  Lab Results   Component Value Date/Time    CHOLSTRLTOT 123 10/09/2024 02:25 AM    LDL 76 10/09/2024 02:25 AM    HDL 25 (A) 10/09/2024 02:25 AM    TRIGLYCERIDE 109 10/09/2024 02:25 AM       Lab Results   Component Value Date/Time    SODIUM 133 (L) 10/09/2024 02:25 AM    POTASSIUM 4.0 10/09/2024 02:25 AM    CHLORIDE 101 10/09/2024 02:25 AM    CO2 21 10/09/2024 02:25 AM    GLUCOSE 103 (H) 10/09/2024 02:25 AM    BUN 19 10/09/2024 02:25 AM    CREATININE 0.91 10/09/2024 02:25 AM    BUNCREATRAT 18.2 09/22/2024 03:25 AM     Lab Results   Component Value Date/Time    ALKPHOSPHAT 107 (H) 10/09/2024 02:25 AM    ASTSGOT 23 10/09/2024 02:25 AM    ALTSGPT 28 10/09/2024 02:25 AM    TBILIRUBIN 0.3 10/09/2024 02:25 AM      Lab Results   Component Value Date/Time    WBC 8.8 10/09/2024 02:25 AM    HEMOGLOBIN 11.1 (L) 10/09/2024 02:25 AM     Lab Results   Component Value Date/Time    HBA1C 6.1 (H) 10/08/2024 10:35 AM    HBA1C 6.0 (H) 09/24/2024 08:15 PM         Cardiac Imaging and Procedures Review:    EKG dated 9/26/2024:   My personal interpretation is sinus tachycardia     MELISSA 9/25/2024:  Proximal aorta 2.54 cm  Sinuses 3.6 and stj 2.93.   Structurally normal tricuspid valve without significant stenosis or   regurgitation.   Trileaflet aortic valve. Mild AI.   Trace MR.   EF poor initially 15%  ischemic cardiomyopathy, dilated.  Post CABGx 4   EF= 25% with persistent septal.   Hypokinesis.     TTE 9/22/2024 ([facility]):  1. Mild concentric LVH. Severe global hypokinesis with an LVEF of 25%. There is no ventricular septal defect visualized. No left ventricle thrombus noted on this study. There is grade 3 (restrictive) diastolic dysfunction with markedly elevated LV filling pressures.   2. Left atrium is dilated at 46 ml/m2.   3. The Aortic valve is mildly sclerotic without stenosis. AV appears trileaflet. Mild to moderate aortic regurgitation. PHT is 373 ms.   4. Mild  mitral regurgitation.   5. Aortic root is dilated at 3.9 cm.   6. The right ventricle is normal in size and function. Estimated RVSP = 24 mmHg (RAP = 15 mmHg).      Cardiac Cath ([place]) 9/22/2024:  LM is calcified and has diffuse long 80% stenosis.   LAD is calcified and has 80% stenosis in proximal-mid segment. Mid-distal LAD looks patent and looks a good target for LIMA. It gives 3 diagonal branches, which look patent.   Ramus looks patent without significant disease.   LCX has 70-80% focal stenosis in mid segment. It gives 2 OM branches, which look patent   RCA is  in mid segment. There are right to right and left to right collaterals filling the distal RCA. LVEDP was measured at 11 mmHg         Assessment & Plan     1. Ischemic heart disease due to coronary artery obstruction (HCC)  Lipid Profile      2. S/P CABG (coronary artery bypass graft)  carvedilol (COREG) 6.25 MG Tab      3. Ischemic cardiomyopathy        4. Heart failure with reduced ejection fraction (HCC)  carvedilol (COREG) 6.25 MG Tab    spironolactone (ALDACTONE) 25 MG Tab      5. Dyslipidemia        6. Primary hypertension              Medical Decision Making:  Mr. Gold Bustamante is a 64-year-old man with past medical history significant for dyslipidemia, tobacco use, CAD status post CABG, ischemic cardiomyopathy HFrEF EF 25% who presents to the cardiology office for follow-up.    1. Ischemic heart disease due to coronary artery obstruction (HCC)  2. S/P CABG (coronary artery bypass graft)  -Severe multivessel CAD with left main involvement status post CABG x 4.  Had LIMA to LAD, SVG to ramus, SVG to D2 and SVG to RPDA.  -Doing well from cardiovascular standpoint.  Stable without recurrent angina.  Continue dual antiplatelet therapy with aspirin and Plavix for vein graft patency.  -Continue high intensity rosuvastatin 40 mg daily.  Beta-blocker with carvedilol.  Check lipid panel to ensure LDL is at goal of less than 70.    3. Ischemic  cardiomyopathy  4. Heart failure with reduced ejection fraction (HCC)  -Ischemic cardiomyopathy, NYHA class I-2, stage C  -HFrEF LVEF 25%.  Euvolemic and well compensated.  Given stable blood pressure, increase carvedilol to 6.25 mg twice daily and spironolactone to 25 mg daily.  -Continue GDMT with Farxiga 10 mg daily, losartan 25 mg daily.  -Increase neuromotor blockade further during next office visit if blood pressure remains stable.  -Once on maximally tolerated doses of GDMT, check echocardiogram for assessment of LV recovery.    5. Dyslipidemia  -Check lipid panel to ensure LDL is at goal.  Previously suboptimal.  Now on higher doses of statin therapy with rosuvastatin 40 mg daily.  If still not at goal, start Farxiga 10 mg daily.    6. Primary hypertension  -BP is stable.  Continue spironolactone, carvedilol and losartan as above.    () Today's E/M visit is associated with medical care services that serve as the continuing focal point for all needed health care services and/or with medical care services that  are part of ongoing care related to a patient's single, serious condition, or a complex condition: This includes  furnishing services to patients on an ongoing basis that result in care that is personalized  to the patient. The services result in a comprehensive, longitudinal, and continuous  relationship with the patient and involve delivery of team-based care that is accessible, coordinated with other practitioners and providers, and integrated with the broader health care landscape.     It was a pleasure seeing Mr. Gold Bustamante in the office today. Return in about 3 months (around 4/2/2025) for Coronary artery disease, Heart failure. Patient is aware to call the cardiology clinic with any questions or concerns.      Agustin Jamil MD, FACC  Cardiologist, [facility]  [facility], [address]  [address] [address]  Phone:  [phone]  Fax: [phone]    Please note that this dictation was created using voice recognition software. I have made every reasonable attempt to correct obvious errors, but it is possible there are errors of grammar and possibly content that I did not discover before finalizing the note.

## 2025-01-03 ENCOUNTER — TELEPHONE (OUTPATIENT)
Dept: CARDIOLOGY | Facility: MEDICAL CENTER | Age: 65
End: 2025-01-03
Payer: COMMERCIAL

## 2025-01-03 DIAGNOSIS — I25.5 ISCHEMIC CARDIOMYOPATHY: ICD-10-CM

## 2025-01-03 DIAGNOSIS — R55 SYNCOPE AND COLLAPSE: ICD-10-CM

## 2025-01-03 NOTE — TELEPHONE ENCOUNTER
Received call from Katelin coordinator with [employer] calling back.     Spoke to Raul. She stated Dr. Kruger wanted to order a heart monitor for a-fib. She is also ordering a CTA head and neck to evaluate the carotids. Dr. Kruger is asking if LUCILA will order heart monitor. Advised will notify him and call them back if he is not. Answered all questions and concerns, appreciative of call.     LUCILA: Please advise on heart monitor. Thanks!

## 2025-01-03 NOTE — TELEPHONE ENCOUNTER
LUCILA  Caller: Raul Brown with Dr. Kruger    Topic/issue: Patient's provider was looking over the OV notes and was advised that LUCILA wanted a Zio for him. Would LUCILA prefer to order the Zio so we have the results or can Dr. Kruger order this for patient? Please call back to update about Zio order.    Callback Number: [phone]    Thank you,  Carli FRANCIS

## 2025-01-03 NOTE — TELEPHONE ENCOUNTER
Agustin WANG M.D.  You5 minutes ago (12:39 PM)     Okay to order ziopatch     Order placed.     Notified patient via [employer].

## 2025-01-14 RX ORDER — EZETIMIBE 10 MG/1
10 TABLET ORAL DAILY
Qty: 90 TABLET | Refills: 3 | Status: SHIPPED | OUTPATIENT
Start: 2025-01-14

## 2025-01-17 ENCOUNTER — TELEPHONE (OUTPATIENT)
Dept: HEALTH INFORMATION MANAGEMENT | Facility: OTHER | Age: 65
End: 2025-01-17
Payer: COMMERCIAL

## 2025-03-19 DIAGNOSIS — E78.5 DYSLIPIDEMIA: ICD-10-CM

## 2025-03-19 RX ORDER — ROSUVASTATIN CALCIUM 40 MG/1
40 TABLET, COATED ORAL DAILY
Qty: 90 TABLET | Refills: 1 | Status: SHIPPED | OUTPATIENT
Start: 2025-03-19

## 2025-03-19 NOTE — TELEPHONE ENCOUNTER
Is the patient due for a refill? Yes    Was the patient seen the last 15 months? Yes    Date of last office visit: 01.02.2025    Does the patient have an upcoming appointment?  Yes   If yes, When? 05.07.2025    Provider to refill:LUCILA    Does the patient have residential Plus and need 100-day supply? (This applies to ALL medications) Patient does not have SCP

## 2025-04-18 ENCOUNTER — TELEPHONE (OUTPATIENT)
Dept: CARDIOLOGY | Facility: MEDICAL CENTER | Age: 65
End: 2025-04-18
Payer: COMMERCIAL

## 2025-05-07 ENCOUNTER — TELEPHONE (OUTPATIENT)
Dept: CARDIOLOGY | Facility: MEDICAL CENTER | Age: 65
End: 2025-05-07

## 2025-05-07 ENCOUNTER — OFFICE VISIT (OUTPATIENT)
Dept: CARDIOLOGY | Facility: MEDICAL CENTER | Age: 65
End: 2025-05-07
Attending: INTERNAL MEDICINE
Payer: COMMERCIAL

## 2025-05-07 VITALS
SYSTOLIC BLOOD PRESSURE: 108 MMHG | HEART RATE: 62 BPM | HEIGHT: 65 IN | RESPIRATION RATE: 16 BRPM | DIASTOLIC BLOOD PRESSURE: 60 MMHG | OXYGEN SATURATION: 97 % | WEIGHT: 145 LBS | BODY MASS INDEX: 24.16 KG/M2

## 2025-05-07 DIAGNOSIS — I25.5 ISCHEMIC CARDIOMYOPATHY: ICD-10-CM

## 2025-05-07 DIAGNOSIS — I24.0 ISCHEMIC HEART DISEASE DUE TO CORONARY ARTERY OBSTRUCTION (HCC): ICD-10-CM

## 2025-05-07 DIAGNOSIS — I50.20 HEART FAILURE WITH REDUCED EJECTION FRACTION (HCC): ICD-10-CM

## 2025-05-07 DIAGNOSIS — E78.5 DYSLIPIDEMIA: ICD-10-CM

## 2025-05-07 DIAGNOSIS — I25.9 ISCHEMIC HEART DISEASE DUE TO CORONARY ARTERY OBSTRUCTION (HCC): ICD-10-CM

## 2025-05-07 DIAGNOSIS — Z95.1 S/P CABG (CORONARY ARTERY BYPASS GRAFT): ICD-10-CM

## 2025-05-07 PROCEDURE — 3074F SYST BP LT 130 MM HG: CPT | Performed by: INTERNAL MEDICINE

## 2025-05-07 PROCEDURE — 3078F DIAST BP <80 MM HG: CPT | Performed by: INTERNAL MEDICINE

## 2025-05-07 PROCEDURE — 99213 OFFICE O/P EST LOW 20 MIN: CPT | Performed by: INTERNAL MEDICINE

## 2025-05-07 PROCEDURE — 99214 OFFICE O/P EST MOD 30 MIN: CPT | Performed by: INTERNAL MEDICINE

## 2025-05-07 RX ORDER — EZETIMIBE 10 MG/1
10 TABLET ORAL DAILY
Qty: 100 TABLET | Refills: 3 | Status: SHIPPED | OUTPATIENT
Start: 2025-05-07

## 2025-05-07 RX ORDER — CARVEDILOL 6.25 MG/1
6.25 TABLET ORAL 2 TIMES DAILY WITH MEALS
Qty: 200 TABLET | Refills: 3 | Status: SHIPPED | OUTPATIENT
Start: 2025-05-07

## 2025-05-07 RX ORDER — SPIRONOLACTONE 25 MG/1
25 TABLET ORAL DAILY
Qty: 100 TABLET | Refills: 3 | Status: SHIPPED | OUTPATIENT
Start: 2025-05-07

## 2025-05-07 RX ORDER — ROSUVASTATIN CALCIUM 40 MG/1
40 TABLET, COATED ORAL DAILY
Qty: 100 TABLET | Refills: 3 | Status: SHIPPED | OUTPATIENT
Start: 2025-05-07

## 2025-05-07 RX ORDER — DAPAGLIFLOZIN 10 MG/1
10 TABLET, FILM COATED ORAL DAILY
Qty: 100 TABLET | Refills: 3 | Status: SHIPPED | OUTPATIENT
Start: 2025-05-07

## 2025-05-07 RX ORDER — SACUBITRIL AND VALSARTAN 24; 26 MG/1; MG/1
1 TABLET, FILM COATED ORAL 2 TIMES DAILY
Qty: 200 TABLET | Refills: 3 | Status: SHIPPED | OUTPATIENT
Start: 2025-05-07

## 2025-05-07 ASSESSMENT — FIBROSIS 4 INDEX: FIB4 SCORE: 0.45

## 2025-05-07 NOTE — TELEPHONE ENCOUNTER
Received [employer] request via [employer]  for sacubitril-valsartan (ENTRESTO) 24-26 MG Tab . (Quantity:180, Day Supply:90)     Insurance: [employer] [facility]  Member ID:  [id number]  BIN: [id number]  PCN: [id number]  Group: [id number]     Ran Test claim via [place] & medication  Must fill fpr 90days at [facility]/mail

## 2025-05-07 NOTE — PROGRESS NOTES
Cardiology Follow Up Consultation Note    Date of note:    5/7/2025  Primary Care Provider: Litzy Kruger M.D.  Referring Provider: Pierre Sheffield, *    Patient Name: Gold Bustamante     YOB: 1960  MRN:              [id number]    Chief Complaint   Patient presents with    MI (Non ST Segment Elevation MI)    Congestive Heart Failure    Coronary Artery Disease     F/v dx: Ischemic heart disease due to coronary artery obstruction (HCC)       History of Present Illness: Mr. Gold Bustamante is a 64-year-old man with past medical history significant for dyslipidemia, tobacco use, CAD status post CABG, ischemic cardiomyopathy HFrEF EF 25% who presents to the cardiology office for follow-up.     Patient was initially seen in office on 10/8/2024 to establish care after his hospitalization with NSTEMI subsequently resulting in CABG x 4. In September 2024, he was admitted to [facility] with NSTEMI.  Cardiac cath showed severe multivessel CAD.  Subsequently transferred to [facility] for bypass surgery.  Patient underwent successful CABG x 4 with LIMA to LAD, SVG to diagonal 2, SVG to ramus, SVG to RPDA on 9/25/2024.  Echocardiogram performed during surgery revealed severe left ventricular systolic dysfunction with an EF of 25%.      On 10/8/2024 office visit, patient had significant orthostatic hypotension--60/40 mmHg with associated lightheadedness.  Had transient loss of consciousness in the office and subsequently sent by EMS to the ER for evaluation.  He had a MRI brain imaging performed which showed findings of subacute infarcts.    During OV on 1/2/2025, he remained stable and Coreg dose was increased.    Interval history:  He presents to the office today for cardiac follow-up.  He has been doing well on higher doses of neurohormonal blockade.  He has not noticed any episodes of hypotension at home.  Denies having angina or anginal equivalent.  No muscle related side  effects with rosuvastatin.  He was started on Zetia 10 mg daily due to suboptimal LDL.      Cardiovascular Risk Factors:  1. Smoking status: Former smoker  2. Type II Diabetes Mellitus: Denies   Lab Results   Component Value Date/Time    HBA1C 6.1 (H) 10/08/2024 10:35 AM    HBA1C 6.0 (H) 09/24/2024 08:15 PM     3. Hypertension: Denies   4. Dyslipidemia: Yes   Cholesterol,Tot   Date Value Ref Range Status   10/09/2024 123 100 - 199 mg/dL Final     LDL   Date Value Ref Range Status   10/09/2024 76 <100 mg/dL Final     HDL   Date Value Ref Range Status   10/09/2024 25 (A) >=40 mg/dL Final     Triglycerides   Date Value Ref Range Status   10/09/2024 109 0 - 149 mg/dL Final     5. Family history of early Coronary Artery Disease in a first degree relative (Male less than 55 years of age; Female less than 65 years of age): Denies       Review of Systems:  As per HPI.  Review of systems assessed and are negative except as stated above.      History reviewed. No pertinent past medical history.      Past Surgical History:   Procedure Laterality Date    MULTIPLE CORONARY ARTERY BYPASS ENDO VEIN HARVEST  9/25/2024    Procedure: CABG X 4, WITH ENDOSCOPIC VEIN PROCUREMENT;  Surgeon: Pierre Sheffield D.O.;  Location: SURGERY [facility];  Service: Cardiothoracic    ECHOCARDIOGRAM, TRANSESOPHAGEAL, INTRAOPERATIVE  9/25/2024    Procedure: ECHOCARDIOGRAM, TRANSESOPHAGEAL, INTRAOPERATIVE;  Surgeon: Pierre Sheffield D.O.;  Location: SURGERY [facility];  Service: Cardiothoracic         Current Outpatient Medications   Medication Sig Dispense Refill    sacubitril-valsartan (ENTRESTO) 24-26 MG Tab Take 1 Tablet by mouth 2 times a day. 200 Tablet 3    carvedilol (COREG) 6.25 MG Tab Take 1 Tablet by mouth 2 times a day with meals. 200 Tablet 3    dapagliflozin propanediol (FARXIGA) 10 MG Tab Take 1 Tablet by mouth every day. 100 Tablet 3    ezetimibe (ZETIA) 10 MG Tab Take 1 Tablet by mouth every day. 100 Tablet 3    rosuvastatin  (CRESTOR) 40 MG tablet Take 1 Tablet by mouth every day. 100 Tablet 3    spironolactone (ALDACTONE) 25 MG Tab Take 1 Tablet by mouth every day. 100 Tablet 3    aspirin 81 MG EC tablet Take 1 Tablet by mouth every day. 100 Tablet 3    clopidogrel (PLAVIX) 75 MG Tab Take 1 Tablet by mouth every day. 90 Tablet 3    acetaminophen (TYLENOL) 500 MG Tab Take 2 Tablets by mouth every 6 hours as needed for Moderate Pain or Mild Pain.       No current facility-administered medications for this visit.         No Known Allergies      History reviewed. No pertinent family history.      Social History     Socioeconomic History    Marital status: Single     Spouse name: Not on file    Number of children: Not on file    Years of education: Not on file    Highest education level: Not on file   Occupational History    Not on file   Tobacco Use    Smoking status: Former     Current packs/day: 0.50     Average packs/day: 0.5 packs/day for 50.9 years (25.5 ttl pk-yrs)     Types: Cigarettes     Start date: 6/12/1974    Smokeless tobacco: Never   Substance and Sexual Activity    Alcohol use: Yes     Comment: OCC    Drug use: Never    Sexual activity: Not on file   Other Topics Concern    Not on file   Social History Narrative    Not on file     Social Drivers of Health     Financial Resource Strain: Low Risk  (9/21/2024)    Received from [facility]    Overall Financial Resource Strain (CARDIA)     Difficulty of Paying Living Expenses: Not hard at all   Food Insecurity: No Food Insecurity (9/29/2024)    Hunger Vital Sign     Worried About Running Out of Food in the Last Year: Never true     Ran Out of Food in the Last Year: Never true   Transportation Needs: No Transportation Needs (9/29/2024)    PRAPARE - Transportation     Lack of Transportation (Medical): No     Lack of Transportation (Non-Medical): No   Physical Activity: Sufficiently Active (9/21/2024)    Received from [facility]    Exercise Vital Sign     Days of Exercise  "per Week: 5 days     Minutes of Exercise per Session: 60 min   Stress: No Stress Concern Present (9/21/2024)    Received from [facility]    [employer] - Occupational Stress Questionnaire     Feeling of Stress : Not at all   Social Connections: Socially Isolated (9/21/2024)    Received from [facility]    Social Connection and Isolation Panel [NHANES]     Frequency of Communication with Friends and Family: Once a week     Frequency of Social Gatherings with Friends and Family: Once a week     Attends Taoist Services: Never     Active Member of Clubs or Organizations: No     Attends Club or Organization Meetings: Never     Marital Status: Never    Intimate Partner Violence: Not At Risk (10/8/2024)    Humiliation, Afraid, Rape, and Kick questionnaire     Fear of Current or Ex-Partner: No     Emotionally Abused: No     Physically Abused: No     Sexually Abused: No   Housing Stability: Low Risk  (9/29/2024)    Housing Stability Vital Sign     Unable to Pay for Housing in the Last Year: No     Number of Times Moved in the Last Year: 1     Homeless in the Last Year: No         Physical Exam:  Ambulatory Vitals  /60 (BP Location: Left arm, Patient Position: Sitting, BP Cuff Size: Adult)   Pulse 62   Resp 16   Ht 1.651 m (5' 5\")   Wt 65.8 kg (145 lb)   SpO2 97%    Oxygen Therapy:  Pulse Oximetry: 97 %  BP Readings from Last 4 Encounters:   05/07/25 108/60   01/02/25 128/78   11/07/24 116/70   11/07/24 112/50       Weight/BMI: Body mass index is 24.13 kg/m².  Wt Readings from Last 4 Encounters:   05/07/25 65.8 kg (145 lb)   01/02/25 65.8 kg (145 lb)   11/07/24 64.7 kg (142 lb 9.6 oz)   11/07/24 64 kg (141 lb)         General: Not in acute distress  HEENT: OP clear   Neck:  No JVD appreciated  CVS:  RRR, Normal S1, S2. No murmurs  Resp: Normal respiratory effort, lungs CTA bilaterally  Abdomen: Soft, non-distended, non-tender to palpation  Skin: No obvious rashes, " no cyanosis  Neurological: Alert and oriented x3, moves all extremities, no focal neurologic deficits  Psychiatric: Appropriate affect  Extremities:   Extremities warm, no edema      Lab Data Review:  Lab Results   Component Value Date/Time    CHOLSTRLTOT 131 01/06/2025 07:40 AM    CHOLSTRLTOT 123 10/09/2024 02:25 AM    LDL 76 10/09/2024 02:25 AM    HDL 35 (L) 01/06/2025 07:40 AM    HDL 25 (A) 10/09/2024 02:25 AM    TRIGLYCERIDE 107 01/06/2025 07:40 AM    TRIGLYCERIDE 109 10/09/2024 02:25 AM       Lab Results   Component Value Date/Time    SODIUM 133 (L) 10/09/2024 02:25 AM    POTASSIUM 4.0 10/09/2024 02:25 AM    CHLORIDE 101 10/09/2024 02:25 AM    CO2 21 10/09/2024 02:25 AM    GLUCOSE 103 (H) 10/09/2024 02:25 AM    BUN 19 10/09/2024 02:25 AM    CREATININE 0.91 10/09/2024 02:25 AM    BUNCREATRAT 18.2 09/22/2024 03:25 AM     Lab Results   Component Value Date/Time    ALKPHOSPHAT 107 (H) 10/09/2024 02:25 AM    ASTSGOT 23 10/09/2024 02:25 AM    ALTSGPT 28 10/09/2024 02:25 AM    TBILIRUBIN 0.3 10/09/2024 02:25 AM      Lab Results   Component Value Date/Time    WBC 8.8 10/09/2024 02:25 AM    HEMOGLOBIN 11.1 (L) 10/09/2024 02:25 AM     Lab Results   Component Value Date/Time    HBA1C 6.1 (H) 10/08/2024 10:35 AM    HBA1C 6.0 (H) 09/24/2024 08:15 PM         Cardiac Imaging and Procedures Review:    EKG dated 9/26/2024:   My personal interpretation is sinus tachycardia     MELISSA 9/25/2024:  Proximal aorta 2.54 cm  Sinuses 3.6 and stj 2.93.   Structurally normal tricuspid valve without significant stenosis or   regurgitation.   Trileaflet aortic valve. Mild AI.   Trace MR.   EF poor initially 15%  ischemic cardiomyopathy, dilated.  Post CABGx 4   EF= 25% with persistent septal.   Hypokinesis.     TTE 9/22/2024 ([place]):  1. Mild concentric LVH. Severe global hypokinesis with an LVEF of 25%. There is no ventricular septal defect visualized. No left ventricle thrombus noted on this study. There is grade 3 (restrictive)  diastolic dysfunction with markedly elevated LV filling pressures.   2. Left atrium is dilated at 46 ml/m2.   3. The Aortic valve is mildly sclerotic without stenosis. AV appears trileaflet. Mild to moderate aortic regurgitation. PHT is 373 ms.   4. Mild mitral regurgitation.   5. Aortic root is dilated at 3.9 cm.   6. The right ventricle is normal in size and function. Estimated RVSP = 24 mmHg (RAP = 15 mmHg).      Cardiac Cath ([place]) 9/22/2024:  LM is calcified and has diffuse long 80% stenosis.   LAD is calcified and has 80% stenosis in proximal-mid segment. Mid-distal LAD looks patent and looks a good target for LIMA. It gives 3 diagonal branches, which look patent.   Ramus looks patent without significant disease.   LCX has 70-80% focal stenosis in mid segment. It gives 2 OM branches, which look patent   RCA is  in mid segment. There are right to right and left to right collaterals filling the distal RCA. LVEDP was measured at 11 mmHg         Assessment & Plan     1. Ischemic heart disease due to coronary artery obstruction (HCC)        2. S/P CABG (coronary artery bypass graft)  carvedilol (COREG) 6.25 MG Tab    dapagliflozin propanediol (FARXIGA) 10 MG Tab      3. Ischemic cardiomyopathy  sacubitril-valsartan (ENTRESTO) 24-26 MG Tab    EC-ECHOCARDIOGRAM COMPLETE W/O CONT      4. Heart failure with reduced ejection fraction (HCC)  sacubitril-valsartan (ENTRESTO) 24-26 MG Tab    carvedilol (COREG) 6.25 MG Tab    dapagliflozin propanediol (FARXIGA) 10 MG Tab    spironolactone (ALDACTONE) 25 MG Tab    EC-ECHOCARDIOGRAM COMPLETE W/O CONT      5. Dyslipidemia  ezetimibe (ZETIA) 10 MG Tab    rosuvastatin (CRESTOR) 40 MG tablet    Lipid Profile            Medical Decision Making:  Mr. Gold Bustamante is a 64-year-old man with past medical history significant for dyslipidemia, tobacco use, CAD status post CABG, ischemic cardiomyopathy HFrEF EF 25% who presents to the cardiology office for follow-up.    1. Ischemic  heart disease due to coronary artery obstruction (HCC)  2. S/P CABG (coronary artery bypass graft)  - Known history of CAD status post CABG.  Doing well from CV standpoint.  No symptoms of angina or anginal equivalent.  Continue dual antiplatelet therapy for vein graft patency.  - Lipid-lowering therapy with Zetia and rosuvastatin.  Repeat lipid panel to ensure LDL is at goal.  If not at goal, add on PCSK9 inhibitor therapy.    3. Ischemic cardiomyopathy  4. Heart failure with reduced ejection fraction (HCC)  - Ischemic cardiomyopathy, HFrEF LVEF 25%, NYHA class I-II, stage C  -Euvolemic and well compensated.  Blood pressure is stable.  Stop losartan.  Add on Entresto 24-26 mg twice daily  - Continue GDMT with spironolactone, Farxiga and carvedilol.  - Repeat echocardiogram to assess for LV recovery.    5. Dyslipidemia  -Continue Zetia and rosuvastatin.  Check lipid panel as above.  - Lipid Profile; Future    6. Primary hypertension  - Chronic, stable condition.  BP is at goal of less than 130/80 mmHg.  Continue spironolactone, carvedilol and Entresto as above.      () Today's E/M visit is associated with medical care services that serve as the continuing focal point for all needed health care services and/or with medical care services that  are part of ongoing care related to a patient's single, serious condition, or a complex condition: This includes  furnishing services to patients on an ongoing basis that result in care that is personalized  to the patient. The services result in a comprehensive, longitudinal, and continuous  relationship with the patient and involve delivery of team-based care that is accessible, coordinated with other practitioners and providers, and integrated with the broader health care landscape.       It was a pleasure seeing Mr. Gold Bustamante in the office today. Return in about 4 months (around 9/7/2025) for Heart failure, Coronary artery disease. Patient is aware to call the  cardiology clinic with any questions or concerns.      Agustin Jamil MD, [facility]  Cardiologist, [facility], [facility]  [address] [place] [address], [address] [place] [address]  Phone: [phone]  Fax: [phone]    Please note that this dictation was created using voice recognition software. I have made every reasonable attempt to correct obvious errors, but it is possible there are errors of grammar and possibly content that I did not discover before finalizing the note.

## 2025-05-14 ENCOUNTER — RESULTS FOLLOW-UP (OUTPATIENT)
Dept: CARDIOLOGY | Facility: MEDICAL CENTER | Age: 65
End: 2025-05-14

## 2025-06-24 ENCOUNTER — HOSPITAL ENCOUNTER (OUTPATIENT)
Dept: CARDIOLOGY | Facility: MEDICAL CENTER | Age: 65
End: 2025-06-24
Attending: INTERNAL MEDICINE
Payer: COMMERCIAL

## 2025-06-24 DIAGNOSIS — I50.20 HEART FAILURE WITH REDUCED EJECTION FRACTION (HCC): ICD-10-CM

## 2025-06-24 DIAGNOSIS — I25.5 ISCHEMIC CARDIOMYOPATHY: ICD-10-CM

## 2025-06-24 PROCEDURE — 93306 TTE W/DOPPLER COMPLETE: CPT

## 2025-06-24 PROCEDURE — 700117 HCHG RX CONTRAST REV CODE 255: Performed by: INTERNAL MEDICINE

## 2025-06-24 RX ADMIN — HUMAN ALBUMIN MICROSPHERES AND PERFLUTREN 3 ML: 10; .22 INJECTION, SOLUTION INTRAVENOUS at 17:45

## 2025-06-25 ENCOUNTER — RESULTS FOLLOW-UP (OUTPATIENT)
Dept: CARDIOLOGY | Facility: MEDICAL CENTER | Age: 65
End: 2025-06-25

## 2025-06-25 LAB
LV EJECT FRACT  99904: 50
LV EJECT FRACT MOD 2C 99903: 48.85
LV EJECT FRACT MOD 4C 99902: 52.1
LV EJECT FRACT MOD BP 99901: 50.27

## 2025-06-25 PROCEDURE — 93306 TTE W/DOPPLER COMPLETE: CPT | Mod: 26 | Performed by: INTERNAL MEDICINE

## 2025-08-05 ENCOUNTER — APPOINTMENT (OUTPATIENT)
Dept: URBAN - METROPOLITAN AREA CLINIC 38 | Facility: CLINIC | Age: 65
Setting detail: DERMATOLOGY
End: 2025-08-05

## 2025-08-05 DIAGNOSIS — L28.0 LICHEN SIMPLEX CHRONICUS: ICD-10-CM

## 2025-08-05 DIAGNOSIS — L73.9 FOLLICULAR DISORDER, UNSPECIFIED: ICD-10-CM

## 2025-08-05 PROCEDURE — ? PRESCRIPTION

## 2025-08-05 PROCEDURE — ? COUNSELING

## 2025-08-05 RX ORDER — CLINDAMYCIN PHOSPHATE 10 MG/ML
LOTION TOPICAL
Qty: 60 | Refills: 5 | Status: ERX | COMMUNITY
Start: 2025-08-05

## 2025-08-05 RX ORDER — CEPHALEXIN 500 MG/1
TABLET ORAL BID
Qty: 14 | Refills: 1 | Status: ERX | COMMUNITY
Start: 2025-08-05

## 2025-08-05 RX ADMIN — CLINDAMYCIN PHOSPHATE: 10 LOTION TOPICAL at 00:00

## 2025-08-05 RX ADMIN — CEPHALEXIN: 500 TABLET ORAL at 00:00

## 2025-08-05 ASSESSMENT — LOCATION ZONE DERM: LOCATION ZONE: TRUNK

## 2025-08-05 ASSESSMENT — LOCATION SIMPLE DESCRIPTION DERM
LOCATION SIMPLE: LEFT LOWER BACK
LOCATION SIMPLE: UPPER BACK

## 2025-08-05 ASSESSMENT — LOCATION DETAILED DESCRIPTION DERM
LOCATION DETAILED: LEFT INFERIOR MEDIAL LOWER BACK
LOCATION DETAILED: SUPERIOR THORACIC SPINE

## 2025-08-06 ENCOUNTER — OFFICE VISIT (OUTPATIENT)
Facility: MEDICAL CENTER | Age: 65
End: 2025-08-06
Attending: INTERNAL MEDICINE
Payer: COMMERCIAL

## 2025-08-06 VITALS
HEIGHT: 65 IN | SYSTOLIC BLOOD PRESSURE: 104 MMHG | HEART RATE: 58 BPM | DIASTOLIC BLOOD PRESSURE: 62 MMHG | OXYGEN SATURATION: 98 % | BODY MASS INDEX: 24.32 KG/M2 | WEIGHT: 146 LBS | RESPIRATION RATE: 16 BRPM

## 2025-08-06 DIAGNOSIS — E78.5 DYSLIPIDEMIA: ICD-10-CM

## 2025-08-06 DIAGNOSIS — Z95.1 S/P CABG (CORONARY ARTERY BYPASS GRAFT): ICD-10-CM

## 2025-08-06 DIAGNOSIS — I10 PRIMARY HYPERTENSION: ICD-10-CM

## 2025-08-06 DIAGNOSIS — I24.0 ISCHEMIC HEART DISEASE DUE TO CORONARY ARTERY OBSTRUCTION (HCC): Primary | ICD-10-CM

## 2025-08-06 DIAGNOSIS — I25.5 ISCHEMIC CARDIOMYOPATHY: ICD-10-CM

## 2025-08-06 DIAGNOSIS — I50.20 HEART FAILURE WITH REDUCED EJECTION FRACTION (HCC): ICD-10-CM

## 2025-08-06 DIAGNOSIS — I25.9 ISCHEMIC HEART DISEASE DUE TO CORONARY ARTERY OBSTRUCTION (HCC): Primary | ICD-10-CM

## 2025-08-06 PROCEDURE — 99213 OFFICE O/P EST LOW 20 MIN: CPT | Performed by: INTERNAL MEDICINE

## 2025-08-06 PROCEDURE — 3074F SYST BP LT 130 MM HG: CPT | Performed by: INTERNAL MEDICINE

## 2025-08-06 PROCEDURE — 3078F DIAST BP <80 MM HG: CPT | Performed by: INTERNAL MEDICINE

## 2025-08-06 PROCEDURE — 99214 OFFICE O/P EST MOD 30 MIN: CPT | Performed by: INTERNAL MEDICINE

## 2025-08-06 RX ORDER — CLOPIDOGREL BISULFATE 75 MG/1
75 TABLET ORAL DAILY
Qty: 100 TABLET | Refills: 3 | Status: SHIPPED | OUTPATIENT
Start: 2025-08-06

## 2025-08-06 ASSESSMENT — FIBROSIS 4 INDEX: FIB4 SCORE: 0.45

## 2025-08-26 RX ORDER — CLINDAMYCIN PHOSPHATE 10 MG/ML
LOTION TOPICAL
Qty: 60 | Refills: 5 | Status: ERX

## (undated) DEVICE — SPONGE XRAY 8X4 STERL. 12PL - (10EA/TY 80TY/CA)

## (undated) DEVICE — DECANTER FLD BLS - (50/CA)

## (undated) DEVICE — SYSTEM CHEST DRAIN ADULT/PEDS W/AUTO TRANSFUSION CAPABILITY SAHARA (6EA/CA)

## (undated) DEVICE — KIT INTROPERCUTANEOUS SHEATH - 8.5 FR W/MAX BARRIER AND BIOPATCH (5/CA)

## (undated) DEVICE — SUTURE 5 SURGICAL STEEL V-40 - (12/BX) CCS CURRENT

## (undated) DEVICE — SYRINGE NON SAFETY 3 CC 21 GA X 1 1/2 IN (100/BX 8BX/CA)

## (undated) DEVICE — SUTURE 2-0 ETHIBOND SH D/A 36 (36PK/BX)"

## (undated) DEVICE — SUTURE 4-0 30CM STRATAFIX SPIRAL PS-2 (12EA/BX)

## (undated) DEVICE — INSERT STEALTH #5 - (10/BX)

## (undated) DEVICE — GLOVE BIOGEL PI ORTHO SZ 7.5 PF LF (40PR/BX)

## (undated) DEVICE — SUTURE 7-0 PROLENE 24BV175-6 - EP8735H (36/BX)"

## (undated) DEVICE — GOWN SURGICAL XX-LARGE - (28EA/CA) SIRUS NON REINFORCED

## (undated) DEVICE — DERMABOND ADVANCED - (12EA/BX)

## (undated) DEVICE — SUTURE 3-0 SILK SH C/R 18 IN - (12/BX)

## (undated) DEVICE — D-5-W INJ. 500 ML - (24EA/CA)

## (undated) DEVICE — TRANSDUCER BIFURCATED MONITORING KIT (10EA/CA)

## (undated) DEVICE — TUBING PRSS MNTR 72IN M/ M LL - (25/BX) MONIT. LINE W/MALE L/L

## (undated) DEVICE — STAPLER SKIN DISP - (6/BX 10BX/CA) VISISTAT

## (undated) DEVICE — TUBE CHEST 32FR. RIGHT ANGLED (10EA/CA)

## (undated) DEVICE — SENSOR CEREBRAL AND SOMATIC MONITORING (20/CA)

## (undated) DEVICE — GOWN SURGEONS X-LARGE - DISP. (30/CA)

## (undated) DEVICE — BLOWER/MISTER (5EA/PK)

## (undated) DEVICE — PACK VEIN - (19/CA)

## (undated) DEVICE — SOD. CHL. INJ. 0.9% 1000 ML - (14EA/CA 60CA/PF)

## (undated) DEVICE — PTFE PLED STER - (250/CA)

## (undated) DEVICE — TUBING INSUFFLATION - (10/BX)

## (undated) DEVICE — SET EXTENSION WITH 2 PORTS (48EA/CA) ***PART #2C8610 IS A SUBSTITUTE*****

## (undated) DEVICE — CHLORAPREP 26 ML APPLICATOR - ORANGE TINT(25/CA)

## (undated) DEVICE — SLEEVE, VASO, THIGH, MED

## (undated) DEVICE — FIBRILLAR SURGICEL 4X4 - 10/CA

## (undated) DEVICE — SET CATHETER CENTRAL VENOUS 5X15 ORDER BY THE EACH

## (undated) DEVICE — KIT ENDOHARVEST SYSTEM 8 - MUST ORDER 5 AT A TIME

## (undated) DEVICE — PACK CV DRAPING/BASIN 2PART - (1/CA)

## (undated) DEVICE — SYS DLV COST CLS RM TEMP - INJECTATE (CO-SET II) (10EA/CA)

## (undated) DEVICE — GLOVE SIZE 6.5 SURGEON ACCELERATOR FREE GREEN (50PR/BX)

## (undated) DEVICE — TUBING CLEARLINK DUO-VENT - C-FLO (48EA/CA)

## (undated) DEVICE — CANISTER SUCTION 3000ML MECHANICAL FILTER AUTO SHUTOFF MEDI-VAC NONSTERILE LF DISP (40EA/CA)

## (undated) DEVICE — BLADE SURGICAL #15 - (50/BX 3BX/CA)

## (undated) DEVICE — LEAD PACING TEMP MYO - (12/BX)

## (undated) DEVICE — TOWELS CLOTH SURGICAL - (4/PK 20PK/CA)

## (undated) DEVICE — PUNCH 4MM SHRP CNCL TIP DL - (6/BX)

## (undated) DEVICE — KIT TOURNIQUET DLP (40EA/PK)

## (undated) DEVICE — CATHETER THERMALDILUTION SWAN - (5EA/CA)

## (undated) DEVICE — SUCTION INSTRUMENT YANKAUER BULBOUS TIP W/O VENT (50EA/CA)

## (undated) DEVICE — SUTURE OHS

## (undated) DEVICE — BLADE BEAVER 6900 MINI SHARP ALL AROUND (20/CA)

## (undated) DEVICE — PAD LAP STERILE 18 X 18 - (5/PK 40PK/CA)

## (undated) DEVICE — BLADE STERNUM SAW SURGICAL 32.0 X 6.4 MM STERILE (1/EA)

## (undated) DEVICE — COVER LIGHT HANDLE ALC PLUS DISP (18EA/BX)

## (undated) DEVICE — TUBE CHEST 32FR. STRAIGHT - (10EA/CA)

## (undated) DEVICE — SODIUM CHL IRRIGATION 0.9% 1000ML (12EA/CA)

## (undated) DEVICE — KIT CATHETERIZATION TWO-LUMEN CENTRAL VENOUS PI CVC (5EA/CA)

## (undated) DEVICE — SENSOR OXIMETER ADULT SPO2 RD SET (20EA/BX)

## (undated) DEVICE — GLOVE BIOGEL PI ORTHO SZ 8.5 PF LF (40/BX)

## (undated) DEVICE — SET FLUID WARMING STANDARD FLOW - (10/CA)

## (undated) DEVICE — SPRING BULLDOG 1/2 FORCE BLUE - (10/BX)

## (undated) DEVICE — SODIUM CHL. INJ. 0.9% 500ML (24EA/CA 50CA/PF)

## (undated) DEVICE — MICRODRIP PRIMARY VENTED 60 (48EA/CA) THIS WAS PART #2C8428 WHICH WAS DISCONTINUED

## (undated) DEVICE — SET TUBING PNEUMOCLEAR HIGH FLOW SMOKE EVACUATION (10EA/BX)

## (undated) DEVICE — SET LEADWIRE 5 LEAD BEDSIDE DISPOSABLE ECG (1SET OF 5/EA)

## (undated) DEVICE — ELECTRODE DUAL RETURN W/ CORD - (50/PK)

## (undated) DEVICE — SUTURE 4-0 PROLENE RB-1 D/A 36 (36PK/BX)"

## (undated) DEVICE — BAG RESUSCITATION DISPOSABLE - WITH MASK (10 EA/CA)

## (undated) DEVICE — TRAY SURESTEP FOLEY TEMP SENSING 16FR SNAP SECURE(10EA/CA) ORDER #18764 FOR TEMP FOLEY ONLY

## (undated) DEVICE — STOPCOCK MALE 4-WAY - (50/CA)

## (undated) DEVICE — KIT RADIAL ARTERY 20GA W/MAX BARRIER AND BIOPATCH (5EA/CA) #10740 IS FOR THE SET RADIAL ARTERIAL

## (undated) DEVICE — LACTATED RINGERS INJ 1000 ML - (14EA/CA 60CA/PF)

## (undated) DEVICE — SUTURE 6-0 PROLENE RB-2 D/A 30 (36PK/BX)"

## (undated) DEVICE — SUTURE 3-0 PROLENE SH 36 (36PK/BX)"

## (undated) DEVICE — SET BIFURCATED BLOOD - (48EA/CS)

## (undated) DEVICE — CORETEMP DRAPE FORM-FITTED EASY DROPANDGO DRAPE FOR USE ON THE CORETEMP FLUID MANAGEMENT 56IN X 56IN

## (undated) DEVICE — SUTURE 2-0 VICRYL PLUS CT-1 36 (36PK/BX)"